# Patient Record
Sex: MALE | Race: BLACK OR AFRICAN AMERICAN | Employment: FULL TIME | URBAN - NONMETROPOLITAN AREA
[De-identification: names, ages, dates, MRNs, and addresses within clinical notes are randomized per-mention and may not be internally consistent; named-entity substitution may affect disease eponyms.]

---

## 2021-10-07 ENCOUNTER — HOSPITAL ENCOUNTER (OUTPATIENT)
Age: 41
Setting detail: OBSERVATION
Discharge: HOME | End: 2021-10-08
Attending: EMERGENCY MEDICINE | Admitting: HOSPITALIST
Payer: MEDICAID

## 2021-10-07 ENCOUNTER — APPOINTMENT (OUTPATIENT)
Dept: CT IMAGING | Age: 41
End: 2021-10-07
Attending: EMERGENCY MEDICINE
Payer: MEDICAID

## 2021-10-07 ENCOUNTER — APPOINTMENT (OUTPATIENT)
Dept: GENERAL RADIOLOGY | Age: 41
End: 2021-10-07
Attending: EMERGENCY MEDICINE
Payer: MEDICAID

## 2021-10-07 DIAGNOSIS — U07.1 PNEUMONIA DUE TO COVID-19 VIRUS: Primary | ICD-10-CM

## 2021-10-07 DIAGNOSIS — J12.82 PNEUMONIA DUE TO COVID-19 VIRUS: Primary | ICD-10-CM

## 2021-10-07 PROBLEM — R79.89 POSITIVE D DIMER: Status: ACTIVE | Noted: 2021-10-07

## 2021-10-07 PROBLEM — R09.02 HYPOXIA: Status: ACTIVE | Noted: 2021-10-07

## 2021-10-07 LAB
ALBUMIN SERPL-MCNC: 3.7 G/DL (ref 3.5–5)
ALBUMIN/GLOB SERPL: 0.8 {RATIO} (ref 1.1–2.2)
ALP SERPL-CCNC: 137 U/L (ref 45–117)
ALT SERPL-CCNC: 45 U/L (ref 12–78)
ANION GAP SERPL CALC-SCNC: 12 MMOL/L (ref 5–15)
ARTERIAL PATENCY WRIST A: NORMAL
AST SERPL W P-5'-P-CCNC: 59 U/L (ref 15–37)
BASE EXCESS BLDA CALC-SCNC: 0.7 MMOL/L (ref 0–2)
BASOPHILS # BLD: 0.1 K/UL (ref 0–0.2)
BASOPHILS NFR BLD: 2 % (ref 0–2.5)
BDY SITE: NORMAL
BILIRUB SERPL-MCNC: 0.3 MG/DL (ref 0.2–1)
BUN SERPL-MCNC: 6 MG/DL (ref 6–20)
BUN/CREAT SERPL: 7 (ref 12–20)
CA-I BLD-MCNC: 8.3 MG/DL (ref 8.5–10.1)
CHLORIDE SERPL-SCNC: 101 MMOL/L (ref 97–108)
CO2 SERPL-SCNC: 30 MMOL/L (ref 21–32)
COHGB MFR BLD: 0.8 % (ref 0–5)
COVID-19 RAPID TEST, COVR: DETECTED
CREAT SERPL-MCNC: 0.83 MG/DL (ref 0.7–1.3)
D DIMER PPP FEU-MCNC: 1 MG/L FEU (ref 0.19–0.5)
EOSINOPHIL # BLD: 0 K/UL (ref 0–0.7)
EOSINOPHIL NFR BLD: 0 % (ref 0.9–2.9)
ERYTHROCYTE [DISTWIDTH] IN BLOOD BY AUTOMATED COUNT: 18.5 % (ref 11.5–14.5)
FIO2 ON VENT: 24 %
GAS FLOW.O2 O2 DELIVERY SYS: 2 L/MIN
GLOBULIN SER CALC-MCNC: 4.4 G/DL (ref 2–4)
GLUCOSE SERPL-MCNC: 89 MG/DL (ref 65–100)
HCO3 BLDA-SCNC: 25 MMOL/L (ref 22–26)
HCT VFR BLD AUTO: 49.3 % (ref 41–53)
HGB BLD OXIMETRY-MCNC: 17 G/DL (ref 13.5–17.5)
HGB BLD-MCNC: 16.9 G/DL (ref 13.5–17.5)
LACTATE SERPL-SCNC: 1.9 MMOL/L (ref 0.4–2)
LACTATE SERPL-SCNC: 2.5 MMOL/L (ref 0.4–2)
LDH SERPL L TO P-CCNC: 346 U/L (ref 85–241)
LYMPHOCYTES # BLD: 1 K/UL (ref 1–4.8)
LYMPHOCYTES NFR BLD: 21 % (ref 20.5–51.1)
MAGNESIUM SERPL-MCNC: 1.7 MG/DL (ref 1.6–2.4)
MCH RBC QN AUTO: 32.7 PG (ref 31–34)
MCHC RBC AUTO-ENTMCNC: 34.2 G/DL (ref 31–36)
MCV RBC AUTO: 95.6 FL (ref 80–100)
METHGB MFR BLD: 0.1 % (ref 0–5)
MONOCYTES # BLD: 0.5 K/UL (ref 0.2–2.4)
MONOCYTES NFR BLD: 11 % (ref 1.7–9.3)
NEUTS SEG # BLD: 3.4 K/UL (ref 1.8–7.7)
NEUTS SEG NFR BLD: 66 % (ref 42–75)
NRBC # BLD: 0.01 K/UL
NRBC BLD-RTO: 0.2 PER 100 WBC
OXYHGB MFR BLD: 96.3 % (ref 95–100)
PCO2 BLDA: 41 MMHG (ref 35–45)
PH BLDA: 7.41 [PH] (ref 7.35–7.45)
PLATELET # BLD AUTO: 178 K/UL (ref 150–400)
PMV BLD AUTO: 7 FL (ref 6.5–11.5)
PO2 BLDA: 92 MMHG (ref 70–100)
POTASSIUM SERPL-SCNC: 3.5 MMOL/L (ref 3.5–5.1)
PROCALCITONIN SERPL-MCNC: 0.06 NG/ML
PROT SERPL-MCNC: 8.1 G/DL (ref 6.4–8.2)
RBC # BLD AUTO: 5.15 M/UL (ref 4.5–5.9)
SAO2% DEVICE SAO2% SENSOR NAME: NORMAL
SODIUM SERPL-SCNC: 143 MMOL/L (ref 136–145)
SPECIMEN SITE: NORMAL
SPECIMEN SOURCE: ABNORMAL
TROPONIN-HIGH SENSITIVITY: 7 NG/L (ref 0–76)
WBC # BLD AUTO: 5.1 K/UL (ref 4.4–11.3)

## 2021-10-07 PROCEDURE — 96376 TX/PRO/DX INJ SAME DRUG ADON: CPT

## 2021-10-07 PROCEDURE — 84484 ASSAY OF TROPONIN QUANT: CPT

## 2021-10-07 PROCEDURE — 85025 COMPLETE CBC W/AUTO DIFF WBC: CPT

## 2021-10-07 PROCEDURE — 83605 ASSAY OF LACTIC ACID: CPT

## 2021-10-07 PROCEDURE — 85379 FIBRIN DEGRADATION QUANT: CPT

## 2021-10-07 PROCEDURE — 99218 HC RM OBSERVATION: CPT

## 2021-10-07 PROCEDURE — 77010033678 HC OXYGEN DAILY

## 2021-10-07 PROCEDURE — 86140 C-REACTIVE PROTEIN: CPT

## 2021-10-07 PROCEDURE — 99285 EMERGENCY DEPT VISIT HI MDM: CPT

## 2021-10-07 PROCEDURE — 65270000029 HC RM PRIVATE

## 2021-10-07 PROCEDURE — 82803 BLOOD GASES ANY COMBINATION: CPT

## 2021-10-07 PROCEDURE — 83615 LACTATE (LD) (LDH) ENZYME: CPT

## 2021-10-07 PROCEDURE — 80053 COMPREHEN METABOLIC PANEL: CPT

## 2021-10-07 PROCEDURE — 36415 COLL VENOUS BLD VENIPUNCTURE: CPT

## 2021-10-07 PROCEDURE — 82728 ASSAY OF FERRITIN: CPT

## 2021-10-07 PROCEDURE — 94760 N-INVAS EAR/PLS OXIMETRY 1: CPT

## 2021-10-07 PROCEDURE — 74011250637 HC RX REV CODE- 250/637: Performed by: HOSPITALIST

## 2021-10-07 PROCEDURE — 36600 WITHDRAWAL OF ARTERIAL BLOOD: CPT

## 2021-10-07 PROCEDURE — 71045 X-RAY EXAM CHEST 1 VIEW: CPT

## 2021-10-07 PROCEDURE — 96375 TX/PRO/DX INJ NEW DRUG ADDON: CPT

## 2021-10-07 PROCEDURE — 83735 ASSAY OF MAGNESIUM: CPT

## 2021-10-07 PROCEDURE — 96374 THER/PROPH/DIAG INJ IV PUSH: CPT

## 2021-10-07 PROCEDURE — 87635 SARS-COV-2 COVID-19 AMP PRB: CPT

## 2021-10-07 PROCEDURE — 74011000636 HC RX REV CODE- 636: Performed by: EMERGENCY MEDICINE

## 2021-10-07 PROCEDURE — 84145 PROCALCITONIN (PCT): CPT

## 2021-10-07 PROCEDURE — 71275 CT ANGIOGRAPHY CHEST: CPT

## 2021-10-07 PROCEDURE — 94640 AIRWAY INHALATION TREATMENT: CPT

## 2021-10-07 PROCEDURE — 93005 ELECTROCARDIOGRAM TRACING: CPT

## 2021-10-07 PROCEDURE — 74011250636 HC RX REV CODE- 250/636: Performed by: HOSPITALIST

## 2021-10-07 RX ORDER — POLYETHYLENE GLYCOL 3350 17 G/17G
17 POWDER, FOR SOLUTION ORAL DAILY PRN
Status: DISCONTINUED | OUTPATIENT
Start: 2021-10-07 | End: 2021-10-08 | Stop reason: HOSPADM

## 2021-10-07 RX ORDER — ONDANSETRON 2 MG/ML
4 INJECTION INTRAMUSCULAR; INTRAVENOUS
Status: DISCONTINUED | OUTPATIENT
Start: 2021-10-07 | End: 2021-10-08 | Stop reason: HOSPADM

## 2021-10-07 RX ORDER — SODIUM CHLORIDE 0.9 % (FLUSH) 0.9 %
5-40 SYRINGE (ML) INJECTION EVERY 8 HOURS
Status: DISCONTINUED | OUTPATIENT
Start: 2021-10-07 | End: 2021-10-08 | Stop reason: HOSPADM

## 2021-10-07 RX ORDER — FAMOTIDINE 20 MG/1
20 TABLET, FILM COATED ORAL 2 TIMES DAILY
Status: DISCONTINUED | OUTPATIENT
Start: 2021-10-07 | End: 2021-10-08 | Stop reason: HOSPADM

## 2021-10-07 RX ORDER — CALCIUM CARB/MAGNESIUM CARB 311-232MG
10 TABLET ORAL
Status: DISCONTINUED | OUTPATIENT
Start: 2021-10-07 | End: 2021-10-08 | Stop reason: HOSPADM

## 2021-10-07 RX ORDER — MAGNESIUM SULFATE HEPTAHYDRATE 40 MG/ML
2 INJECTION, SOLUTION INTRAVENOUS ONCE
Status: COMPLETED | OUTPATIENT
Start: 2021-10-07 | End: 2021-10-07

## 2021-10-07 RX ORDER — ACETAMINOPHEN 500 MG
2000 TABLET ORAL DAILY
Status: DISCONTINUED | OUTPATIENT
Start: 2021-10-07 | End: 2021-10-08 | Stop reason: HOSPADM

## 2021-10-07 RX ORDER — SODIUM CHLORIDE 0.9 % (FLUSH) 0.9 %
5-40 SYRINGE (ML) INJECTION AS NEEDED
Status: DISCONTINUED | OUTPATIENT
Start: 2021-10-07 | End: 2021-10-08 | Stop reason: HOSPADM

## 2021-10-07 RX ORDER — AZITHROMYCIN 250 MG/1
500 TABLET, FILM COATED ORAL DAILY
Status: DISCONTINUED | OUTPATIENT
Start: 2021-10-07 | End: 2021-10-08 | Stop reason: HOSPADM

## 2021-10-07 RX ORDER — MONTELUKAST SODIUM 10 MG/1
10 TABLET ORAL
Status: DISCONTINUED | OUTPATIENT
Start: 2021-10-07 | End: 2021-10-08 | Stop reason: HOSPADM

## 2021-10-07 RX ORDER — ALBUTEROL SULFATE 90 UG/1
2 AEROSOL, METERED RESPIRATORY (INHALATION)
Status: DISCONTINUED | OUTPATIENT
Start: 2021-10-07 | End: 2021-10-08 | Stop reason: HOSPADM

## 2021-10-07 RX ORDER — IBUPROFEN 200 MG
1 TABLET ORAL DAILY
Status: DISCONTINUED | OUTPATIENT
Start: 2021-10-07 | End: 2021-10-08 | Stop reason: HOSPADM

## 2021-10-07 RX ORDER — ENOXAPARIN SODIUM 100 MG/ML
40 INJECTION SUBCUTANEOUS DAILY
Status: DISCONTINUED | OUTPATIENT
Start: 2021-10-08 | End: 2021-10-08 | Stop reason: HOSPADM

## 2021-10-07 RX ORDER — ACETAMINOPHEN 325 MG/1
650 TABLET ORAL
Status: DISCONTINUED | OUTPATIENT
Start: 2021-10-07 | End: 2021-10-08 | Stop reason: HOSPADM

## 2021-10-07 RX ORDER — DEXAMETHASONE SODIUM PHOSPHATE 4 MG/ML
6 INJECTION, SOLUTION INTRA-ARTICULAR; INTRALESIONAL; INTRAMUSCULAR; INTRAVENOUS; SOFT TISSUE EVERY 12 HOURS
Status: DISCONTINUED | OUTPATIENT
Start: 2021-10-07 | End: 2021-10-07

## 2021-10-07 RX ORDER — DEXAMETHASONE SODIUM PHOSPHATE 100 MG/10ML
6 INJECTION INTRAMUSCULAR; INTRAVENOUS EVERY 12 HOURS
Status: DISCONTINUED | OUTPATIENT
Start: 2021-10-07 | End: 2021-10-08 | Stop reason: HOSPADM

## 2021-10-07 RX ORDER — CETIRIZINE HCL 10 MG
10 TABLET ORAL DAILY
Status: DISCONTINUED | OUTPATIENT
Start: 2021-10-07 | End: 2021-10-08 | Stop reason: HOSPADM

## 2021-10-07 RX ORDER — ACETAMINOPHEN 650 MG/1
650 SUPPOSITORY RECTAL
Status: DISCONTINUED | OUTPATIENT
Start: 2021-10-07 | End: 2021-10-08 | Stop reason: HOSPADM

## 2021-10-07 RX ORDER — ASCORBIC ACID 500 MG
500 TABLET ORAL 2 TIMES DAILY
Status: DISCONTINUED | OUTPATIENT
Start: 2021-10-07 | End: 2021-10-08 | Stop reason: HOSPADM

## 2021-10-07 RX ORDER — ZINC SULFATE 50(220)MG
1 CAPSULE ORAL DAILY
Status: DISCONTINUED | OUTPATIENT
Start: 2021-10-07 | End: 2021-10-08 | Stop reason: HOSPADM

## 2021-10-07 RX ORDER — MELATONIN
2000 DAILY
Status: DISCONTINUED | OUTPATIENT
Start: 2021-10-07 | End: 2021-10-07

## 2021-10-07 RX ADMIN — AZITHROMYCIN 500 MG: 250 TABLET, FILM COATED ORAL at 14:33

## 2021-10-07 RX ADMIN — FAMOTIDINE 20 MG: 20 TABLET, FILM COATED ORAL at 22:04

## 2021-10-07 RX ADMIN — MAGNESIUM SULFATE HEPTAHYDRATE 2 G: 40 INJECTION, SOLUTION INTRAVENOUS at 17:15

## 2021-10-07 RX ADMIN — Medication 2000 UNITS: at 14:33

## 2021-10-07 RX ADMIN — CETIRIZINE HYDROCHLORIDE 10 MG: 10 TABLET, FILM COATED ORAL at 14:33

## 2021-10-07 RX ADMIN — BARICITINIB 4 MG: 2 TABLET, FILM COATED ORAL at 14:33

## 2021-10-07 RX ADMIN — ZINC SULFATE 220 MG (50 MG) CAPSULE 1 CAPSULE: CAPSULE at 14:33

## 2021-10-07 RX ADMIN — DEXAMETHASONE SODIUM PHOSPHATE 6 MG: 10 INJECTION INTRAMUSCULAR; INTRAVENOUS at 22:03

## 2021-10-07 RX ADMIN — MOMETASONE FUROATE AND FORMOTEROL FUMARATE DIHYDRATE 2 PUFF: 200; 5 AEROSOL RESPIRATORY (INHALATION) at 21:02

## 2021-10-07 RX ADMIN — Medication 10 ML: at 22:04

## 2021-10-07 RX ADMIN — Medication 10 MG: at 22:04

## 2021-10-07 RX ADMIN — MONTELUKAST 10 MG: 10 TABLET, FILM COATED ORAL at 22:04

## 2021-10-07 RX ADMIN — OXYCODONE HYDROCHLORIDE AND ACETAMINOPHEN 500 MG: 500 TABLET ORAL at 14:33

## 2021-10-07 RX ADMIN — DEXAMETHASONE SODIUM PHOSPHATE 6 MG: 10 INJECTION INTRAMUSCULAR; INTRAVENOUS at 14:33

## 2021-10-07 RX ADMIN — Medication 10 ML: at 14:53

## 2021-10-07 RX ADMIN — OXYCODONE HYDROCHLORIDE AND ACETAMINOPHEN 500 MG: 500 TABLET ORAL at 22:03

## 2021-10-07 RX ADMIN — IOPAMIDOL 100 ML: 755 INJECTION, SOLUTION INTRAVENOUS at 08:25

## 2021-10-07 NOTE — ED PROVIDER NOTES
EMERGENCY DEPARTMENT HISTORY AND PHYSICAL EXAM  ?    Date: 10/7/2021  Patient Name: Ashwin Oliva. History of Presenting Illness    Patient presents with:  Dizziness  Fatigue      History Provided By: Patient    HPI: Ashwin Oliva., 42 y.o. male with no significant past medical history presents to the ED with cc of dizziness that started yesterday. He went to work and was sent home. He says he got diaphoretic. He denies vertigo. In this morning he had another episode of dizziness, feeling near syncopal.  He denies chest pain or shortness of breath. On room air, he has O2 saturation of 91%. He is a smoker and has occasional cough. He denies fever or congestion. No vomiting or diarrhea. Family history is negative for coronary artery disease. He denies drug use. There are no other complaints, changes, or physical findings at this time. PCP: None        Past History    Past Medical History:  History reviewed. No pertinent past medical history. Past Surgical History:  History reviewed. No pertinent surgical history. Family History:  History reviewed. No pertinent family history.       Social History:  Social History   Tobacco Use     Smoking status: Current Every Day Smoker       Packs/day: 0.25     Smokeless tobacco: Never Used   Vaping Use     Vaping Use: Never used   Alcohol use: Yes     Comment: occasionally    Drug use: Never      Allergies:  No Known Allergies      Review of Systems  @[facility]@    Physical Exam  [unfilled]    Diagnostic Study Results    Labs -  Recent Results (from the past 12 hour(s))  -EKG, 12 LEAD, INITIAL  Collection Time: 10/07/21  6:08 AM      Result                      Value             Ref Range          Ventricular Rate            84                BPM                Atrial Rate                 84                BPM                P-R Interval                143               ms                 QRS Duration                74                ms Q-T Interval                361               ms                 QTC Calculation (Bezet)     427               ms                 Calculated P Axis           83                degrees            Calculated R Axis           60                degrees            Calculated T Axis           69                degrees            Diagnosis                                                    Sinus rhythm Right atrial enlargement Consider left ventricular hypertrophy ST elev, probable normal early repol pattern Baseline wander in lead(s) V1     Radiologic Studies -   XR CHEST PORT    (Results Pending)  CT Results  (Last 48 hours)   None     CXR Results  (Last 48 hours)   None       Medical Decision Making and ED Course  I am the first provider for this patient. I reviewed the vital signs, available nursing notes, past medical history, past surgical history, family history and social history. Vital Signs-Reviewed the patient's vital signs. Empty flowsheet group. EKG interpretation: (Preliminary)  Rhythm: normal sinus rhythm; and regular . Rate (approx.): 84; Axis: normal; GA interval: normal; QRS interval: normal ; ST/T wave: non-specific changes; Other findings: Early repolarization. Records Reviewed: Nursing Notes    Provider Notes (Medical Decision Making):   Rule out ACS. The patient presents with differential diagnosis of Covid infection, COPD, asthma, ACS      ED Course:   Initial assessment performed. The patients presenting problems have been discussed, and they are in agreement with the care plan formulated and outlined with them. I have encouraged them to ask questions as they arise throughout their visit. Procedures      MD Giles Rosario MD        Disposition            DISCHARGE PLAN:  1. There are no discharge medications for this patient. 2. Follow-up Information    None    3.   Return to ED if worse     Diagnosis    Clinical Impression:       Giles Awad MD    Please note that this dictation was completed with Babble, the computer voice recognition software. Quite often unanticipated grammatical, syntax, homophones, and other interpretive errors are inadvertently transcribed by the computer software. Please disregard these errors. Please excuse any errors that have escaped final proofreading. Thank you. History reviewed. No pertinent past medical history. History reviewed. No pertinent surgical history. History reviewed. No pertinent family history. Social History     Socioeconomic History    Marital status:      Spouse name: Not on file    Number of children: Not on file    Years of education: Not on file    Highest education level: Not on file   Occupational History    Not on file   Tobacco Use    Smoking status: Current Every Day Smoker     Packs/day: 0.25    Smokeless tobacco: Never Used   Vaping Use    Vaping Use: Never used   Substance and Sexual Activity    Alcohol use: Yes     Comment: occasionally     Drug use: Never    Sexual activity: Yes     Partners: Female     Birth control/protection: Condom   Other Topics Concern    Not on file   Social History Narrative    Not on file     Social Determinants of Health     Financial Resource Strain:     Difficulty of Paying Living Expenses:    Food Insecurity:     Worried About Running Out of Food in the Last Year:     [address] in the Last Year:    Transportation Needs:     Lack of Transportation (Medical):      Lack of Transportation (Non-Medical):    Physical Activity:     Days of Exercise per Week:     Minutes of Exercise per Session:    Stress:     Feeling of Stress :    Social Connections:     Frequency of Communication with Friends and Family:     Frequency of Social Gatherings with Friends and Family:     Attends [employer] Services:     Active Member of Clubs or Organizations:     Attends Club or Organization Meetings:     Marital Status: Intimate Partner Violence:     Fear of Current or Ex-Partner:     Emotionally Abused:     Physically Abused:     Sexually Abused: ALLERGIES: Patient has no known allergies. Review of Systems   Constitutional: Negative. HENT: Negative. Eyes: Negative. Respiratory: Negative. Cardiovascular: Negative. Gastrointestinal: Negative. Endocrine: Negative. Genitourinary: Negative. Musculoskeletal: Negative. Skin: Negative. Allergic/Immunologic: Negative. Neurological: Positive for dizziness and light-headedness. Negative for seizures. Hematological: Negative. Psychiatric/Behavioral: Negative. Vitals:    10/07/21 0601   BP: (!) 140/100   Pulse: 84   Resp: 16   Temp: 98.3 °F (36.8 °C)   SpO2: 93%   Weight: 66.7 kg (147 lb)   Height: 5' 10\" (1.778 m)            Physical Exam  Vitals and nursing note reviewed. Constitutional:       Appearance: Normal appearance. He is normal weight. HENT:      Head: Normocephalic and atraumatic. Right Ear: Tympanic membrane, ear canal and external ear normal.      Left Ear: Tympanic membrane, ear canal and external ear normal.      Nose: Nose normal.      Mouth/Throat:      Mouth: Mucous membranes are moist.      Pharynx: Oropharynx is clear. Eyes:      Extraocular Movements: Extraocular movements intact. Conjunctiva/sclera: Conjunctivae normal.      Pupils: Pupils are equal, round, and reactive to light. Cardiovascular:      Rate and Rhythm: Normal rate and regular rhythm. Pulses: Normal pulses. Heart sounds: Normal heart sounds. Pulmonary:      Effort: Pulmonary effort is normal.      Breath sounds: Normal breath sounds. Abdominal:      General: Abdomen is flat. Bowel sounds are normal.      Palpations: Abdomen is soft. Musculoskeletal:         General: Normal range of motion. Cervical back: Normal range of motion and neck supple. Skin:     General: Skin is warm and dry.    Neurological: General: No focal deficit present. Mental Status: He is alert and oriented to person, place, and time.    Psychiatric:         Mood and Affect: Mood normal.         Behavior: Behavior normal.          MDM       Procedures

## 2021-10-07 NOTE — ED NOTES
TRANSFER - OUT REPORT:    Verbal report given to Acacia Cutler.  being transferred to [facility] for routine progression of care       Report consisted of patients Situation, Background, Assessment and   Recommendations(SBAR). Information from the following report(s) SBAR was reviewed with the receiving nurse. Opportunity for questions and clarification was provided.       Patient transported with:   Registered Nurse

## 2021-10-07 NOTE — ED TRIAGE NOTES
Patient states he has feelings of dizziness and weakness which began 2 days ago. States he got to work this morning and felt sweaty and \"like I was gonna pass out. \"

## 2021-10-07 NOTE — H&P
History and Physical      Chief Complaints:     Chief Complaint   Patient presents with    Dizziness    Fatigue         Subjective:     Tk Hanna. is a 42 y.o. male has no past medical history presents with complaint of dizziness. States that has been having symptoms of dizziness especially in the morning upon waking up and soon after getting cold sweats. Is been having increasing cough but denies any shortness of breath or fevers his wife and kids all doing well and for the past 2 days been having no work worsening of symptoms  On evaluation patient had slight elevated D-dimer of 1 and CT of the chest was done which found to be negative. Initial O2 sat was 91% on room air and ABG on 2 L showed a PO2 of 92. Initial Covid swab was positive CT of the chest showed no evidence of pulmonary embolism indeterminate faint groundglass opacities in the right lower lobe and mild central bronchial wall thickening patient presentation he was referred for admission for COVID-19 positive pneumonia along with hypoxia      History reviewed. No pertinent past medical history. History reviewed. No pertinent surgical history. History reviewed. No pertinent family history. Social History     Tobacco Use    Smoking status: Current Every Day Smoker     Packs/day: 0.25    Smokeless tobacco: Never Used   Substance Use Topics    Alcohol use: Yes     Comment: occasionally        Prior to Admission medications    Not on File     No Known Allergies     Review of Systems:  Review of Systems   Constitutional: Negative for chills, diaphoresis, fatigue and fever. HENT: Negative for congestion, ear pain, postnasal drip, sinus pain and sore throat. Eyes: Negative for pain, discharge and redness. Respiratory: Positive for cough. Negative for shortness of breath and wheezing. Cardiovascular: Negative for chest pain and palpitations.    Gastrointestinal: Negative for abdominal pain, constipation, diarrhea, nausea and vomiting. Genitourinary: Negative for dysuria, flank pain, frequency and urgency. Musculoskeletal: Negative for arthralgias and myalgias. Neurological: Positive for dizziness. Negative for weakness and headaches. Psychiatric/Behavioral: Negative for agitation and hallucinations. The patient is not nervous/anxious. Objective:     Vitals:  Visit Vitals  BP (!) 178/102   Pulse 83   Temp 98.6 °F (37 °C)   Resp 16   Ht 5' 10\" (1.778 m)   Wt 66.7 kg (147 lb)   SpO2 98%   BMI 21.09 kg/m²       Physical Exam:  General: Alert, cooperative, no distress. Head:  Normocephalic, without obvious abnormality, atraumatic. Eyes:  Conjunctivae/corneas clear. Pupils equal, round, reactive to light. Extraocular movements intact. Lungs:  Clear to auscultation bilaterally, no wheezes, crackles  Chest wall: No tenderness or deformity. Heart:  Regular rate and rhythm, S1, S2 normal, no murmur, click, rub, or gallop. Abdomen:   Soft, non-tender. Bowel sounds normal. No masses. No organomegaly. Back:  No spine tenderness to palpation  Extremities: Extremities normal, atraumatic, no cyanosis or edema. Pulses: Symmetric all extremities. Skin: Skin color, texture, turgor normal.   Lymph nodes: Cervical nodes normal.  Neurologic: CNII-XII intact. Normal strength, sensation, and reflexes throughout.       Labs:  Recent Results (from the past 24 hour(s))   EKG, 12 LEAD, INITIAL    Collection Time: 10/07/21  6:08 AM   Result Value Ref Range    Ventricular Rate 84 BPM    Atrial Rate 84 BPM    P-R Interval 143 ms    QRS Duration 74 ms    Q-T Interval 361 ms    QTC Calculation (Bezet) 427 ms    Calculated P Axis 83 degrees    Calculated R Axis 60 degrees    Calculated T Axis 69 degrees    Diagnosis       Sinus rhythm  Right atrial enlargement  Consider left ventricular hypertrophy  ST elev, probable normal early repol pattern  Baseline wander in lead(s) V1     CBC WITH AUTOMATED DIFF    Collection Time: 10/07/21  6:25 AM Result Value Ref Range    WBC 5.1 4.4 - 11.3 K/uL    RBC 5.15 4.50 - 5.90 M/uL    HGB 16.9 13.5 - 17.5 g/dL    HCT 49.3 41 - 53 %    MCV 95.6 80 - 100 FL    MCH 32.7 31 - 34 PG    MCHC 34.2 31.0 - 36.0 g/dL    RDW 18.5 (H) 11.5 - 14.5 %    PLATELET 754 203 - 148 K/uL    MPV 7.0 6.5 - 11.5 FL    NRBC 0.2  WBC    ABSOLUTE NRBC 0.01 K/uL    NEUTROPHILS 66 42 - 75 %    LYMPHOCYTES 21 20.5 - 51.1 %    MONOCYTES 11 (H) 1.7 - 9.3 %    EOSINOPHILS 0 (L) 0.9 - 2.9 %    BASOPHILS 2 0.0 - 2.5 %    ABS. NEUTROPHILS 3.4 1.8 - 7.7 K/UL    ABS. LYMPHOCYTES 1.0 1.0 - 4.8 K/UL    ABS. MONOCYTES 0.5 0.2 - 2.4 K/UL    ABS. EOSINOPHILS 0.0 0.0 - 0.7 K/UL    ABS. BASOPHILS 0.1 0.0 - 0.2 K/UL   METABOLIC PANEL, COMPREHENSIVE    Collection Time: 10/07/21  6:25 AM   Result Value Ref Range    Sodium 143 136 - 145 mmol/L    Potassium 3.5 3.5 - 5.1 mmol/L    Chloride 101 97 - 108 mmol/L    CO2 30 21 - 32 mmol/L    Anion gap 12 5 - 15 mmol/L    Glucose 89 65 - 100 mg/dL    BUN 6 6 - 20 mg/dL    Creatinine 0.83 0.70 - 1.30 mg/dL    BUN/Creatinine ratio 7 (L) 12 - 20      GFR est AA >60 >60 ml/min/1.73m2    GFR est non-AA >60 >60 ml/min/1.73m2    Calcium 8.3 (L) 8.5 - 10.1 mg/dL    Bilirubin, total 0.3 0.2 - 1.0 mg/dL    AST (SGOT) 59 (H) 15 - 37 U/L    ALT (SGPT) 45 12 - 78 U/L    Alk.  phosphatase 137 (H) 45 - 117 U/L    Protein, total 8.1 6.4 - 8.2 g/dL    Albumin 3.7 3.5 - 5.0 g/dL    Globulin 4.4 (H) 2.0 - 4.0 g/dL    A-G Ratio 0.8 (L) 1.1 - 2.2     TROPONIN-HIGH SENSITIVITY    Collection Time: 10/07/21  6:25 AM   Result Value Ref Range    Troponin-High Sensitivity 7 0 - 76 ng/L   D DIMER    Collection Time: 10/07/21  6:25 AM   Result Value Ref Range    D-dimer 1.00 (H) 0.19 - 0.50 mg/L FEU   LACTIC ACID    Collection Time: 10/07/21  6:25 AM   Result Value Ref Range    Lactic acid 2.5 (HH) 0.4 - 2.0 mmol/L   MAGNESIUM    Collection Time: 10/07/21  6:25 AM   Result Value Ref Range    Magnesium 1.7 1.6 - 2.4 mg/dL   LD    Collection Time: 10/07/21  6:25 AM   Result Value Ref Range     (H) 85 - 241 U/L   BLOOD GAS, ARTERIAL    Collection Time: 10/07/21  8:37 AM   Result Value Ref Range    pH 7.41 7.35 - 7.45      PCO2 41 35 - 45 mmHg    PO2 92 70 - 100 mmHg    BICARBONATE 25 22 - 26 mmol/L    BASE EXCESS 0.7 0 - 2 mmol/L    O2 METHOD Nasal Cannula      O2 FLOW RATE 2 L/min    FIO2 24.0 %    Sample source Arterial      SITE Right Radial      LUANN'S TEST PASS      Carboxy-Hgb 0.8 0 - 5 %    Methemoglobin 0.1 0 - 5 %    tHb 17.0 13.5 - 17.5 g/dL    Oxyhemoglobin 96.3 95 - 100 %   LACTIC ACID    Collection Time: 10/07/21  9:35 AM   Result Value Ref Range    Lactic acid 1.9 0.4 - 2.0 mmol/L   COVID-19 RAPID TEST    Collection Time: 10/07/21 11:00 AM   Result Value Ref Range    Specimen source Nasopharyngeal      COVID-19 rapid test DETECTED (A) Not Detected         Imaging:  CTA CHEST W OR W WO CONT    Result Date: 10/7/2021  1. No evidence of pulmonary embolism. 2.  Indeterminate faint groundglass opacities in the right lower lobe. Mild central bronchial wall thickening. 3.  See full report for detailed findings. XR CHEST PORT    Result Date: 10/7/2021  No acute findings. Assessment & Plan:     COVID-19 pneumonia  -Presents emergency room and found to have rapid Covid swab to be positive and CTA chest shows some evidence of groundglass appearance  -Droplet precautions  -Start dexamethasone 6 mg IV twice daily  -Cetirizine 10 mg daily and montelukast 10 mg at night  -Vitamin C, vitamin D, zinc   -As needed Tessalon Perles and scheduled promethazine with codeine every 8 hours  -Azithromycin 500 mg oral daily  -Melatonin nightly  -baricitinib 4mg oral daily 1st dose now   -With patient's smoking history we will also start Dulera 2 puffs twice daily  -As needed albuterol inhaler  -Follow CRP and procalcitonin levels as well as LDH and ferritin  -D-dimer initially 1 follow repeat in a.m.     Dizziness  -Most likely secondary to Covid pneumonia but does say he feels better after initial IV fluids given the emergency room  -BUN/creatinine is normal range  -Monitor on telemetry    Hypomagnesemia  - low at 1.7 and will give a 2 g IV magnesium sulfate rider  -Repeat mag level in a.m.     Hypoxia  -Initially placed on 2 L but O2 on ABG was at 92  -Taper O2 as tolerated to maintain saturation greater 92%    Tobacco use  -Smokes about a pack every 5 days since he was 11years old  -Does use an albuterol inhaler at times no official diagnosis of COPD but does have noted wheezing periodically  -CTA does show some evidence of bronchial wall thickening  -Nicotine patch change daily    GI prophylaxis  -Pepcid twice daily    DVT prophylaxis  -Lovenox    CODE STATUS: Full code  Patient's designated decision-maker is his wife Carrington Haines if for some reason he is unable to make decisions for himself    45 minutes evaluating according patient's admission to acute telemetry and expect at least 1 to 2 days of acute care stay      Electronically signed by Amado Ernandez MD on 10/7/2021 at 4:46 PM

## 2021-10-07 NOTE — ROUTINE PROCESS
Admitted to room 216. Droplet plus isolation education. oacc cough noted. No resp distress. Ate well. No pain.  On O2 per nc 2L/m

## 2021-10-08 VITALS
WEIGHT: 147 LBS | HEART RATE: 67 BPM | SYSTOLIC BLOOD PRESSURE: 145 MMHG | HEIGHT: 70 IN | DIASTOLIC BLOOD PRESSURE: 80 MMHG | TEMPERATURE: 98.5 F | RESPIRATION RATE: 20 BRPM | OXYGEN SATURATION: 97 % | BODY MASS INDEX: 21.05 KG/M2

## 2021-10-08 LAB
ALBUMIN SERPL-MCNC: 3.8 G/DL (ref 3.5–5)
ALBUMIN/GLOB SERPL: 0.9 {RATIO} (ref 1.1–2.2)
ALP SERPL-CCNC: 143 U/L (ref 45–117)
ALT SERPL-CCNC: 45 U/L (ref 12–78)
ANION GAP SERPL CALC-SCNC: 15 MMOL/L (ref 5–15)
AST SERPL W P-5'-P-CCNC: 45 U/L (ref 15–37)
ATRIAL RATE: 84 BPM
BASOPHILS # BLD: 0 K/UL (ref 0–0.2)
BASOPHILS NFR BLD: 0 % (ref 0–2.5)
BILIRUB SERPL-MCNC: 0.7 MG/DL (ref 0.2–1)
BUN SERPL-MCNC: 8 MG/DL (ref 6–20)
BUN/CREAT SERPL: 13 (ref 12–20)
CA-I BLD-MCNC: 9 MG/DL (ref 8.5–10.1)
CALCULATED P AXIS, ECG09: 83 DEGREES
CALCULATED R AXIS, ECG10: 60 DEGREES
CALCULATED T AXIS, ECG11: 69 DEGREES
CHLORIDE SERPL-SCNC: 98 MMOL/L (ref 97–108)
CO2 SERPL-SCNC: 26 MMOL/L (ref 21–32)
CREAT SERPL-MCNC: 0.64 MG/DL (ref 0.7–1.3)
CRP SERPL-MCNC: 0.42 MG/DL
D DIMER PPP FEU-MCNC: 1.42 MG/L FEU (ref 0.19–0.5)
DIAGNOSIS, 93000: NORMAL
EOSINOPHIL # BLD: 0 K/UL (ref 0–0.7)
EOSINOPHIL NFR BLD: 0 % (ref 0.9–2.9)
ERYTHROCYTE [DISTWIDTH] IN BLOOD BY AUTOMATED COUNT: 18.6 % (ref 11.5–14.5)
GLOBULIN SER CALC-MCNC: 4.1 G/DL (ref 2–4)
GLUCOSE BLD STRIP.AUTO-MCNC: 111 MG/DL (ref 65–117)
GLUCOSE BLD STRIP.AUTO-MCNC: 135 MG/DL (ref 65–117)
GLUCOSE SERPL-MCNC: 113 MG/DL (ref 65–100)
HCT VFR BLD AUTO: 49.5 % (ref 41–53)
HGB BLD-MCNC: 16.9 G/DL (ref 13.5–17.5)
LYMPHOCYTES # BLD: 0.7 K/UL (ref 1–4.8)
LYMPHOCYTES NFR BLD: 44 % (ref 20.5–51.1)
MAGNESIUM SERPL-MCNC: 2 MG/DL (ref 1.6–2.4)
MCH RBC QN AUTO: 32.9 PG (ref 31–34)
MCHC RBC AUTO-ENTMCNC: 34.1 G/DL (ref 31–36)
MCV RBC AUTO: 96.5 FL (ref 80–100)
MONOCYTES # BLD: 0.1 K/UL (ref 0.2–2.4)
MONOCYTES NFR BLD: 9 % (ref 1.7–9.3)
NEUTS SEG # BLD: 0.8 K/UL (ref 1.8–7.7)
NEUTS SEG NFR BLD: 47 % (ref 42–75)
NRBC # BLD: 0.01 K/UL
NRBC BLD-RTO: 0.5 PER 100 WBC
P-R INTERVAL, ECG05: 143 MS
PERFORMED BY, TECHID: ABNORMAL
PERFORMED BY, TECHID: NORMAL
PLATELET # BLD AUTO: 169 K/UL (ref 150–400)
PMV BLD AUTO: 7.8 FL (ref 6.5–11.5)
POTASSIUM SERPL-SCNC: 4.1 MMOL/L (ref 3.5–5.1)
PROT SERPL-MCNC: 7.9 G/DL (ref 6.4–8.2)
Q-T INTERVAL, ECG07: 361 MS
QRS DURATION, ECG06: 74 MS
QTC CALCULATION (BEZET), ECG08: 427 MS
RBC # BLD AUTO: 5.13 M/UL (ref 4.5–5.9)
SODIUM SERPL-SCNC: 139 MMOL/L (ref 136–145)
VENTRICULAR RATE, ECG03: 84 BPM
WBC # BLD AUTO: 1.7 K/UL (ref 4.4–11.3)

## 2021-10-08 PROCEDURE — 86140 C-REACTIVE PROTEIN: CPT

## 2021-10-08 PROCEDURE — 96376 TX/PRO/DX INJ SAME DRUG ADON: CPT

## 2021-10-08 PROCEDURE — 96372 THER/PROPH/DIAG INJ SC/IM: CPT

## 2021-10-08 PROCEDURE — 74011250636 HC RX REV CODE- 250/636: Performed by: HOSPITALIST

## 2021-10-08 PROCEDURE — 74011250637 HC RX REV CODE- 250/637: Performed by: HOSPITALIST

## 2021-10-08 PROCEDURE — 94760 N-INVAS EAR/PLS OXIMETRY 1: CPT

## 2021-10-08 PROCEDURE — 77010033678 HC OXYGEN DAILY

## 2021-10-08 PROCEDURE — 85025 COMPLETE CBC W/AUTO DIFF WBC: CPT

## 2021-10-08 PROCEDURE — 82962 GLUCOSE BLOOD TEST: CPT

## 2021-10-08 PROCEDURE — 80053 COMPREHEN METABOLIC PANEL: CPT

## 2021-10-08 PROCEDURE — 99218 HC RM OBSERVATION: CPT

## 2021-10-08 PROCEDURE — 87040 BLOOD CULTURE FOR BACTERIA: CPT

## 2021-10-08 PROCEDURE — 83735 ASSAY OF MAGNESIUM: CPT

## 2021-10-08 PROCEDURE — 85379 FIBRIN DEGRADATION QUANT: CPT

## 2021-10-08 PROCEDURE — 94640 AIRWAY INHALATION TREATMENT: CPT

## 2021-10-08 RX ORDER — ACETAMINOPHEN 500 MG
2000 TABLET ORAL DAILY
Qty: 30 CAPSULE | Refills: 0 | Status: SHIPPED | OUTPATIENT
Start: 2021-10-09 | End: 2022-03-31

## 2021-10-08 RX ORDER — CETIRIZINE HCL 10 MG
10 TABLET ORAL DAILY
Qty: 30 TABLET | Refills: 0 | Status: SHIPPED | OUTPATIENT
Start: 2021-10-09 | End: 2022-03-31

## 2021-10-08 RX ORDER — ASCORBIC ACID 500 MG
500 TABLET ORAL 2 TIMES DAILY
Qty: 60 TABLET | Refills: 0 | Status: SHIPPED | OUTPATIENT
Start: 2021-10-08 | End: 2022-03-31

## 2021-10-08 RX ORDER — AZITHROMYCIN 250 MG/1
500 TABLET, FILM COATED ORAL DAILY
Qty: 6 TABLET | Refills: 0 | Status: SHIPPED | OUTPATIENT
Start: 2021-10-08 | End: 2022-03-31

## 2021-10-08 RX ORDER — ALBUTEROL SULFATE 90 UG/1
2 AEROSOL, METERED RESPIRATORY (INHALATION)
Qty: 18 G | Refills: 0 | Status: SHIPPED | OUTPATIENT
Start: 2021-10-08 | End: 2022-03-31

## 2021-10-08 RX ORDER — IBUPROFEN 200 MG
1 TABLET ORAL DAILY
Qty: 30 PATCH | Refills: 0 | Status: SHIPPED | OUTPATIENT
Start: 2021-10-09 | End: 2021-11-08

## 2021-10-08 RX ORDER — ZINC SULFATE 50(220)MG
1 CAPSULE ORAL DAILY
Qty: 30 CAPSULE | Refills: 0 | Status: SHIPPED | OUTPATIENT
Start: 2021-10-09 | End: 2022-03-31

## 2021-10-08 RX ORDER — MONTELUKAST SODIUM 10 MG/1
10 TABLET ORAL
Qty: 30 TABLET | Refills: 0 | Status: SHIPPED | OUTPATIENT
Start: 2021-10-08 | End: 2022-03-31

## 2021-10-08 RX ORDER — DEXAMETHASONE 2 MG/1
TABLET ORAL
Qty: 15 TABLET | Refills: 0 | Status: SHIPPED | OUTPATIENT
Start: 2021-10-08 | End: 2021-10-18

## 2021-10-08 RX ADMIN — ZINC SULFATE 220 MG (50 MG) CAPSULE 1 CAPSULE: CAPSULE at 08:02

## 2021-10-08 RX ADMIN — OXYCODONE HYDROCHLORIDE AND ACETAMINOPHEN 500 MG: 500 TABLET ORAL at 08:02

## 2021-10-08 RX ADMIN — CETIRIZINE HYDROCHLORIDE 10 MG: 10 TABLET, FILM COATED ORAL at 08:01

## 2021-10-08 RX ADMIN — AZITHROMYCIN 500 MG: 250 TABLET, FILM COATED ORAL at 08:01

## 2021-10-08 RX ADMIN — ENOXAPARIN SODIUM 40 MG: 40 INJECTION SUBCUTANEOUS at 08:03

## 2021-10-08 RX ADMIN — DEXAMETHASONE SODIUM PHOSPHATE 6 MG: 10 INJECTION INTRAMUSCULAR; INTRAVENOUS at 09:00

## 2021-10-08 RX ADMIN — Medication 10 ML: at 06:24

## 2021-10-08 RX ADMIN — MOMETASONE FUROATE AND FORMOTEROL FUMARATE DIHYDRATE 2 PUFF: 200; 5 AEROSOL RESPIRATORY (INHALATION) at 08:11

## 2021-10-08 RX ADMIN — Medication 2000 UNITS: at 08:01

## 2021-10-08 RX ADMIN — FAMOTIDINE 20 MG: 20 TABLET, FILM COATED ORAL at 09:00

## 2021-10-08 RX ADMIN — BARICITINIB 4 MG: 2 TABLET, FILM COATED ORAL at 08:01

## 2021-10-08 NOTE — PROGRESS NOTES
Problem: Loneliness or Risk for Loneliness  Goal: Demonstrate positive use of time alone when socialization is not possible  10/8/2021 1246 by Idelia Query, LPN  Outcome: Resolved/Met  10/8/2021 0950 by Idelia Query, LPN  Outcome: Progressing Towards Goal     Problem: Fatigue  Goal: Verbalize increase energy and improved vitality  10/8/2021 1246 by Idelia Query, LPN  Outcome: Resolved/Met  10/8/2021 0950 by Emeritalia Query, LPN  Outcome: Progressing Towards Goal     Problem: Patient Education: Go to Patient Education Activity  Goal: Patient/Family Education  10/8/2021 1246 by Idelia Query, LPN  Outcome: Resolved/Met  10/8/2021 0950 by Moea Query, LPN  Outcome: Progressing Towards Goal     Problem: Falls - Risk of  Goal: *Absence of Falls  Description: Document Obey Cease Fall Risk and appropriate interventions in the flowsheet.   10/8/2021 1246 by Moea Jackie, LPN  Outcome: Resolved/Met  Note: Fall Risk Interventions:                             10/8/2021 0950 by Margy Mejia, LPN  Outcome: Progressing Towards Goal  Note: Fall Risk Interventions:                                Problem: Patient Education: Go to Patient Education Activity  Goal: Patient/Family Education  10/8/2021 1246 by Moea Query, LPN  Outcome: Resolved/Met  10/8/2021 0950 by Moea Jackie, LPN  Outcome: Progressing Towards Goal     Problem: Loneliness or Risk for Loneliness  Goal: Demonstrate positive use of time alone when socialization is not possible  10/8/2021 1246 by Idelia Query, LPN  Outcome: Resolved/Met  10/8/2021 0950 by Moea Jackie, LPN  Outcome: Progressing Towards Goal     Problem: Risk for Fluid Volume Deficit  Goal: Maintain normal heart rhythm  10/8/2021 1246 by Idelia Query, LPN  Outcome: Resolved/Met  10/8/2021 0950 by Emeritalia Query, LPN  Outcome: Progressing Towards Goal  Goal: Maintain absence of muscle cramping  10/8/2021 1246 by Idelia Query, LPN  Outcome: Resolved/Met  10/8/2021 9288 by Jason Jones LPN  Outcome: Progressing Towards Goal  Goal: Maintain normal serum potassium, sodium, calcium, phosphorus, and pH  10/8/2021 1246 by Jason Jones LPN  Outcome: Resolved/Met  10/8/2021 0950 by Jason Jones LPN  Outcome: Progressing Towards Goal     Problem: Nutrition Deficits  Goal: Optimize nutrtional status  10/8/2021 1246 by Jason Jones LPN  Outcome: Resolved/Met  10/8/2021 0950 by Jason Jones LPN  Outcome: Progressing Towards Goal     Problem: Risk for Fluid Volume Deficit  Goal: Maintain normal heart rhythm  10/8/2021 1246 by Jason Jones LPN  Outcome: Resolved/Met  10/8/2021 0950 by Jason Jones LPN  Outcome: Progressing Towards Goal  Goal: Maintain absence of muscle cramping  10/8/2021 1246 by Jason Jones LPN  Outcome: Resolved/Met  10/8/2021 0950 by Jason Jones LPN  Outcome: Progressing Towards Goal  Goal: Maintain normal serum potassium, sodium, calcium, phosphorus, and pH  10/8/2021 1246 by Jason Jones LPN  Outcome: Resolved/Met  10/8/2021 0950 by Jason Jones LPN  Outcome: Progressing Towards Goal     Problem: Nutrition Deficits  Goal: Optimize nutrtional status  10/8/2021 1246 by Jason Jones LPN  Outcome: Resolved/Met  10/8/2021 0950 by Jason Jones LPN  Outcome: Progressing Towards Goal     Problem: Isolation Precautions - Risk of Spread of Infection  Goal: Prevent transmission of infectious organism to others  10/8/2021 1249 by Jason Jones LPN  Outcome: Resolved/Met  10/8/2021 1249 by Jason Jones LPN  Outcome: Progressing Towards Goal  10/8/2021 1246 by Jason Jones LPN  Outcome: Progressing Towards Goal  10/8/2021 0950 by Jason Jones LPN  Outcome: Progressing Towards Goal     Problem:  Body Temperature -  Risk of, Imbalanced  Goal: Ability to maintain a body temperature within defined limits  10/8/2021 1246 by Jason Jones LPN  Outcome: Resolved/Met  10/8/2021 0950 by Jason Jones LPN  Outcome: Progressing Towards Goal  Goal: Will regain or maintain usual level of consciousness  10/8/2021 1246 by Huy Barrientos LPN  Outcome: Resolved/Met  10/8/2021 0950 by Huy Barrientos LPN  Outcome: Progressing Towards Goal  Goal: Complications related to the disease process, condition or treatment will be avoided or minimized  10/8/2021 1246 by Huy Barrientos LPN  Outcome: Resolved/Met  10/8/2021 0950 by Huy Barrientos LPN  Outcome: Progressing Towards Goal     Problem: Breathing Pattern - Ineffective  Goal: Ability to achieve and maintain a regular respiratory rate  10/8/2021 1246 by Huy Barrientos LPN  Outcome: Resolved/Met  10/8/2021 0950 by Huy Barrientos LPN  Outcome: Progressing Towards Goal     Problem: Breathing Pattern - Ineffective  Goal: Ability to achieve and maintain a regular respiratory rate  10/8/2021 1246 by Huy Barrientos LPN  Outcome: Resolved/Met  10/8/2021 0950 by Huy Barrientos LPN  Outcome: Progressing Towards Goal     Problem: Gas Exchange - Impaired  Goal: Absence of hypoxia  10/8/2021 1246 by Huy Barrientos LPN  Outcome: Resolved/Met  10/8/2021 0950 by Huy Barrientos LPN  Outcome: Progressing Towards Goal  Goal: Promote optimal lung function  10/8/2021 1249 by Huy Barrientos LPN  Outcome: Resolved/Met  10/8/2021 1246 by Huy Barrientos LPN  Outcome: Progressing Towards Goal  10/8/2021 0950 by Huy Barrientos LPN  Outcome: Progressing Towards Goal     Problem: Airway Clearance - Ineffective  Goal: Achieve or maintain patent airway  10/8/2021 1246 by Huy Barrientos LPN  Outcome: Resolved/Met  10/8/2021 0950 by Huy Barrientos LPN  Outcome: Progressing Towards Goal

## 2021-10-08 NOTE — ACP (ADVANCE CARE PLANNING)
Advance Care Planning   Healthcare Decision Maker:       Primary Decision Maker: Shara Murcia - Spouse - [phone]    Click here to complete [address] including selection of the Healthcare Decision Maker Relationship (ie \"Primary\")

## 2021-10-08 NOTE — PROGRESS NOTES
Problem: Airway Clearance - Ineffective  Goal: Achieve or maintain patent airway  Outcome: Progressing Towards Goal     Problem: Gas Exchange - Impaired  Goal: Absence of hypoxia  Outcome: Progressing Towards Goal  Goal: Promote optimal lung function  Outcome: Progressing Towards Goal     Problem: Breathing Pattern - Ineffective  Goal: Ability to achieve and maintain a regular respiratory rate  Outcome: Progressing Towards Goal     Problem: Body Temperature -  Risk of, Imbalanced  Goal: Ability to maintain a body temperature within defined limits  Outcome: Progressing Towards Goal  Goal: Will regain or maintain usual level of consciousness  Outcome: Progressing Towards Goal  Goal: Complications related to the disease process, condition or treatment will be avoided or minimized  Outcome: Progressing Towards Goal     Problem: Isolation Precautions - Risk of Spread of Infection  Goal: Prevent transmission of infectious organism to others  Outcome: Progressing Towards Goal     Problem: Nutrition Deficits  Goal: Optimize nutrtional status  Outcome: Progressing Towards Goal     Problem: Risk for Fluid Volume Deficit  Goal: Maintain normal heart rhythm  Outcome: Progressing Towards Goal  Goal: Maintain absence of muscle cramping  Outcome: Progressing Towards Goal  Goal: Maintain normal serum potassium, sodium, calcium, phosphorus, and pH  Outcome: Progressing Towards Goal     Problem: Loneliness or Risk for Loneliness  Goal: Demonstrate positive use of time alone when socialization is not possible  Outcome: Progressing Towards Goal     Problem: Fatigue  Goal: Verbalize increase energy and improved vitality  Outcome: Progressing Towards Goal     Problem: Patient Education: Go to Patient Education Activity  Goal: Patient/Family Education  Outcome: Progressing Towards Goal     Problem: Falls - Risk of  Goal: *Absence of Falls  Description: Document Neha Fall Risk and appropriate interventions in the flowsheet.   Outcome: Progressing Towards Goal  Note: Fall Risk Interventions:                                Problem: Falls - Risk of  Goal: *Absence of Falls  Description: Document Neha Fall Risk and appropriate interventions in the flowsheet.   Outcome: Progressing Towards Goal  Note: Fall Risk Interventions:                                Problem: Patient Education: Go to Patient Education Activity  Goal: Patient/Family Education  Outcome: Progressing Towards Goal

## 2021-10-08 NOTE — PROGRESS NOTES
Reason for Admission: Covid                      RUR Score:  9- low risk                   Plan for utilizing home health:   No       PCP: First and Last name:  None     Name of Practice:    Are you a current patient: Yes/No:    Approximate date of last visit:    Can you participate in a virtual visit with your PCP:                     Current Advanced Directive/Advance Care Plan: Full Code      Healthcare Decision Maker:   Click here to complete [employer] including selection of the [employer] Relationship (ie \"Primary\")             Primary Decision Maker: Evelyn Garcia - [facility] [place] - [phone]                  Transition of Care Plan:  Patient will be discharged home to follow up with a PCP.  Referral made to Dr. Josh Chang, per patient request.

## 2021-10-08 NOTE — PROGRESS NOTES
Problem: Airway Clearance - Ineffective  Goal: Achieve or maintain patent airway  10/8/2021 1246 by Michael Fabian LPN  Outcome: Resolved/Met  10/8/2021 0950 by Michael Fabian LPN  Outcome: Progressing Towards Goal     Problem: Gas Exchange - Impaired  Goal: Absence of hypoxia  10/8/2021 1246 by Michael Fabian LPN  Outcome: Resolved/Met  10/8/2021 0950 by Michael Fabian LPN  Outcome: Progressing Towards Goal  Goal: Promote optimal lung function  10/8/2021 1246 by Michael Fabian LPN  Outcome: Progressing Towards Goal  10/8/2021 0950 by Michael Fabian LPN  Outcome: Progressing Towards Goal     Problem: Breathing Pattern - Ineffective  Goal: Ability to achieve and maintain a regular respiratory rate  10/8/2021 1246 by Michael Fabian LPN  Outcome: Resolved/Met  10/8/2021 0950 by Michael Fabian LPN  Outcome: Progressing Towards Goal     Problem: Body Temperature -  Risk of, Imbalanced  Goal: Ability to maintain a body temperature within defined limits  10/8/2021 1246 by Michael Fabian LPN  Outcome: Resolved/Met  10/8/2021 0950 by Michael Fabian LPN  Outcome: Progressing Towards Goal  Goal: Will regain or maintain usual level of consciousness  10/8/2021 1246 by Michael Fabian LPN  Outcome: Resolved/Met  10/8/2021 0950 by Michael Fabian LPN  Outcome: Progressing Towards Goal  Goal: Complications related to the disease process, condition or treatment will be avoided or minimized  10/8/2021 1246 by Michael Fabian LPN  Outcome: Resolved/Met  10/8/2021 0950 by Michael Fabian LPN  Outcome: Progressing Towards Goal     Problem: Isolation Precautions - Risk of Spread of Infection  Goal: Prevent transmission of infectious organism to others  10/8/2021 1246 by Michael Fabian LPN  Outcome: Progressing Towards Goal  10/8/2021 0950 by Michael Fabian LPN  Outcome: Progressing Towards Goal     Problem:  Body Temperature -  Risk of, Imbalanced  Goal: Ability to maintain a body temperature within defined limits  10/8/2021 1246 by Michael Fabian LPN  Outcome: Resolved/Met  10/8/2021 0950 by Michael Fabian LPN  Outcome: Progressing Towards Goal  Goal: Will regain or maintain usual level of consciousness  10/8/2021 1246 by Michael Fabian LPN  Outcome: Resolved/Met  10/8/2021 0950 by Michael Fabian LPN  Outcome: Progressing Towards Goal  Goal: Complications related to the disease process, condition or treatment will be avoided or minimized  10/8/2021 1246 by Michael Fabian LPN  Outcome: Resolved/Met  10/8/2021 0950 by Michael Fabian LPN  Outcome: Progressing Towards Goal     Problem: Nutrition Deficits  Goal: Optimize nutrtional status  10/8/2021 1246 by Michael Fabian LPN  Outcome: Resolved/Met  10/8/2021 0950 by Michael Fabian LPN  Outcome: Progressing Towards Goal     Problem: Risk for Fluid Volume Deficit  Goal: Maintain normal heart rhythm  10/8/2021 1246 by Michael Fabian LPN  Outcome: Resolved/Met  10/8/2021 0950 by Michael Fabian LPN  Outcome: Progressing Towards Goal  Goal: Maintain absence of muscle cramping  10/8/2021 1246 by Michael Fabian LPN  Outcome: Resolved/Met  10/8/2021 0950 by Michael Fabian LPN  Outcome: Progressing Towards Goal  Goal: Maintain normal serum potassium, sodium, calcium, phosphorus, and pH  10/8/2021 1246 by Michael Fabian LPN  Outcome: Resolved/Met  10/8/2021 0950 by Michael Fabian LPN  Outcome: Progressing Towards Goal     Problem: Loneliness or Risk for Loneliness  Goal: Demonstrate positive use of time alone when socialization is not possible  10/8/2021 1246 by Michael Fabian LPN  Outcome: Resolved/Met  10/8/2021 0950 by Michael Fabian LPN  Outcome: Progressing Towards Goal     Problem: Loneliness or Risk for Loneliness  Goal: Demonstrate positive use of time alone when socialization is not possible  10/8/2021 1246 by Michael Fabian LPN  Outcome: Resolved/Met  10/8/2021 0950 by Michael Fabian LPN  Outcome: Progressing Towards Goal     Problem: Fatigue  Goal: Verbalize increase energy and improved vitality  10/8/2021 1246 by Shayy Collins LPN  Outcome: Resolved/Met  10/8/2021 0950 by Shayy Collins LPN  Outcome: Progressing Towards Goal     Problem: Patient Education: Go to Patient Education Activity  Goal: Patient/Family Education  10/8/2021 1246 by Shayy Collins LPN  Outcome: Resolved/Met  10/8/2021 0950 by Shayy Collins LPN  Outcome: Progressing Towards Goal     Problem: Falls - Risk of  Goal: *Absence of Falls  Description: Document Gurpreet Martin Fall Risk and appropriate interventions in the flowsheet.   10/8/2021 1246 by Shayy Collins LPN  Outcome: Resolved/Met  Note: Fall Risk Interventions:                             10/8/2021 0950 by Shayy Collins LPN  Outcome: Progressing Towards Goal  Note: Fall Risk Interventions:                                Problem: Patient Education: Go to Patient Education Activity  Goal: Patient/Family Education  10/8/2021 1246 by Shayy Collins LPN  Outcome: Resolved/Met  10/8/2021 0950 by Shayy Collins LPN  Outcome: Progressing Towards Goal

## 2021-10-08 NOTE — DISCHARGE SUMMARY
Physician Discharge Summary       Patient: Margy Richard MRN: [id number]     YOB: 1980  Age: 42 y.o. Sex: male    PCP: None    Allergies: Patient has no known allergies. Admit date: 10/7/2021  Admitting Provider: Lisa Wilson MD    Discharge date: 10/8/2021  Discharging Provider: Lisa Wilson MD    * Admission Diagnoses:   Pneumonia due to COVID-19 virus [U07.1, J12.82]  Hypoxia [R09.02]  Positive D dimer [R79.89]      * Discharge Diagnoses:    Hospital Problems as of 10/8/2021 Never Reviewed        Codes Class Noted - Resolved POA    Hypoxia ICD-10-CM: R09.02  ICD-9-CM: 799.02  10/7/2021 - Present Unknown        Positive D dimer ICD-10-CM: R79.89  ICD-9-CM: 790.92  10/7/2021 - Present Unknown        * (Principal) Pneumonia due to COVID-19 virus ICD-10-CM: U07.1, J12.82  ICD-9-CM: 480.8, 079.89  10/7/2021 - Present Unknown                * Hospital Course: As per admitting provider:  Margy Richard is a 42 y.o. male has no past medical history presents with complaint of dizziness. States that has been having symptoms of dizziness especially in the morning upon waking up and soon after getting cold sweats. Is been having increasing cough but denies any shortness of breath or fevers his wife and kids all doing well and for the past 2 days been having no work worsening of symptoms  On evaluation patient had slight elevated D-dimer of 1 and CT of the chest was done which found to be negative. Initial O2 sat was 91% on room air and ABG on 2 L showed a PO2 of 92.   Initial Covid swab was positive CT of the chest showed no evidence of pulmonary embolism indeterminate faint groundglass opacities in the right lower lobe and mild central bronchial wall thickening patient presentation he was referred for admission for COVID-19 positive pneumonia along with hypoxia  Patient on discharge will be placed on dexamethasone 2 mg tablets to take 4 mg daily for 5 days and then 2 mg daily for 5 days patient also will be on vitamin C, vitamin D, zinc as well cetirizine and montelukast therapy     COVID-19 pneumonia  -Presents emergency room and found to have rapid Covid swab to be positive and CTA chest shows some evidence of groundglass appearance 2 days of noted symptoms patient was monitored closely started on dexamethasone 6 mg IV twice daily as well as Cetirizine 10 mg daily and montelukast 10 mg at night  Vitamin C, vitamin D, zinc  and As needed Tessalon Perles and scheduled promethazine with codeine every 8 hours  -Azithromycin 500 mg oral daily and will continue for additional 6 days, Melatonin nightly  -baricitinib 4mg oral daily first dose given on 10/7 and additionally on 10/8  -With patient's smoking history we will also start Dulera 2 puffs twice daily patient states that he felt better after inhaler so we will continue on discharge will continue as needed albuterol inhaler    Dizziness  -Most likely secondary to Covid pneumonia but does say he feels better after initial IV fluids given the emergency room  -BUN/creatinine is normal range  -Monitor on telemetry no irregular rhythms were noted     Hypomagnesemia  - low at 1.7 and will give a 2 g IV magnesium sulfate rider  And repeat magnesium level improved to 2     Hypoxia  -Initially placed on 2 L but O2 on ABG was at 92  -Patient on reevaluation this morning on room air with good O2 saturation of 97 to 98% and no apparent shortness of breath     Tobacco use  -Smokes about a pack every 5 days since he was 11years old  -Does use an albuterol inhaler at times no official diagnosis of COPD but does have noted wheezing periodically  -CTA does show some evidence of bronchial wall thickening  -Nicotine patch change daily  -Urged patient was       * Procedures:   * No surgery found *        Consults:   NONE    Vitals Last 24 Hours:  Patient Vitals for the past 24 hrs:   Temp Pulse Resp BP SpO2   10/08/21 1032     97 %   10/08/21 0812     98 %   10/08/21 0800  62      10/08/21 0732 98.5 °F (36.9 °C) 63 18 (!) 148/97 99 %   10/08/21 0400  (!) 57      10/08/21 0346 97.8 °F (36.6 °C) 60 18 (!) 150/98 98 %   10/08/21 0013 98.4 °F (36.9 °C) 64 18 (!) 158/105 98 %   10/08/21 0000  64      10/07/21 2102     97 %   10/07/21 2000  67      10/07/21 1958 98.1 °F (36.7 °C) 71 18 (!) 171/104 97 %   10/07/21 1602 98.6 °F (37 °C) 83 16 (!) 178/102 98 %   10/07/21 1600  88      10/07/21 1415 98 °F (36.7 °C) 88 16 (!) 155/89 98 %        Discharge Exam:  General: Alert, cooperative, no distress, appears stated age. Head:  Normocephalic, without obvious abnormality, atraumatic. Eyes:  Conjunctivae/corneas clear. Pupils equal, round, reactive to light. Extraocular movements intact. Lungs:  Clear to auscultation bilaterally. no wheeze, rales, crackles, rhonchi   Chest wall: No tenderness or deformity. Heart:  Regular rate and rhythm, S1, S2 normal, no murmur, click, rub or gallop. Abdomen:  Soft, non-tender. Bowel sounds normal. No masses,  No organomegaly. Extremities: Extremities normal, atraumatic, no cyanosis or edema. Pulses: 2+ and symmetric all extremities. Skin: Skin color, texture, turgor normal. No rashes or lesions  Neurologic: Awake, Alert, oriented.  No obvious gross sensory or motor deficits    Labs:  Recent Results (from the past 24 hour(s))   METABOLIC PANEL, COMPREHENSIVE    Collection Time: 10/08/21  6:05 AM   Result Value Ref Range    Sodium 139 136 - 145 mmol/L    Potassium 4.1 3.5 - 5.1 mmol/L    Chloride 98 97 - 108 mmol/L    CO2 26 21 - 32 mmol/L    Anion gap 15 5 - 15 mmol/L    Glucose 113 (H) 65 - 100 mg/dL    BUN 8 6 - 20 mg/dL    Creatinine 0.64 (L) 0.70 - 1.30 mg/dL    BUN/Creatinine ratio 13 12 - 20      GFR est AA >60 >60 ml/min/1.73m2    GFR est non-AA >60 >60 ml/min/1.73m2    Calcium 9.0 8.5 - 10.1 mg/dL    Bilirubin, total 0.7 0.2 - 1.0 mg/dL    AST (SGOT) 45 (H) 15 - 37 U/L    ALT (SGPT) 45 12 - 78 U/L Alk. phosphatase 143 (H) 45 - 117 U/L    Protein, total 7.9 6.4 - 8.2 g/dL    Albumin 3.8 3.5 - 5.0 g/dL    Globulin 4.1 (H) 2.0 - 4.0 g/dL    A-G Ratio 0.9 (L) 1.1 - 2.2     MAGNESIUM    Collection Time: 10/08/21  6:05 AM   Result Value Ref Range    Magnesium 2.0 1.6 - 2.4 mg/dL   CBC WITH AUTOMATED DIFF    Collection Time: 10/08/21  6:05 AM   Result Value Ref Range    WBC 1.7 (L) 4.4 - 11.3 K/uL    RBC 5.13 4.50 - 5.90 M/uL    HGB 16.9 13.5 - 17.5 g/dL    HCT 49.5 41 - 53 %    MCV 96.5 80 - 100 FL    MCH 32.9 31 - 34 PG    MCHC 34.1 31.0 - 36.0 g/dL    RDW 18.6 (H) 11.5 - 14.5 %    PLATELET 555 352 - 791 K/uL    MPV 7.8 6.5 - 11.5 FL    NRBC 0.5  WBC    ABSOLUTE NRBC 0.01 K/uL    NEUTROPHILS 47 42 - 75 %    LYMPHOCYTES 44 20.5 - 51.1 %    MONOCYTES 9 1.7 - 9.3 %    EOSINOPHILS 0 (L) 0.9 - 2.9 %    BASOPHILS 0 0.0 - 2.5 %    ABS. NEUTROPHILS 0.8 (L) 1.8 - 7.7 K/UL    ABS. LYMPHOCYTES 0.7 (L) 1.0 - 4.8 K/UL    ABS. MONOCYTES 0.1 (L) 0.2 - 2.4 K/UL    ABS. EOSINOPHILS 0.0 0.0 - 0.7 K/UL    ABS. BASOPHILS 0.0 0.0 - 0.2 K/UL   C REACTIVE PROTEIN, QT    Collection Time: 10/08/21  6:05 AM   Result Value Ref Range    C-Reactive protein 0.42 mg/dL   D DIMER    Collection Time: 10/08/21  6:05 AM   Result Value Ref Range    D-dimer 1.42 (H) 0.19 - 0.50 mg/L FEU   GLUCOSE, POC    Collection Time: 10/08/21  7:41 AM   Result Value Ref Range    Glucose (POC) 111 65 - 117 mg/dL    Performed by Ok Sim    GLUCOSE, POC    Collection Time: 10/08/21 11:10 AM   Result Value Ref Range    Glucose (POC) 135 (H) 65 - 117 mg/dL    Performed by Ok Sim          Imaging:  CTA CHEST W OR W WO CONT    Result Date: 10/7/2021  1. No evidence of pulmonary embolism. 2.  Indeterminate faint groundglass opacities in the right lower lobe. Mild central bronchial wall thickening. 3.  See full report for detailed findings. XR CHEST PORT    Result Date: 10/7/2021  No acute findings.          * Discharge Condition: improved  * Disposition: Home w/Family      Discharge Medications:  Current Discharge Medication List      START taking these medications    Details   albuterol (PROVENTIL HFA, VENTOLIN HFA, PROAIR HFA) 90 mcg/actuation inhaler Take 2 Puffs by inhalation every four (4) hours as needed for Wheezing or Shortness of Breath. Qty: 18 g, Refills: 0  Start date: 10/8/2021      ascorbic acid, vitamin C, (VITAMIN C) 500 mg tablet Take 1 Tablet by mouth two (2) times a day. Qty: 60 Tablet, Refills: 0  Start date: 10/8/2021      azithromycin (ZITHROMAX) 250 mg tablet Take 2 Tablets by mouth daily. Qty: 6 Tablet, Refills: 0  Start date: 10/8/2021      cetirizine (ZYRTEC) 10 mg tablet Take 1 Tablet by mouth daily. Qty: 30 Tablet, Refills: 0  Start date: 10/9/2021      cholecalciferol (VITAMIN D3) (2,000 UNITS /50 MCG) cap capsule Take 1 Capsule by mouth daily. Qty: 30 Capsule, Refills: 0  Start date: 10/9/2021      dexAMETHasone (DECADRON) 2 mg tablet Take 2 Tablets by mouth Daily (before breakfast) for 5 days, THEN 1 Tablet Daily (before breakfast) for 5 days. Qty: 15 Tablet, Refills: 0  Start date: 10/8/2021, End date: 10/18/2021      mometasone-formoterol (DULERA) 200-5 mcg/actuation HFA inhaler Take 2 Puffs by inhalation two (2) times a day. Qty: 1 Each, Refills: 0  Start date: 10/8/2021      montelukast (SINGULAIR) 10 mg tablet Take 1 Tablet by mouth nightly. Qty: 30 Tablet, Refills: 0  Start date: 10/8/2021      nicotine (NICODERM CQ) 14 mg/24 hr patch 1 Patch by TransDERmal route daily for 30 days. Qty: 30 Patch, Refills: 0  Start date: 10/9/2021, End date: 11/8/2021      zinc sulfate (ZINCATE) 50 mg zinc (220 mg) capsule Take 1 Capsule by mouth daily. Qty: 30 Capsule, Refills: 0  Start date: 10/9/2021               * Follow-up Care/Patient Instructions:   Activity: Activity as tolerated  Diet: Regular Diet  Because of patient's new Covid diagnosis will need to self isolate for additional 8 days and will give work excuse but did ask patient to follow-up with employer about policies on returning to work      Follow-up Information     Follow up With Specialties Details Why Contact Info    Otilia Hogue MD Family Medicine On 10/14/2021 @ 11:15 [address] [place] [facility] [place] [phone]          Spent 35 minutes evaluting and coordinating patient care of which >50% was spent coordinating and counseling.      Signed:  Valentin Squires MD  10/8/2021  11:49 AM

## 2021-10-08 NOTE — ROUTINE PROCESS
Patient is preparing for discharge. Patient has been educated to consult with a primary physician for all future medical needs, Vital signs are within normal limits, no signs or symptoms of respiratory distress, pain or acute distress. Patient can make his needs known - he denies respiratory distress, pain and acute distress. Patient signed all disharge paperwork. PICC line removed nos/s of infection or irration. Patient has beeen educated to remain in isolation per cdc guidelines/covid. Patient voiced understanding.

## 2021-10-08 NOTE — PROGRESS NOTES
Care Management Interventions  PCP Verified by CM: No (Patient does not have a PCP. Request referral to Dr. Emilia Albert.)  Mode of Transport at Discharge: Other (see comment) (sister-in-law)  Transition of Care Consult (CM Consult): Discharge Planning  Support Systems: Spouse/Significant Other  Confirm Follow Up Transport: Family  The Plan for Transition of Care is Related to the Following Treatment Goals : Plan to discharge home to self care with PCP referral. No other discharge planning needs identified.    Discharge Location  Discharge Placement: Home

## 2021-10-08 NOTE — PROGRESS NOTES
Problem: Loneliness or Risk for Loneliness  Goal: Demonstrate positive use of time alone when socialization is not possible  10/8/2021 1246 by Marian Packer LPN  Outcome: Resolved/Met  10/8/2021 0950 by Marian Packer LPN  Outcome: Progressing Towards Goal     Problem: Fatigue  Goal: Verbalize increase energy and improved vitality  10/8/2021 1246 by Marian Packer LPN  Outcome: Resolved/Met  10/8/2021 0950 by Marian Packer LPN  Outcome: Progressing Towards Goal     Problem: Patient Education: Go to Patient Education Activity  Goal: Patient/Family Education  10/8/2021 1246 by Marian Packer LPN  Outcome: Resolved/Met  10/8/2021 0950 by Marian Packer LPN  Outcome: Progressing Towards Goal     Problem: Falls - Risk of  Goal: *Absence of Falls  Description: Document Toby Castillo Fall Risk and appropriate interventions in the flowsheet. 10/8/2021 1246 by Marian Packer LPN  Outcome: Resolved/Met  Note: Fall Risk Interventions:                             10/8/2021 0950 by Marian Packer LPN  Outcome: Progressing Towards Goal  Note: Fall Risk Interventions:                                Problem: Patient Education: Go to Patient Education Activity  Goal: Patient/Family Education  10/8/2021 1246 by Marian Packer LPN  Outcome: Resolved/Met  10/8/2021 0950 by Marian Packer LPN  Outcome: Progressing Towards Goal     Problem: Falls - Risk of  Goal: *Absence of Falls  Description: Document Toby Castillo Fall Risk and appropriate interventions in the flowsheet.   10/8/2021 1246 by Marian Packer LPN  Outcome: Resolved/Met  Note: Fall Risk Interventions:                             10/8/2021 0950 by Marian Packer LPN  Outcome: Progressing Towards Goal  Note: Fall Risk Interventions:

## 2021-10-08 NOTE — PROGRESS NOTES
Problem: Patient Education: Go to Patient Education Activity  Goal: Patient/Family Education  10/8/2021 1246 by Valentin Powell LPN  Outcome: Resolved/Met  10/8/2021 0950 by Valentin Powell LPN  Outcome: Progressing Towards Goal

## 2021-10-08 NOTE — PROGRESS NOTES
Patients case reviewed during interdisciplinary team meeting in [address] [place], [place] [address]Acute Care Unit. Rev.  Nic Melo [place], [address] [place] [address] Road

## 2021-10-08 NOTE — PROGRESS NOTES
[address] October 8, 2021       RE: Kj Amador. To Whom It May Concern,    This is to certify that Kj Bro was under my service while at [facility] and was admitted on 10/7 and will need to be off work for additional 8 days and can return to work anytime after 17 of October  Please feel free to contact my office if you have any questions or concerns. Thank you for your assistance in this matter.       Sincerely,  Tobin Hull MD

## 2021-10-09 LAB — CRP SERPL-MCNC: 0.38 MG/DL

## 2021-10-10 LAB — FERRITIN SERPL-MCNC: 173 NG/ML (ref 26–388)

## 2021-10-11 ENCOUNTER — PATIENT OUTREACH (OUTPATIENT)
Dept: CASE MANAGEMENT | Age: 41
End: 2021-10-11

## 2021-10-11 NOTE — PROGRESS NOTES
Patient contacted regarding COVID-19 diagnosis. Discussed COVID-19 related testing which was available at this time. Test results were positive. Patient informed of results, if available? yes. Patient unvaccinated against [address]. Is currently interested in receiving vaccination. Will set up with the [facility] department. Care Transition Nurse contacted the patient by telephone to perform post discharge assessment. Call within 2 business days of discharge: Yes Verified name and  with patient as identifiers. Provided introduction to self, and explanation of the CTN/ACM role, and reason for call due to risk factors for infection and/or exposure to COVID-19. Symptoms reviewed with patient who verbalized the following symptoms: no new symptoms and no worsening symptoms      Due to no new or worsening symptoms encounter was not routed to provider for escalation. Discussed follow-up appointments. If no appointment was previously scheduled, appointment scheduling offered:  yes. [facility] follow up appointment(s): No future appointments. Non-[facility] follow up appointment(s): PCP 10/13    Interventions to address risk factors: Obtained and reviewed discharge summary and/or continuity of care documents     Advance Care Planning:   Does patient have an Advance Directive: decision makers updated. Educated patient about risk for severe COVID-19 due to risk factors according to CDC guidelines. CTN reviewed discharge instructions, medical action plan and red flag symptoms with the patient who verbalized understanding. Discussed COVID vaccination status: yes. Education provided on COVID-19 vaccination as appropriate. Discussed exposure protocols and quarantine with CDC Guidelines. Patient was given an opportunity to verbalize any questions and concerns and agrees to contact CTN or health care provider for questions related to their healthcare.     Reviewed and educated patient on any new and changed medications related to discharge diagnosis     Was patient discharged with a pulse oximeter? no    CTN provided contact information. Plan for follow-up call in 5-7 days based on severity of symptoms and risk factors.

## 2021-10-13 LAB
BACTERIA SPEC CULT: NORMAL
BACTERIA SPEC CULT: NORMAL
SPECIAL REQUESTS,SREQ: NORMAL
SPECIAL REQUESTS,SREQ: NORMAL

## 2021-10-18 ENCOUNTER — PATIENT OUTREACH (OUTPATIENT)
Dept: CASE MANAGEMENT | Age: 41
End: 2021-10-18

## 2021-10-18 NOTE — PROGRESS NOTES
Patient contacted regarding COVID-19 diagnosis. Discussed COVID-19 related testing which was available at this time. Test results were positive. Patient informed of results, if available? yes      Care Transition Nurse contacted the patient by telephone to perform follow-up assessment. Verified name and  with patient as identifiers. Patient has following risk factors of: no known risk factors. Symptoms reviewed with patient who verbalized the following symptoms: no new symptoms and no worsening symptoms. Due to no new or worsening symptoms encounter was not routed to provider for escalation. Interventions to address risk factors: Obtained and reviewed discharge summary and/or continuity of care documents    Educated patient about risk for severe COVID-19 due to risk factors according to CDC guidelines. CTN reviewed discharge instructions, medical action plan and red flag symptoms with the patient who verbalized understanding. Discussed COVID vaccination status: yes. Education provided on COVID-19 vaccination as appropriate. Discussed exposure protocols and quarantine with CDC Guidelines. Patient was given an opportunity to verbalize any questions and concerns and agrees to contact CTN or health care provider for questions related to their healthcare. Reviewed and educated patient on any new and changed medications related to discharge diagnosis       CTN provided contact information. Plan for follow-up call in 5-7 days based on severity of symptoms and risk factors.

## 2021-10-27 ENCOUNTER — PATIENT OUTREACH (OUTPATIENT)
Dept: CASE MANAGEMENT | Age: 41
End: 2021-10-27

## 2022-01-14 ENCOUNTER — APPOINTMENT (OUTPATIENT)
Dept: CT IMAGING | Age: 42
End: 2022-01-14
Attending: EMERGENCY MEDICINE
Payer: MEDICAID

## 2022-01-14 ENCOUNTER — HOSPITAL ENCOUNTER (EMERGENCY)
Age: 42
Discharge: HOME | End: 2022-01-14
Attending: EMERGENCY MEDICINE
Payer: MEDICAID

## 2022-01-14 VITALS
HEIGHT: 71 IN | BODY MASS INDEX: 20.58 KG/M2 | WEIGHT: 147 LBS | TEMPERATURE: 98.7 F | OXYGEN SATURATION: 95 % | RESPIRATION RATE: 16 BRPM | DIASTOLIC BLOOD PRESSURE: 89 MMHG | SYSTOLIC BLOOD PRESSURE: 132 MMHG | HEART RATE: 83 BPM

## 2022-01-14 DIAGNOSIS — R42 DIZZINESS: ICD-10-CM

## 2022-01-14 DIAGNOSIS — F10.20 ALCOHOL USE DISORDER, MODERATE, DEPENDENCE (HCC): ICD-10-CM

## 2022-01-14 DIAGNOSIS — I10 HYPERTENSION, UNSPECIFIED TYPE: Primary | ICD-10-CM

## 2022-01-14 LAB
ALBUMIN SERPL-MCNC: 3.5 G/DL (ref 3.5–5)
ALBUMIN/GLOB SERPL: 1 {RATIO} (ref 1.1–2.2)
ALP SERPL-CCNC: 136 U/L (ref 45–117)
ALT SERPL-CCNC: 183 U/L (ref 12–78)
ANION GAP SERPL CALC-SCNC: 13 MMOL/L (ref 5–15)
AST SERPL W P-5'-P-CCNC: 867 U/L (ref 15–37)
ATRIAL RATE: 87 BPM
BASOPHILS # BLD: 0 K/UL (ref 0–0.2)
BASOPHILS NFR BLD: 1 % (ref 0–2.5)
BILIRUB SERPL-MCNC: 0.8 MG/DL (ref 0.2–1)
BUN SERPL-MCNC: 15 MG/DL (ref 6–20)
BUN/CREAT SERPL: 19 (ref 12–20)
CA-I BLD-MCNC: 7.7 MG/DL (ref 8.5–10.1)
CALCULATED P AXIS, ECG09: 63 DEGREES
CALCULATED R AXIS, ECG10: 67 DEGREES
CALCULATED T AXIS, ECG11: 56 DEGREES
CHLORIDE SERPL-SCNC: 99 MMOL/L (ref 97–108)
CO2 SERPL-SCNC: 27 MMOL/L (ref 21–32)
CREAT SERPL-MCNC: 0.81 MG/DL (ref 0.7–1.3)
DIAGNOSIS, 93000: NORMAL
EOSINOPHIL # BLD: 0 K/UL (ref 0–0.7)
EOSINOPHIL NFR BLD: 1 % (ref 0.9–2.9)
ERYTHROCYTE [DISTWIDTH] IN BLOOD BY AUTOMATED COUNT: 17.9 % (ref 11.5–14.5)
GLOBULIN SER CALC-MCNC: 3.5 G/DL (ref 2–4)
GLUCOSE SERPL-MCNC: 95 MG/DL (ref 65–100)
HCT VFR BLD AUTO: 39.2 % (ref 41–53)
HGB BLD-MCNC: 13.9 G/DL (ref 13.5–17.5)
INR PPP: 1.3 (ref 0.9–1.1)
LYMPHOCYTES # BLD: 1.2 K/UL (ref 1–4.8)
LYMPHOCYTES NFR BLD: 34 % (ref 20.5–51.1)
MAGNESIUM SERPL-MCNC: 1.4 MG/DL (ref 1.6–2.4)
MCH RBC QN AUTO: 36.7 PG (ref 31–34)
MCHC RBC AUTO-ENTMCNC: 35.5 G/DL (ref 31–36)
MCV RBC AUTO: 103.3 FL (ref 80–100)
MONOCYTES # BLD: 0.3 K/UL (ref 0.2–2.4)
MONOCYTES NFR BLD: 10 % (ref 1.7–9.3)
NEUTS SEG # BLD: 1.9 K/UL (ref 1.8–7.7)
NEUTS SEG NFR BLD: 54 % (ref 42–75)
NRBC # BLD: 0.01 K/UL
NRBC BLD-RTO: 0.4 PER 100 WBC
P-R INTERVAL, ECG05: 101 MS
PLATELET # BLD AUTO: 200 K/UL (ref 150–400)
PMV BLD AUTO: 6.8 FL (ref 6.5–11.5)
POTASSIUM SERPL-SCNC: 3.5 MMOL/L (ref 3.5–5.1)
PROT SERPL-MCNC: 7 G/DL (ref 6.4–8.2)
PROTHROMBIN TIME: 13.1 SEC (ref 9–11.1)
Q-T INTERVAL, ECG07: 366 MS
QRS DURATION, ECG06: 80 MS
QTC CALCULATION (BEZET), ECG08: 443 MS
RBC # BLD AUTO: 3.79 M/UL (ref 4.5–5.9)
SODIUM SERPL-SCNC: 139 MMOL/L (ref 136–145)
TROPONIN-HIGH SENSITIVITY: 8 NG/L (ref 0–76)
VENTRICULAR RATE, ECG03: 88 BPM
WBC # BLD AUTO: 3.4 K/UL (ref 4.4–11.3)

## 2022-01-14 PROCEDURE — 36415 COLL VENOUS BLD VENIPUNCTURE: CPT

## 2022-01-14 PROCEDURE — 80053 COMPREHEN METABOLIC PANEL: CPT

## 2022-01-14 PROCEDURE — 99284 EMERGENCY DEPT VISIT MOD MDM: CPT

## 2022-01-14 PROCEDURE — 83735 ASSAY OF MAGNESIUM: CPT

## 2022-01-14 PROCEDURE — 70450 CT HEAD/BRAIN W/O DYE: CPT

## 2022-01-14 PROCEDURE — 85025 COMPLETE CBC W/AUTO DIFF WBC: CPT

## 2022-01-14 PROCEDURE — 84484 ASSAY OF TROPONIN QUANT: CPT

## 2022-01-14 PROCEDURE — 93005 ELECTROCARDIOGRAM TRACING: CPT

## 2022-01-14 PROCEDURE — 85610 PROTHROMBIN TIME: CPT

## 2022-01-14 RX ORDER — CLONIDINE HYDROCHLORIDE 0.1 MG/1
0.2 TABLET ORAL
Status: DISCONTINUED | OUTPATIENT
Start: 2022-01-14 | End: 2022-01-14 | Stop reason: HOSPADM

## 2022-01-14 NOTE — DISCHARGE INSTRUCTIONS
Recommendation AAA and cessation of alcohol. Follow-up with primary care doctor for further evaluation or blood pressure with treatment if needed.

## 2022-01-14 NOTE — ED PROVIDER NOTES
EMERGENCY DEPARTMENT HISTORY AND PHYSICAL EXAM      Date: (Not on file)  Patient Name: Grady Kim. History of Presenting Illness     No chief complaint on file. History Provided By: Patient    HPI: Grady Kim., 42 y.o. male with a past medical history significant and asthma presents to the ED with cc of high blood pressure. He woke up this morning feeling dizzy without SOB or CP, nausea, or vomiting. Patient drinks alcohol and smokes cigarettes. He denies any Covid exposure. No previous episode of dizziness. There are no other complaints, changes, or physical findings at this time. PCP: None    No current facility-administered medications on file prior to encounter. Current Outpatient Medications on File Prior to Encounter   Medication Sig Dispense Refill    albuterol (PROVENTIL HFA, VENTOLIN HFA, PROAIR HFA) 90 mcg/actuation inhaler Take 2 Puffs by inhalation every four (4) hours as needed for Wheezing or Shortness of Breath. 18 g 0    ascorbic acid, vitamin C, (VITAMIN C) 500 mg tablet Take 1 Tablet by mouth two (2) times a day. 60 Tablet 0    azithromycin (ZITHROMAX) 250 mg tablet Take 2 Tablets by mouth daily. 6 Tablet 0    cetirizine (ZYRTEC) 10 mg tablet Take 1 Tablet by mouth daily. 30 Tablet 0    cholecalciferol (VITAMIN D3) (2,000 UNITS /50 MCG) cap capsule Take 1 Capsule by mouth daily. 30 Capsule 0    mometasone-formoterol (DULERA) 200-5 mcg/actuation HFA inhaler Take 2 Puffs by inhalation two (2) times a day. 1 Each 0    montelukast (SINGULAIR) 10 mg tablet Take 1 Tablet by mouth nightly. 30 Tablet 0    zinc sulfate (ZINCATE) 50 mg zinc (220 mg) capsule Take 1 Capsule by mouth daily. 30 Capsule 0       Past History     Past Medical History:  No past medical history on file. Past Surgical History:  No past surgical history on file. Family History:  No family history on file.   No family hx of CVA or MI  Social History:  Social History     Tobacco Use    Smoking status: Current Every Day Smoker     Packs/day: 0.25    Smokeless tobacco: Never Used   Vaping Use    Vaping Use: Never used   Substance Use Topics    Alcohol use: Yes     Comment: occasionally     Drug use: Never       Allergies:  No Known Allergies      Review of Systems     Review of Systems   Constitutional: Negative. Negative for chills and fever. HENT: Negative. Eyes: Negative. Respiratory: Negative. Cardiovascular: Negative. Gastrointestinal: Negative. Endocrine: Negative. Genitourinary: Negative. Musculoskeletal: Negative. Skin: Negative. Allergic/Immunologic: Negative. Neurological: Positive for dizziness. Negative for seizures and light-headedness. Hematological: Negative. Psychiatric/Behavioral: Negative. All other systems reviewed and are negative. Physical Exam     Physical Exam  Vitals and nursing note reviewed. Constitutional:       Appearance: Normal appearance. HENT:      Head: Normocephalic. Nose: Nose normal.      Mouth/Throat:      Mouth: Mucous membranes are moist.   Eyes:      Pupils: Pupils are equal, round, and reactive to light. Cardiovascular:      Rate and Rhythm: Normal rate. Pulmonary:      Effort: Pulmonary effort is normal.   Abdominal:      General: Abdomen is flat. Musculoskeletal:         General: Tenderness present. Normal range of motion. Cervical back: Normal range of motion. Comments: Tenderness on the right side   Skin:     General: Skin is warm. Capillary Refill: Capillary refill takes less than 2 seconds. Neurological:      General: No focal deficit present. Mental Status: He is alert and oriented to person, place, and time. Psychiatric:         Mood and Affect: Mood normal.         Lab and Diagnostic Study Results     Labs -   No results found for this or any previous visit (from the past 12 hour(s)).     Radiologic Studies -   @lastxrresult@  CT Results  (Last 48 hours)    None CXR Results  (Last 48 hours)    None            Medical Decision Making   - I am the first provider for this patient. - I reviewed the vital signs, available nursing notes, past medical history, past surgical history, family history and social history. - Initial assessment performed. The patients presenting problems have been discussed, and they are in agreement with the care plan formulated and outlined with them. I have encouraged them to ask questions as they arise throughout their visit. Vital Signs-Reviewed the patient's vital signs. No data found. Records Reviewed: Nursing Notes    The patient presents with dizziness with a differential diagnosis of  cardiac dysrhythm, dizziness/vertigo, generalized weakness, GI bleed/hypovolemia, hypertension, labrynthitis, syncope/loss of consciousness, TIA and vasovagal episode      ED Course:          Provider Notes (Medical Decision Making): MDM       Procedures   Medical Decision Makingedical Decision Making  Performed by: Rosi Gonzalez MD  PROCEDURES:Procedures       Disposition   Disposition: Condition stable  DC- Adult Discharges: All of the diagnostic tests were reviewed and questions answered. Diagnosis, care plan and treatment options were discussed. The patient understands the instructions and will follow up as directed. The patients results have been reviewed with them. They have been counseled regarding their diagnosis. The patient verbally convey understanding and agreement of the signs, symptoms, diagnosis, treatment and prognosis and additionally agrees to follow up as recommended with their PCP in 24 - 48 hours. They also agree with the care-plan and convey that all of their questions have been answered.   I have also put together some discharge instructions for them that include: 1) educational information regarding their diagnosis, 2) how to care for their diagnosis at home, as well a 3) list of reasons why they would want to return to the ED prior to their follow-up appointment, should their condition change. DISCHARGE PLAN:  1. Current Discharge Medication List      CONTINUE these medications which have NOT CHANGED    Details   albuterol (PROVENTIL HFA, VENTOLIN HFA, PROAIR HFA) 90 mcg/actuation inhaler Take 2 Puffs by inhalation every four (4) hours as needed for Wheezing or Shortness of Breath. Qty: 18 g, Refills: 0      ascorbic acid, vitamin C, (VITAMIN C) 500 mg tablet Take 1 Tablet by mouth two (2) times a day. Qty: 60 Tablet, Refills: 0      azithromycin (ZITHROMAX) 250 mg tablet Take 2 Tablets by mouth daily. Qty: 6 Tablet, Refills: 0      cetirizine (ZYRTEC) 10 mg tablet Take 1 Tablet by mouth daily. Qty: 30 Tablet, Refills: 0      cholecalciferol (VITAMIN D3) (2,000 UNITS /50 MCG) cap capsule Take 1 Capsule by mouth daily. Qty: 30 Capsule, Refills: 0      mometasone-formoterol (DULERA) 200-5 mcg/actuation HFA inhaler Take 2 Puffs by inhalation two (2) times a day. Qty: 1 Each, Refills: 0      montelukast (SINGULAIR) 10 mg tablet Take 1 Tablet by mouth nightly. Qty: 30 Tablet, Refills: 0      zinc sulfate (ZINCATE) 50 mg zinc (220 mg) capsule Take 1 Capsule by mouth daily. Qty: 30 Capsule, Refills: 0           2. Follow-up Information    None       3. Return to ED if worse   4. Current Discharge Medication List            Diagnosis     Clinical Impression:    ICD-10-CM ICD-9-CM    1. Hypertension, unspecified type  I10 401.9    2. Dizziness  R42 780.4    3. Alcohol use disorder, moderate, dependence ([facility]  F10.20 303.90      Attestations:    Dede Stanley MD    Please note that this dictation was completed with M_[employer]UTION, the [employer] voice recognition software. Quite often unanticipated grammatical, syntax, homophones, and other interpretive errors are inadvertently transcribed by the computer software. Please disregard these errors. Please excuse any errors that have escaped final proofreading. Thank you.

## 2022-01-14 NOTE — Clinical Note
[address]  [facility] EMERGENCY DEPT  [place] [phone]  [phone]    Work/School Note    Date: 1/14/2022    To Whom It May concern:    Rossy Velasquez was seen and treated today in the emergency room by the following provider(s):  No providers found. Rossy Velasquez is excused from work/school on 1/14/2022 through 1/16/2022. He is medically clear to return to work/school on 1/17/2022.          Sincerely,          Rosi Gonzalez MD

## 2022-01-14 NOTE — ED TRIAGE NOTES
.  Chief Complaint   Patient presents with    Dizziness     Pt presents to ED with c/o dizziness that has been ongoing since waking this morning. Pt A&ox4, pt in NAD, Hypertensive on arrival at 152/115. Pt states that he has had seizures in past but has not been diagnosed with them or take any medications at home.

## 2022-03-11 ENCOUNTER — APPOINTMENT (OUTPATIENT)
Dept: CT IMAGING | Age: 42
End: 2022-03-11
Attending: EMERGENCY MEDICINE
Payer: MEDICAID

## 2022-03-11 ENCOUNTER — APPOINTMENT (OUTPATIENT)
Dept: GENERAL RADIOLOGY | Age: 42
End: 2022-03-11
Attending: EMERGENCY MEDICINE
Payer: MEDICAID

## 2022-03-11 ENCOUNTER — HOSPITAL ENCOUNTER (EMERGENCY)
Age: 42
Discharge: HOME | End: 2022-03-11
Attending: EMERGENCY MEDICINE
Payer: MEDICAID

## 2022-03-11 VITALS
TEMPERATURE: 97.6 F | SYSTOLIC BLOOD PRESSURE: 131 MMHG | HEART RATE: 100 BPM | OXYGEN SATURATION: 99 % | DIASTOLIC BLOOD PRESSURE: 88 MMHG | BODY MASS INDEX: 20.76 KG/M2 | HEIGHT: 70 IN | RESPIRATION RATE: 16 BRPM | WEIGHT: 145 LBS

## 2022-03-11 DIAGNOSIS — R42 DIZZINESS: ICD-10-CM

## 2022-03-11 DIAGNOSIS — F10.20 ALCOHOL USE DISORDER, MODERATE, DEPENDENCE (HCC): ICD-10-CM

## 2022-03-11 DIAGNOSIS — N39.0 URINARY TRACT INFECTION WITHOUT HEMATURIA, SITE UNSPECIFIED: Primary | ICD-10-CM

## 2022-03-11 DIAGNOSIS — S01.81XA FACIAL LACERATION, INITIAL ENCOUNTER: ICD-10-CM

## 2022-03-11 LAB
ALBUMIN SERPL-MCNC: 3.5 G/DL (ref 3.5–5)
ALBUMIN/GLOB SERPL: 0.9 {RATIO} (ref 1.1–2.2)
ALP SERPL-CCNC: 708 U/L (ref 45–117)
ALT SERPL-CCNC: 166 U/L (ref 12–78)
AMPHET UR QL SCN: NEGATIVE
ANION GAP SERPL CALC-SCNC: 20 MMOL/L (ref 5–15)
APPEARANCE UR: ABNORMAL
AST SERPL W P-5'-P-CCNC: 316 U/L (ref 15–37)
BACTERIA URNS QL MICRO: NEGATIVE /HPF
BARBITURATES UR QL SCN: NEGATIVE
BASOPHILS # BLD: 0.2 K/UL (ref 0–0.2)
BASOPHILS NFR BLD: 3 % (ref 0–2.5)
BENZODIAZ UR QL: NEGATIVE
BILIRUB SERPL-MCNC: 8.1 MG/DL (ref 0.2–1)
BILIRUB UR QL CFM: POSITIVE
BUN SERPL-MCNC: 12 MG/DL (ref 6–20)
BUN/CREAT SERPL: 10 (ref 12–20)
CA-I BLD-MCNC: 8.7 MG/DL (ref 8.5–10.1)
CANNABINOIDS UR QL SCN: POSITIVE
CHLORIDE SERPL-SCNC: 93 MMOL/L (ref 97–108)
CO2 SERPL-SCNC: 19 MMOL/L (ref 21–32)
COCAINE UR QL SCN: NEGATIVE
COLOR UR: ABNORMAL
CREAT SERPL-MCNC: 1.24 MG/DL (ref 0.7–1.3)
DRUG SCRN COMMENT,DRGCM: ABNORMAL
ECSTASY, ECST: NEGATIVE
EOSINOPHIL # BLD: 0 K/UL (ref 0–0.7)
EOSINOPHIL NFR BLD: 0 % (ref 0.9–2.9)
ERYTHROCYTE [DISTWIDTH] IN BLOOD BY AUTOMATED COUNT: 18.5 % (ref 11.5–14.5)
ETHANOL SERPL-MCNC: <4 MG/DL
GLOBULIN SER CALC-MCNC: 4 G/DL (ref 2–4)
GLUCOSE SERPL-MCNC: 142 MG/DL (ref 65–100)
GLUCOSE UR STRIP.AUTO-MCNC: NEGATIVE MG/DL
HCT VFR BLD AUTO: 41.2 % (ref 41–53)
HGB BLD-MCNC: 13.3 G/DL (ref 13.5–17.5)
HGB UR QL STRIP: NEGATIVE
INR PPP: 1.2 (ref 0.9–1.1)
KETONES UR QL STRIP.AUTO: ABNORMAL MG/DL
LEUKOCYTE ESTERASE UR QL STRIP.AUTO: NEGATIVE
LIPASE SERPL-CCNC: 36 U/L (ref 73–393)
LYMPHOCYTES # BLD: 3 K/UL (ref 1–4.8)
LYMPHOCYTES NFR BLD: 51 % (ref 20.5–51.1)
MAGNESIUM SERPL-MCNC: 1.5 MG/DL (ref 1.6–2.4)
MCH RBC QN AUTO: 35 PG (ref 31–34)
MCHC RBC AUTO-ENTMCNC: 32.2 G/DL (ref 31–36)
MCV RBC AUTO: 108.8 FL (ref 80–100)
METHADONE UR QL: NEGATIVE
MONOCYTES # BLD: 0.2 K/UL (ref 0.2–2.4)
MONOCYTES NFR BLD: 4 % (ref 1.7–9.3)
NEUTS SEG # BLD: 2.4 K/UL (ref 1.8–7.7)
NEUTS SEG NFR BLD: 42 % (ref 42–75)
NITRITE UR QL STRIP.AUTO: NEGATIVE
NRBC # BLD: 0.05 K/UL
NRBC BLD-RTO: 0.8 PER 100 WBC
OPIATES UR QL: NEGATIVE
PCP UR QL: NEGATIVE
PH UR STRIP: 6.5 [PH] (ref 5–8)
PLATELET # BLD AUTO: 189 K/UL (ref 150–400)
PMV BLD AUTO: 8.6 FL (ref 6.5–11.5)
POTASSIUM SERPL-SCNC: 3.9 MMOL/L (ref 3.5–5.1)
PROT SERPL-MCNC: 7.5 G/DL (ref 6.4–8.2)
PROT UR STRIP-MCNC: 30 MG/DL
PROTHROMBIN TIME: 14.9 SEC (ref 11.9–14.6)
RBC # BLD AUTO: 3.79 M/UL (ref 4.5–5.9)
RBC #/AREA URNS HPF: NORMAL /HPF (ref 0–3)
SODIUM SERPL-SCNC: 132 MMOL/L (ref 136–145)
SP GR UR REFRACTOMETRY: 1.02 (ref 1–1.03)
TROPONIN-HIGH SENSITIVITY: <4 NG/L (ref 0–76)
UROBILINOGEN UR QL STRIP.AUTO: 1 EU/DL (ref 0.2–1)
WBC # BLD AUTO: 5.9 K/UL (ref 4.4–11.3)
WBC URNS QL MICRO: NORMAL /HPF (ref 0–5)

## 2022-03-11 PROCEDURE — 82077 ASSAY SPEC XCP UR&BREATH IA: CPT

## 2022-03-11 PROCEDURE — 84484 ASSAY OF TROPONIN QUANT: CPT

## 2022-03-11 PROCEDURE — 85610 PROTHROMBIN TIME: CPT

## 2022-03-11 PROCEDURE — 80307 DRUG TEST PRSMV CHEM ANLYZR: CPT

## 2022-03-11 PROCEDURE — 70450 CT HEAD/BRAIN W/O DYE: CPT

## 2022-03-11 PROCEDURE — 85025 COMPLETE CBC W/AUTO DIFF WBC: CPT

## 2022-03-11 PROCEDURE — 83690 ASSAY OF LIPASE: CPT

## 2022-03-11 PROCEDURE — 74011250637 HC RX REV CODE- 250/637: Performed by: EMERGENCY MEDICINE

## 2022-03-11 PROCEDURE — 36415 COLL VENOUS BLD VENIPUNCTURE: CPT

## 2022-03-11 PROCEDURE — 83735 ASSAY OF MAGNESIUM: CPT

## 2022-03-11 PROCEDURE — 74011250636 HC RX REV CODE- 250/636: Performed by: EMERGENCY MEDICINE

## 2022-03-11 PROCEDURE — 93005 ELECTROCARDIOGRAM TRACING: CPT

## 2022-03-11 PROCEDURE — 99285 EMERGENCY DEPT VISIT HI MDM: CPT

## 2022-03-11 PROCEDURE — 80053 COMPREHEN METABOLIC PANEL: CPT

## 2022-03-11 PROCEDURE — 71045 X-RAY EXAM CHEST 1 VIEW: CPT

## 2022-03-11 PROCEDURE — 81001 URINALYSIS AUTO W/SCOPE: CPT

## 2022-03-11 RX ORDER — AMOXICILLIN 500 MG/1
500 TABLET, FILM COATED ORAL 3 TIMES DAILY
Qty: 30 TABLET | Refills: 0 | Status: SHIPPED | OUTPATIENT
Start: 2022-03-11 | End: 2022-03-31

## 2022-03-11 RX ORDER — AMOXICILLIN 250 MG/1
500 CAPSULE ORAL
Status: COMPLETED | OUTPATIENT
Start: 2022-03-11 | End: 2022-03-11

## 2022-03-11 RX ADMIN — AMOXICILLIN 500 MG: 250 CAPSULE ORAL at 14:49

## 2022-03-11 RX ADMIN — SODIUM CHLORIDE 1000 ML: 9 INJECTION, SOLUTION INTRAVENOUS at 13:27

## 2022-03-11 NOTE — Clinical Note
[address] [facility] EMERGENCY DEPT  [place] [address] [phone]  [phone]    Work/School Note    Date: 3/11/2022    To Whom It May concern:    Lacie Jose. was seen and treated today in the emergency room by the following provider(s):  Attending Provider: Jame Fay MD.      Lacie Jose. is excused from work/school on 3/11/2022 through 3/13/2022. He is medically clear to return to work/school on 3/14/2022.          Sincerely,          Tahira Karimi MD

## 2022-03-11 NOTE — Clinical Note
[address]  [facility] EMERGENCY DEPT  [place] [address] [phone]  [phone]    Work/School Note    Date: 3/11/2022    To Whom It May concern:    Souleymane Umana. was seen and treated today in the emergency room by the following provider(s):  Attending Provider: Heather Schwartz MD.      Souleymane Umana. is excused from work/school on 3/11/2022 through 3/13/2022. He is medically clear to return to work/school on 3/14/2022.          Sincerely,          Saba Liao RN

## 2022-03-11 NOTE — ED PROVIDER NOTES
EMERGENCY DEPARTMENT HISTORY AND PHYSICAL EXAM      Date: 3/11/2022  Patient Name: Lyudmila Greenwood. History of Presenting Illness     Chief Complaint   Patient presents with    Dizziness       History Provided By: Patient    HPI: Lyudmila Greenwood., 42 y.o. male with a past medical history significant asthma presents to the ED with cc of dizziness after helping someone move boxes at work and pass out hitting the left side of his head on busket  With small laceration behind left ear. Patient drank ETOH. There are no other complaints, changes, or physical findings at this time. PCP: None    No current facility-administered medications on file prior to encounter. Current Outpatient Medications on File Prior to Encounter   Medication Sig Dispense Refill    albuterol (PROVENTIL HFA, VENTOLIN HFA, PROAIR HFA) 90 mcg/actuation inhaler Take 2 Puffs by inhalation every four (4) hours as needed for Wheezing or Shortness of Breath. 18 g 0    ascorbic acid, vitamin C, (VITAMIN C) 500 mg tablet Take 1 Tablet by mouth two (2) times a day. 60 Tablet 0    azithromycin (ZITHROMAX) 250 mg tablet Take 2 Tablets by mouth daily. 6 Tablet 0    cetirizine (ZYRTEC) 10 mg tablet Take 1 Tablet by mouth daily. 30 Tablet 0    cholecalciferol (VITAMIN D3) (2,000 UNITS /50 MCG) cap capsule Take 1 Capsule by mouth daily. 30 Capsule 0    mometasone-formoterol (DULERA) 200-5 mcg/actuation HFA inhaler Take 2 Puffs by inhalation two (2) times a day. 1 Each 0    montelukast (SINGULAIR) 10 mg tablet Take 1 Tablet by mouth nightly. 30 Tablet 0    zinc sulfate (ZINCATE) 50 mg zinc (220 mg) capsule Take 1 Capsule by mouth daily. 30 Capsule 0       Past History     Past Medical History:  History reviewed. No pertinent past medical history. Past Surgical History:  History reviewed. No pertinent surgical history. Family History:  History reviewed. No pertinent family history.     Social History:  Social History     Tobacco Use    Smoking status: Current Every Day Smoker     Packs/day: 0.25    Smokeless tobacco: Never Used   Vaping Use    Vaping Use: Never used   Substance Use Topics    Alcohol use: Yes     Comment: occasionally     Drug use: Never       Allergies:  No Known Allergies      Review of Systems     Review of Systems   Constitutional: Negative. HENT:        Small laceration behind left ear   Eyes: Negative. Jaundice sclera   Respiratory: Negative. Cardiovascular: Negative. Gastrointestinal: Negative. Endocrine: Negative. Genitourinary: Negative. Musculoskeletal: Negative. Skin: Negative. Allergic/Immunologic: Negative. Neurological: Positive for dizziness and syncope. Hematological: Negative. Psychiatric/Behavioral: Negative. All other systems reviewed and are negative. Physical Exam     Physical Exam  Vitals and nursing note reviewed. Constitutional:       Appearance: Normal appearance. HENT:      Head: Normocephalic. Ears:      Comments: Small laceration behind left ear     Nose: Nose normal.      Mouth/Throat:      Mouth: Mucous membranes are moist.   Eyes:      General: Scleral icterus present. Pupils: Pupils are equal, round, and reactive to light. Cardiovascular:      Rate and Rhythm: Normal rate. Pulmonary:      Effort: Pulmonary effort is normal.   Abdominal:      General: Abdomen is flat. Musculoskeletal:         General: Tenderness present. Normal range of motion. Cervical back: Normal range of motion. Comments: Tenderness on the right side   Skin:     General: Skin is warm. Capillary Refill: Capillary refill takes less than 2 seconds. Neurological:      General: No focal deficit present. Mental Status: He is alert and oriented to person, place, and time.    Psychiatric:         Mood and Affect: Mood normal.         Lab and Diagnostic Study Results     Labs -   No results found for this or any previous visit (from the past 12 hour(s)). Radiologic Studies -   @lastxrresult@  CT Results  (Last 48 hours)    None        CXR Results  (Last 48 hours)    None            Medical Decision Making   - I am the first provider for this patient. - I reviewed the vital signs, available nursing notes, past medical history, past surgical history, family history and social history. - Initial assessment performed. The patients presenting problems have been discussed, and they are in agreement with the care plan formulated and outlined with them. I have encouraged them to ask questions as they arise throughout their visit. Vital Signs-Reviewed the patient's vital signs. Patient Vitals for the past 12 hrs:   Temp Pulse Resp BP SpO2   03/11/22 1249 97.6 °F (36.4 °C) 100 16 131/88 99 %       Records Reviewed: Nursing Notes    The patient presents with dizziness with a differential diagnosis of  cardiac dysrhythm, dizziness/vertigo, generalized weakness, GI bleed/hypovolemia, hypertension, labrynthitis, syncope/loss of consciousness, TIA and vasovagal episode      ED Course:          Provider Notes (Medical Decision Making): MDM       Procedures   Medical Decision Makingedical Decision Making  Performed by: Ewa Preciado MD  PROCEDURES:Procedures       Disposition   Disposition: Condition stable  DC- Adult Discharges: All of the diagnostic tests were reviewed and questions answered. Diagnosis, care plan and treatment options were discussed. The patient understands the instructions and will follow up as directed. The patients results have been reviewed with them. They have been counseled regarding their diagnosis. The patient verbally convey understanding and agreement of the signs, symptoms, diagnosis, treatment and prognosis and additionally agrees to follow up as recommended with their PCP in 24 - 48 hours. They also agree with the care-plan and convey that all of their questions have been answered.   I have also put together some discharge instructions for them that include: 1) educational information regarding their diagnosis, 2) how to care for their diagnosis at home, as well a 3) list of reasons why they would want to return to the ED prior to their follow-up appointment, should their condition change. DISCHARGE PLAN:  1. Current Discharge Medication List      CONTINUE these medications which have NOT CHANGED    Details   albuterol (PROVENTIL HFA, VENTOLIN HFA, PROAIR HFA) 90 mcg/actuation inhaler Take 2 Puffs by inhalation every four (4) hours as needed for Wheezing or Shortness of Breath. Qty: 18 g, Refills: 0      ascorbic acid, vitamin C, (VITAMIN C) 500 mg tablet Take 1 Tablet by mouth two (2) times a day. Qty: 60 Tablet, Refills: 0      azithromycin (ZITHROMAX) 250 mg tablet Take 2 Tablets by mouth daily. Qty: 6 Tablet, Refills: 0      cetirizine (ZYRTEC) 10 mg tablet Take 1 Tablet by mouth daily. Qty: 30 Tablet, Refills: 0      cholecalciferol (VITAMIN D3) (2,000 UNITS /50 MCG) cap capsule Take 1 Capsule by mouth daily. Qty: 30 Capsule, Refills: 0      mometasone-formoterol (DULERA) 200-5 mcg/actuation HFA inhaler Take 2 Puffs by inhalation two (2) times a day. Qty: 1 Each, Refills: 0      montelukast (SINGULAIR) 10 mg tablet Take 1 Tablet by mouth nightly. Qty: 30 Tablet, Refills: 0      zinc sulfate (ZINCATE) 50 mg zinc (220 mg) capsule Take 1 Capsule by mouth daily. Qty: 30 Capsule, Refills: 0           2. Follow-up Information    None       3. Return to ED if worse   4. Current Discharge Medication List            Diagnosis     Clinical Impression:     ICD-10-CM ICD-9-CM    1. Urinary tract infection without hematuria, site unspecified  N39.0 599.0    2. Dizziness  R42 780.4    3. Alcohol use disorder, moderate, dependence (HCC)  F10.20 303.90    4.  Facial laceration, initial encounter  S01.81XA 873.40     behind left ear     Attestations:    Crystal Briones MD    Please note that this dictation was completed with Dragon, the computer voice recognition software. Quite often unanticipated grammatical, syntax, homophones, and other interpretive errors are inadvertently transcribed by the computer software. Please disregard these errors. Please excuse any errors that have escaped final proofreading. Thank you.

## 2022-03-11 NOTE — ED TRIAGE NOTES
Pt brought  in by EMS who reported pt was helping move some boxes when he got dizzy and passed out hitting the left side of his head on a bucket pt noted to have small laceration behind left ear, pt now denies any pain or dizziness

## 2022-03-11 NOTE — DISCHARGE INSTRUCTIONS
Take medication as directed. Avoid alcohol. No velásquez movement or excessive bending. Follow-up with primary care doctor on Monday. Recommendation is AA groups.

## 2022-03-11 NOTE — Clinical Note
[address] [facility] EMERGENCY DEPT  [place] [phone]    Work/School Note    Date: 3/11/2022    To Whom It May concern:    Stephanie Burt was seen and treated today in the emergency room by the following provider(s):  Attending Provider: Eufemia Navarro MD.      Stephanie Burt is excused from work/school on 3/11/2022 through 3/13/2022. He is medically clear to return to work/school on 3/14/2022.          Sincerely,          Susanna Cousins

## 2022-03-14 LAB
ATRIAL RATE: 96 BPM
CALCULATED P AXIS, ECG09: 71 DEGREES
CALCULATED R AXIS, ECG10: 64 DEGREES
CALCULATED T AXIS, ECG11: -28 DEGREES
DIAGNOSIS, 93000: NORMAL
P-R INTERVAL, ECG05: 145 MS
Q-T INTERVAL, ECG07: 344 MS
QRS DURATION, ECG06: 74 MS
QTC CALCULATION (BEZET), ECG08: 435 MS
VENTRICULAR RATE, ECG03: 96 BPM

## 2022-03-19 PROBLEM — U07.1 PNEUMONIA DUE TO COVID-19 VIRUS: Status: ACTIVE | Noted: 2021-10-07

## 2022-03-19 PROBLEM — J12.82 PNEUMONIA DUE TO COVID-19 VIRUS: Status: ACTIVE | Noted: 2021-10-07

## 2022-03-19 PROBLEM — R09.02 HYPOXIA: Status: ACTIVE | Noted: 2021-10-07

## 2022-03-20 PROBLEM — R79.89 POSITIVE D DIMER: Status: ACTIVE | Noted: 2021-10-07

## 2022-03-31 ENCOUNTER — HOSPITAL ENCOUNTER (INPATIENT)
Age: 42
LOS: 5 days | Discharge: HOME | End: 2022-04-05
Attending: EMERGENCY MEDICINE | Admitting: HOSPITALIST
Payer: MEDICAID

## 2022-03-31 ENCOUNTER — APPOINTMENT (OUTPATIENT)
Dept: CT IMAGING | Age: 42
End: 2022-03-31
Attending: EMERGENCY MEDICINE
Payer: MEDICAID

## 2022-03-31 ENCOUNTER — HOSPITAL ENCOUNTER (EMERGENCY)
Age: 42
Discharge: ANOTHER HOSPITAL | End: 2022-03-31
Attending: EMERGENCY MEDICINE
Payer: MEDICAID

## 2022-03-31 VITALS
TEMPERATURE: 97.7 F | RESPIRATION RATE: 18 BRPM | HEIGHT: 70 IN | HEART RATE: 81 BPM | BODY MASS INDEX: 20.76 KG/M2 | SYSTOLIC BLOOD PRESSURE: 132 MMHG | DIASTOLIC BLOOD PRESSURE: 78 MMHG | OXYGEN SATURATION: 98 % | WEIGHT: 145 LBS

## 2022-03-31 DIAGNOSIS — F10.930 ALCOHOL WITHDRAWAL SEIZURE WITHOUT COMPLICATION (HCC): ICD-10-CM

## 2022-03-31 DIAGNOSIS — R56.9 ALCOHOL WITHDRAWAL SEIZURE WITHOUT COMPLICATION (HCC): ICD-10-CM

## 2022-03-31 DIAGNOSIS — E87.20 LACTIC ACIDOSIS: ICD-10-CM

## 2022-03-31 DIAGNOSIS — R56.9 SEIZURE (HCC): Primary | ICD-10-CM

## 2022-03-31 DIAGNOSIS — K81.9 CHOLECYSTITIS: Primary | ICD-10-CM

## 2022-03-31 PROBLEM — E80.6 HYPERBILIRUBINEMIA: Status: ACTIVE | Noted: 2022-03-31

## 2022-03-31 PROBLEM — B17.9 HEPATITIS, ACUTE: Status: ACTIVE | Noted: 2022-03-31

## 2022-03-31 LAB
ALBUMIN SERPL-MCNC: 2.2 G/DL (ref 3.5–5)
ALBUMIN/GLOB SERPL: 0.5 {RATIO} (ref 1.1–2.2)
ALP SERPL-CCNC: 871 U/L (ref 45–117)
ALT SERPL-CCNC: 75 U/L (ref 12–78)
AMMONIA PLAS-SCNC: <10 UMOL/L
ANION GAP SERPL CALC-SCNC: 18 MMOL/L (ref 5–15)
AST SERPL W P-5'-P-CCNC: 251 U/L (ref 15–37)
BASOPHILS # BLD: 0.1 K/UL (ref 0–0.2)
BASOPHILS NFR BLD: 1 % (ref 0–2.5)
BILIRUB DIRECT SERPL-MCNC: 12.7 MG/DL (ref 0–0.2)
BILIRUB SERPL-MCNC: 18 MG/DL (ref 0.2–1)
BUN SERPL-MCNC: 8 MG/DL (ref 6–20)
BUN/CREAT SERPL: 7 (ref 12–20)
CA-I BLD-MCNC: 7.9 MG/DL (ref 8.5–10.1)
CHLORIDE SERPL-SCNC: 92 MMOL/L (ref 97–108)
CO2 SERPL-SCNC: 21 MMOL/L (ref 21–32)
CREAT SERPL-MCNC: 1.15 MG/DL (ref 0.7–1.3)
CRP SERPL-MCNC: 2.2 MG/DL (ref 0–0.6)
EOSINOPHIL # BLD: 0.1 K/UL (ref 0–0.7)
EOSINOPHIL NFR BLD: 1 % (ref 0.9–2.9)
ERYTHROCYTE [DISTWIDTH] IN BLOOD BY AUTOMATED COUNT: 22.1 % (ref 11.5–14.5)
ETHANOL SERPL-MCNC: <4 MG/DL
GLOBULIN SER CALC-MCNC: 4.3 G/DL (ref 2–4)
GLUCOSE SERPL-MCNC: 147 MG/DL (ref 65–100)
HCT VFR BLD AUTO: 31.2 % (ref 41–53)
HGB BLD-MCNC: 10.4 G/DL (ref 13.5–17.5)
INR PPP: 1 (ref 0.9–1.1)
KETONES SERPL QL: NEGATIVE
LACTATE SERPL-SCNC: 0.5 MMOL/L (ref 0.4–2)
LACTATE SERPL-SCNC: 12.7 MMOL/L (ref 0.4–2)
LDH SERPL L TO P-CCNC: 328 U/L (ref 85–241)
LIPASE SERPL-CCNC: 89 U/L (ref 73–393)
LYMPHOCYTES # BLD: 3.1 K/UL (ref 1–4.8)
LYMPHOCYTES NFR BLD: 47 % (ref 20.5–51.1)
MAGNESIUM SERPL-MCNC: 1.4 MG/DL (ref 1.6–2.4)
MCH RBC QN AUTO: 35.3 PG (ref 31–34)
MCHC RBC AUTO-ENTMCNC: 33.3 G/DL (ref 31–36)
MCV RBC AUTO: 106.1 FL (ref 80–100)
MONOCYTES # BLD: 0.3 K/UL (ref 0.2–2.4)
MONOCYTES NFR BLD: 5 % (ref 1.7–9.3)
NEUTS SEG # BLD: 3 K/UL (ref 1.8–7.7)
NEUTS SEG NFR BLD: 46 % (ref 42–75)
NRBC # BLD: 0.05 K/UL
NRBC BLD-RTO: 0.7 PER 100 WBC
PLATELET # BLD AUTO: 125 K/UL (ref 150–400)
PMV BLD AUTO: 9.8 FL (ref 6.5–11.5)
POTASSIUM SERPL-SCNC: 4.6 MMOL/L (ref 3.5–5.1)
PROT SERPL-MCNC: 6.5 G/DL (ref 6.4–8.2)
PROTHROMBIN TIME: 12.8 SEC (ref 11.9–14.6)
RBC # BLD AUTO: 2.94 M/UL (ref 4.5–5.9)
SODIUM SERPL-SCNC: 131 MMOL/L (ref 136–145)
TSH SERPL DL<=0.05 MIU/L-ACNC: 1.26 UIU/ML (ref 0.36–3.74)
URATE SERPL-MCNC: 6.5 MG/DL (ref 3.5–7.2)
WBC # BLD AUTO: 6.6 K/UL (ref 4.4–11.3)

## 2022-03-31 PROCEDURE — 86140 C-REACTIVE PROTEIN: CPT

## 2022-03-31 PROCEDURE — 84443 ASSAY THYROID STIM HORMONE: CPT

## 2022-03-31 PROCEDURE — 83615 LACTATE (LD) (LDH) ENZYME: CPT

## 2022-03-31 PROCEDURE — 93005 ELECTROCARDIOGRAM TRACING: CPT

## 2022-03-31 PROCEDURE — 82140 ASSAY OF AMMONIA: CPT

## 2022-03-31 PROCEDURE — 83735 ASSAY OF MAGNESIUM: CPT

## 2022-03-31 PROCEDURE — 83605 ASSAY OF LACTIC ACID: CPT

## 2022-03-31 PROCEDURE — 82248 BILIRUBIN DIRECT: CPT

## 2022-03-31 PROCEDURE — 74011250636 HC RX REV CODE- 250/636: Performed by: EMERGENCY MEDICINE

## 2022-03-31 PROCEDURE — 74011000250 HC RX REV CODE- 250: Performed by: HOSPITALIST

## 2022-03-31 PROCEDURE — 36415 COLL VENOUS BLD VENIPUNCTURE: CPT

## 2022-03-31 PROCEDURE — 96375 TX/PRO/DX INJ NEW DRUG ADDON: CPT

## 2022-03-31 PROCEDURE — 99285 EMERGENCY DEPT VISIT HI MDM: CPT

## 2022-03-31 PROCEDURE — 74011000258 HC RX REV CODE- 258: Performed by: EMERGENCY MEDICINE

## 2022-03-31 PROCEDURE — 83690 ASSAY OF LIPASE: CPT

## 2022-03-31 PROCEDURE — 96365 THER/PROPH/DIAG IV INF INIT: CPT

## 2022-03-31 PROCEDURE — 85025 COMPLETE CBC W/AUTO DIFF WBC: CPT

## 2022-03-31 PROCEDURE — 65270000029 HC RM PRIVATE

## 2022-03-31 PROCEDURE — 74011000636 HC RX REV CODE- 636: Performed by: EMERGENCY MEDICINE

## 2022-03-31 PROCEDURE — 96361 HYDRATE IV INFUSION ADD-ON: CPT

## 2022-03-31 PROCEDURE — 82077 ASSAY SPEC XCP UR&BREATH IA: CPT

## 2022-03-31 PROCEDURE — 82009 KETONE BODYS QUAL: CPT

## 2022-03-31 PROCEDURE — 74011250636 HC RX REV CODE- 250/636: Performed by: HOSPITALIST

## 2022-03-31 PROCEDURE — 85610 PROTHROMBIN TIME: CPT

## 2022-03-31 PROCEDURE — 80053 COMPREHEN METABOLIC PANEL: CPT

## 2022-03-31 PROCEDURE — 74170 CT ABD WO CNTRST FLWD CNTRST: CPT

## 2022-03-31 PROCEDURE — 96374 THER/PROPH/DIAG INJ IV PUSH: CPT

## 2022-03-31 PROCEDURE — 84550 ASSAY OF BLOOD/URIC ACID: CPT

## 2022-03-31 RX ORDER — MAGNESIUM SULFATE HEPTAHYDRATE 40 MG/ML
2 INJECTION, SOLUTION INTRAVENOUS
Status: DISCONTINUED | OUTPATIENT
Start: 2022-03-31 | End: 2022-03-31 | Stop reason: HOSPADM

## 2022-03-31 RX ORDER — ONDANSETRON 2 MG/ML
4 INJECTION INTRAMUSCULAR; INTRAVENOUS
Status: DISCONTINUED | OUTPATIENT
Start: 2022-03-31 | End: 2022-04-05 | Stop reason: HOSPADM

## 2022-03-31 RX ORDER — SODIUM CHLORIDE 9 MG/ML
125 INJECTION, SOLUTION INTRAVENOUS CONTINUOUS
Status: DISPENSED | OUTPATIENT
Start: 2022-03-31 | End: 2022-04-01

## 2022-03-31 RX ORDER — SODIUM CHLORIDE 0.9 % (FLUSH) 0.9 %
5-40 SYRINGE (ML) INJECTION EVERY 8 HOURS
Status: DISCONTINUED | OUTPATIENT
Start: 2022-03-31 | End: 2022-04-05 | Stop reason: HOSPADM

## 2022-03-31 RX ORDER — DEXAMETHASONE 4 MG/1
6 TABLET ORAL DAILY
Status: DISCONTINUED | OUTPATIENT
Start: 2022-03-31 | End: 2022-04-01

## 2022-03-31 RX ORDER — ONDANSETRON 4 MG/1
4 TABLET, ORALLY DISINTEGRATING ORAL
Status: DISCONTINUED | OUTPATIENT
Start: 2022-03-31 | End: 2022-04-05 | Stop reason: HOSPADM

## 2022-03-31 RX ORDER — SODIUM CHLORIDE 0.9 % (FLUSH) 0.9 %
5-40 SYRINGE (ML) INJECTION AS NEEDED
Status: DISCONTINUED | OUTPATIENT
Start: 2022-03-31 | End: 2022-04-05 | Stop reason: HOSPADM

## 2022-03-31 RX ADMIN — SODIUM CHLORIDE 1000 ML: 9 INJECTION, SOLUTION INTRAVENOUS at 17:11

## 2022-03-31 RX ADMIN — DEXAMETHASONE 6 MG: 4 TABLET ORAL at 20:44

## 2022-03-31 RX ADMIN — MAGNESIUM SULFATE HEPTAHYDRATE 2 G: 2 INJECTION, SOLUTION INTRAVENOUS at 17:12

## 2022-03-31 RX ADMIN — SODIUM CHLORIDE, PRESERVATIVE FREE 10 ML: 5 INJECTION INTRAVENOUS at 20:44

## 2022-03-31 RX ADMIN — LEVETIRACETAM 1500 MG: 100 INJECTION, SOLUTION INTRAVENOUS at 14:05

## 2022-03-31 RX ADMIN — IOPAMIDOL 100 ML: 755 INJECTION, SOLUTION INTRAVENOUS at 16:05

## 2022-03-31 RX ADMIN — SODIUM CHLORIDE 125 ML/HR: 9 INJECTION, SOLUTION INTRAVENOUS at 20:44

## 2022-03-31 RX ADMIN — SODIUM CHLORIDE 1000 ML: 9 INJECTION, SOLUTION INTRAVENOUS at 14:05

## 2022-03-31 NOTE — ED TRIAGE NOTES
EMS reports that family member says patient was getting ready to walk to the store and fell to the floor and then had seizure like activities. EMS states patient has hx of seizures but has not been put on any medications for them. Patient presents with yellowing eyes and denies heavy alcohol use. Patient is A&Ox4. Patient denies hitting his head when he fell but states he did lose consciousness.

## 2022-03-31 NOTE — ED TRIAGE NOTES
Patient arrived from [facility] for seizure like activity patient has hx of seizures but not on medications

## 2022-03-31 NOTE — ED PROVIDER NOTES
EMERGENCY DEPARTMENT HISTORY AND PHYSICAL EXAM      Date: 3/31/2022  Patient Name: Nikki Meraz. History of Presenting Illness     Chief Complaint   Patient presents with    Seizure       History Provided By: Patient    HPI: Nikki Meraz., 42 y.o. male with a past medical history significant seizure and Diagnosed 3 years ago presents to the ED with cc of witnessed seizure activity by family member, patient denies headache states he is not on any seizure medications his last episode was about a year ago presents hypotensive previous history of syncope, patient denies history of HIV infection denies any history of liver disease denies history of hepatitis denies any history of excessive drinking does not take Tylenol regularly    There are no other complaints, changes, or physical findings at this time. PCP: None    Current Outpatient Medications   Medication Sig Dispense Refill    amoxicillin 500 mg tab Take 500 mg by mouth three (3) times daily for 10 days. 30 Tablet 0    albuterol (PROVENTIL HFA, VENTOLIN HFA, PROAIR HFA) 90 mcg/actuation inhaler Take 2 Puffs by inhalation every four (4) hours as needed for Wheezing or Shortness of Breath. 18 g 0    ascorbic acid, vitamin C, (VITAMIN C) 500 mg tablet Take 1 Tablet by mouth two (2) times a day. 60 Tablet 0    azithromycin (ZITHROMAX) 250 mg tablet Take 2 Tablets by mouth daily. 6 Tablet 0    cetirizine (ZYRTEC) 10 mg tablet Take 1 Tablet by mouth daily. 30 Tablet 0    cholecalciferol (VITAMIN D3) (2,000 UNITS /50 MCG) cap capsule Take 1 Capsule by mouth daily. 30 Capsule 0    mometasone-formoterol (DULERA) 200-5 mcg/actuation HFA inhaler Take 2 Puffs by inhalation two (2) times a day. 1 Each 0    montelukast (SINGULAIR) 10 mg tablet Take 1 Tablet by mouth nightly. 30 Tablet 0    zinc sulfate (ZINCATE) 50 mg zinc (220 mg) capsule Take 1 Capsule by mouth daily.  30 Capsule 0       Past History     Past Medical History:  Past Medical History: Diagnosis Date    Seizure [facility])        Past Surgical History:  History reviewed. No pertinent surgical history. Family History:  History reviewed. No pertinent family history. Social History:  Social History     Tobacco Use    Smoking status: Current Every Day Smoker     Packs/day: 0.25    Smokeless tobacco: Never Used   Vaping Use    Vaping Use: Never used   Substance Use Topics    Alcohol use: Yes     Comment: occasionally     Drug use: Never       Allergies:  No Known Allergies      Review of Systems     Review of Systems   Constitutional: Negative for chills and fever. HENT: Negative for rhinorrhea and sore throat. Eyes: Negative for pain and visual disturbance. Respiratory: Negative for cough and shortness of breath. Cardiovascular: Negative for chest pain and leg swelling. Gastrointestinal: Negative for abdominal pain and vomiting. Endocrine: Negative for polydipsia and polyuria. Genitourinary: Negative for dysuria and hematuria. Musculoskeletal: Negative for back pain and neck pain. Skin: Negative for color change and pallor. Neurological: Positive for seizures and syncope. Negative for weakness and headaches. Psychiatric/Behavioral: Negative for agitation and suicidal ideas. Physical Exam     Physical Exam  Vitals and nursing note reviewed. Constitutional:       General: He is not in acute distress. Appearance: He is not ill-appearing, toxic-appearing or diaphoretic. HENT:      Head: Normocephalic and atraumatic. Right Ear: Tympanic membrane normal.      Left Ear: Tympanic membrane normal.      Nose: Nose normal. No congestion. Mouth/Throat:      Mouth: Mucous membranes are moist.      Pharynx: Oropharynx is clear. Eyes:      General: Lids are normal. Scleral icterus present. Extraocular Movements: Extraocular movements intact. Conjunctiva/sclera: Conjunctivae normal.      Pupils: Pupils are equal, round, and reactive to light. Cardiovascular:      Rate and Rhythm: Normal rate and regular rhythm. Pulses: Normal pulses. Heart sounds: Normal heart sounds. Pulmonary:      Effort: Pulmonary effort is normal.      Breath sounds: Normal breath sounds. Abdominal:      General: Bowel sounds are normal.      Palpations: Abdomen is soft. Tenderness: There is no abdominal tenderness. Musculoskeletal:         General: No tenderness, deformity or signs of injury. Normal range of motion. Cervical back: Normal range of motion and neck supple. No rigidity or tenderness. Lymphadenopathy:      Cervical: No cervical adenopathy. Skin:     General: Skin is warm and dry. Capillary Refill: Capillary refill takes less than 2 seconds. Coloration: Skin is jaundiced. Findings: No rash. Neurological:      General: No focal deficit present. Mental Status: He is alert and oriented to person, place, and time. Cranial Nerves: No cranial nerve deficit. Sensory: No sensory deficit. Psychiatric:         Mood and Affect: Mood normal.         Behavior: Behavior normal.         Lab and Diagnostic Study Results     Labs -   No results found for this or any previous visit (from the past 12 hour(s)). Radiologic Studies -   [unfilled]  CT Results  (Last 48 hours)    None        CXR Results  (Last 48 hours)    None          Medical Decision Making and ED Course   - I am the first and primary provider for this patient AND AM THE PRIMARY PROVIDER OF RECORD. - I reviewed the vital signs, available nursing notes, past medical history, past surgical history, family history and social history. - Initial assessment performed. The patients presenting problems have been discussed, and the staff are in agreement with the care plan formulated and outlined with them. I have encouraged them to ask questions as they arise throughout their visit. Vital Signs-Reviewed the patient's vital signs.     Patient Vitals for the past 12 hrs:   Temp Pulse Resp BP SpO2   03/31/22 1332    (!) 103/90    03/31/22 1325 97.7 °F (36.5 °C) (!) 117 8 (!) 162/103 99 %       Records Reviewed: Nursing Notes    The patient presents with jaundice with a differential diagnosis of liver failure, cholecystitis, pancreatitis    ED Course:       ED Course as of 03/31/22 1744   Thu Mar 31, 2022   1535 Alk. phosphatase(!): 871 [SB]   1536 Bilirubin, total(!): 18.0 [SB]   1536 AST(!): 251 [SB]   1536 Anion gap(!): 18  Hospitalist consulted [SB]   46 Patient's wife states he consumes alcohol daily, patient states he last consumed alcohol 2 days ago reason why he did not have any alcohol yesterday was because he did not have any money [SB]   1545 Lactic acid(!!): 12.7 [SB]      ED Course User Index  [SB] Luana Painter MD         Provider Notes (Medical Decision Making): MDM           Consultations:       Consultations: - NONE        Procedures and Critical Care       Performed by: Cornelius Blum MD  PROCEDURES:  Procedures         ALCOHOL/SUBSTANCE ABUSE COUNSELING: Upon evaluation, pt endorsed recent alcohol/illicit drug use. For approximately 15 minutes, pt has been counseled on the dangers of alcohol and illicit drug use on their health, and they were encouraged to quit as soon as possible in order to decrease further risks to their health. Pt has conveyed their understanding of the risks involved should they continue to use these products. Cornelius Blum MD        Disposition     Disposition: Condition stable and improved  Transferred to [facility] patient verbally agreed to transfer and understand the risks involved as outlined in the EMTALA form. Remove if not discharged  DISCHARGE PLAN:  1. Current Discharge Medication List      CONTINUE these medications which have NOT CHANGED    Details   amoxicillin 500 mg tab Take 500 mg by mouth three (3) times daily for 10 days.   Qty: 30 Tablet, Refills: 0      albuterol (PROVENTIL HFA, VENTOLIN HFA, PROAIR HFA) 90 mcg/actuation inhaler Take 2 Puffs by inhalation every four (4) hours as needed for Wheezing or Shortness of Breath. Qty: 18 g, Refills: 0      ascorbic acid, vitamin C, (VITAMIN C) 500 mg tablet Take 1 Tablet by mouth two (2) times a day. Qty: 60 Tablet, Refills: 0      azithromycin (ZITHROMAX) 250 mg tablet Take 2 Tablets by mouth daily. Qty: 6 Tablet, Refills: 0      cetirizine (ZYRTEC) 10 mg tablet Take 1 Tablet by mouth daily. Qty: 30 Tablet, Refills: 0      cholecalciferol (VITAMIN D3) (2,000 UNITS /50 MCG) cap capsule Take 1 Capsule by mouth daily. Qty: 30 Capsule, Refills: 0      mometasone-formoterol (DULERA) 200-5 mcg/actuation HFA inhaler Take 2 Puffs by inhalation two (2) times a day. Qty: 1 Each, Refills: 0      montelukast (SINGULAIR) 10 mg tablet Take 1 Tablet by mouth nightly. Qty: 30 Tablet, Refills: 0      zinc sulfate (ZINCATE) 50 mg zinc (220 mg) capsule Take 1 Capsule by mouth daily. Qty: 30 Capsule, Refills: 0           2. Follow-up Information    None       3. Return to ED if worse   4. Current Discharge Medication List          Diagnosis     Clinical Impression: No diagnosis found. Attestations:    Aida Murcia MD    Please note that this dictation was completed with [employer], the [facility] voice recognition software. Quite often unanticipated grammatical, syntax, homophones, and other interpretive errors are inadvertently transcribed by the computer software. Please disregard these errors. Please excuse any errors that have escaped final proofreading. Thank you.

## 2022-04-01 ENCOUNTER — APPOINTMENT (OUTPATIENT)
Dept: ULTRASOUND IMAGING | Age: 42
End: 2022-04-01
Attending: INTERNAL MEDICINE
Payer: MEDICAID

## 2022-04-01 LAB
ALBUMIN SERPL-MCNC: 2.2 G/DL (ref 3.5–5)
ALBUMIN/GLOB SERPL: 0.7 {RATIO} (ref 1.1–2.2)
ALP SERPL-CCNC: 726 U/L (ref 45–117)
ALT SERPL-CCNC: 62 U/L (ref 12–78)
ANION GAP SERPL CALC-SCNC: 11 MMOL/L (ref 5–15)
AST SERPL W P-5'-P-CCNC: 222 U/L (ref 15–37)
ATRIAL RATE: 93 BPM
BASOPHILS # BLD: 0 K/UL (ref 0–0.1)
BASOPHILS NFR BLD: 0 % (ref 0–1)
BILIRUB SERPL-MCNC: 17.7 MG/DL (ref 0.2–1)
BUN SERPL-MCNC: 3 MG/DL (ref 6–20)
BUN/CREAT SERPL: 3 (ref 12–20)
CA-I BLD-MCNC: 7.7 MG/DL (ref 8.5–10.1)
CALCULATED P AXIS, ECG09: 74 DEGREES
CALCULATED R AXIS, ECG10: 60 DEGREES
CALCULATED T AXIS, ECG11: 65 DEGREES
CHLORIDE SERPL-SCNC: 98 MMOL/L (ref 97–108)
CO2 SERPL-SCNC: 24 MMOL/L (ref 21–32)
CREAT SERPL-MCNC: 1.08 MG/DL (ref 0.7–1.3)
DIAGNOSIS, 93000: NORMAL
DIFFERENTIAL METHOD BLD: ABNORMAL
EOSINOPHIL # BLD: 0 K/UL (ref 0–0.4)
EOSINOPHIL NFR BLD: 0 % (ref 0–7)
ERYTHROCYTE [DISTWIDTH] IN BLOOD BY AUTOMATED COUNT: 19.5 % (ref 11.5–14.5)
ERYTHROCYTE [SEDIMENTATION RATE] IN BLOOD: 63 MM/HR (ref 0–15)
GLOBULIN SER CALC-MCNC: 3.3 G/DL (ref 2–4)
GLUCOSE SERPL-MCNC: 185 MG/DL (ref 65–100)
HCT VFR BLD AUTO: 24.5 % (ref 36.6–50.3)
HGB BLD-MCNC: 8.4 G/DL (ref 12.1–17)
IMM GRANULOCYTES # BLD AUTO: 0 K/UL
IMM GRANULOCYTES NFR BLD AUTO: 0 %
LYMPHOCYTES # BLD: 0.6 K/UL (ref 0.8–3.5)
LYMPHOCYTES NFR BLD: 11 % (ref 12–49)
MCH RBC QN AUTO: 34.6 PG (ref 26–34)
MCHC RBC AUTO-ENTMCNC: 34.3 G/DL (ref 30–36.5)
MCV RBC AUTO: 100.8 FL (ref 80–99)
MONOCYTES # BLD: 0.1 K/UL (ref 0–1)
MONOCYTES NFR BLD: 1 % (ref 5–13)
NEUTS SEG # BLD: 4.5 K/UL (ref 1.8–8)
NEUTS SEG NFR BLD: 88 % (ref 32–75)
NRBC # BLD: 0.02 K/UL (ref 0–0.01)
NRBC BLD-RTO: 0.4 PER 100 WBC
P-R INTERVAL, ECG05: 104 MS
PLATELET # BLD AUTO: 101 K/UL (ref 150–400)
PMV BLD AUTO: 12 FL (ref 8.9–12.9)
POTASSIUM SERPL-SCNC: 2.7 MMOL/L (ref 3.5–5.1)
PROT SERPL-MCNC: 5.5 G/DL (ref 6.4–8.2)
Q-T INTERVAL, ECG07: 473 MS
QRS DURATION, ECG06: 272 MS
QTC CALCULATION (BEZET), ECG08: 589 MS
RBC # BLD AUTO: 2.43 M/UL (ref 4.1–5.7)
RBC MORPH BLD: ABNORMAL
RBC MORPH BLD: ABNORMAL
SODIUM SERPL-SCNC: 133 MMOL/L (ref 136–145)
VENTRICULAR RATE, ECG03: 93 BPM
WBC # BLD AUTO: 5.2 K/UL (ref 4.1–11.1)

## 2022-04-01 PROCEDURE — 76705 ECHO EXAM OF ABDOMEN: CPT

## 2022-04-01 PROCEDURE — 65610000006 HC RM INTENSIVE CARE

## 2022-04-01 PROCEDURE — 74011000250 HC RX REV CODE- 250: Performed by: INTERNAL MEDICINE

## 2022-04-01 PROCEDURE — 74011250636 HC RX REV CODE- 250/636: Performed by: INTERNAL MEDICINE

## 2022-04-01 PROCEDURE — 82607 VITAMIN B-12: CPT

## 2022-04-01 PROCEDURE — 65270000029 HC RM PRIVATE

## 2022-04-01 PROCEDURE — 85025 COMPLETE CBC W/AUTO DIFF WBC: CPT

## 2022-04-01 PROCEDURE — 80074 ACUTE HEPATITIS PANEL: CPT

## 2022-04-01 PROCEDURE — 80053 COMPREHEN METABOLIC PANEL: CPT

## 2022-04-01 PROCEDURE — 82306 VITAMIN D 25 HYDROXY: CPT

## 2022-04-01 PROCEDURE — 82105 ALPHA-FETOPROTEIN SERUM: CPT

## 2022-04-01 PROCEDURE — 74011250637 HC RX REV CODE- 250/637: Performed by: INTERNAL MEDICINE

## 2022-04-01 PROCEDURE — 83540 ASSAY OF IRON: CPT

## 2022-04-01 PROCEDURE — 74011250636 HC RX REV CODE- 250/636: Performed by: HOSPITALIST

## 2022-04-01 PROCEDURE — 74011000258 HC RX REV CODE- 258: Performed by: INTERNAL MEDICINE

## 2022-04-01 PROCEDURE — 82728 ASSAY OF FERRITIN: CPT

## 2022-04-01 PROCEDURE — 74011000250 HC RX REV CODE- 250: Performed by: HOSPITALIST

## 2022-04-01 PROCEDURE — 87389 HIV-1 AG W/HIV-1&-2 AB AG IA: CPT

## 2022-04-01 PROCEDURE — 86038 ANTINUCLEAR ANTIBODIES: CPT

## 2022-04-01 PROCEDURE — 85652 RBC SED RATE AUTOMATED: CPT

## 2022-04-01 PROCEDURE — 36415 COLL VENOUS BLD VENIPUNCTURE: CPT

## 2022-04-01 RX ORDER — DEXTROSE MONOHYDRATE AND SODIUM CHLORIDE 5; .9 G/100ML; G/100ML
75 INJECTION, SOLUTION INTRAVENOUS CONTINUOUS
Status: DISCONTINUED | OUTPATIENT
Start: 2022-04-01 | End: 2022-04-05 | Stop reason: HOSPADM

## 2022-04-01 RX ORDER — POTASSIUM CHLORIDE 750 MG/1
10 TABLET, FILM COATED, EXTENDED RELEASE ORAL
Status: DISCONTINUED | OUTPATIENT
Start: 2022-04-01 | End: 2022-04-01

## 2022-04-01 RX ORDER — LORAZEPAM 2 MG/ML
2 INJECTION INTRAMUSCULAR
Status: DISPENSED | OUTPATIENT
Start: 2022-04-01 | End: 2022-04-01

## 2022-04-01 RX ORDER — LORAZEPAM 2 MG/ML
4 INJECTION INTRAMUSCULAR
Status: DISCONTINUED | OUTPATIENT
Start: 2022-04-01 | End: 2022-04-05 | Stop reason: HOSPADM

## 2022-04-01 RX ORDER — POTASSIUM CHLORIDE 20 MEQ/1
40 TABLET, EXTENDED RELEASE ORAL
Status: COMPLETED | OUTPATIENT
Start: 2022-04-01 | End: 2022-04-01

## 2022-04-01 RX ORDER — FOLIC ACID 1 MG/1
1 TABLET ORAL DAILY
Status: DISCONTINUED | OUTPATIENT
Start: 2022-04-02 | End: 2022-04-05 | Stop reason: HOSPADM

## 2022-04-01 RX ORDER — POTASSIUM CHLORIDE 7.45 MG/ML
10 INJECTION INTRAVENOUS
Status: DISCONTINUED | OUTPATIENT
Start: 2022-04-01 | End: 2022-04-01

## 2022-04-01 RX ORDER — POTASSIUM CHLORIDE 750 MG/1
40 TABLET, FILM COATED, EXTENDED RELEASE ORAL
Status: COMPLETED | OUTPATIENT
Start: 2022-04-01 | End: 2022-04-01

## 2022-04-01 RX ORDER — DEXMEDETOMIDINE HYDROCHLORIDE 4 UG/ML
.1-1.5 INJECTION, SOLUTION INTRAVENOUS
Status: DISCONTINUED | OUTPATIENT
Start: 2022-04-01 | End: 2022-04-05 | Stop reason: HOSPADM

## 2022-04-01 RX ORDER — ASPIRIN 325 MG/1
100 TABLET, FILM COATED ORAL DAILY
Status: DISCONTINUED | OUTPATIENT
Start: 2022-04-02 | End: 2022-04-05 | Stop reason: HOSPADM

## 2022-04-01 RX ORDER — LORAZEPAM 2 MG/ML
2 INJECTION INTRAMUSCULAR
Status: DISCONTINUED | OUTPATIENT
Start: 2022-04-01 | End: 2022-04-05 | Stop reason: HOSPADM

## 2022-04-01 RX ADMIN — POTASSIUM CHLORIDE 40 MEQ: 750 TABLET, FILM COATED, EXTENDED RELEASE ORAL at 15:36

## 2022-04-01 RX ADMIN — POTASSIUM CHLORIDE 40 MEQ: 20 TABLET, EXTENDED RELEASE ORAL at 13:18

## 2022-04-01 RX ADMIN — LORAZEPAM 2 MG: 2 INJECTION INTRAMUSCULAR at 15:50

## 2022-04-01 RX ADMIN — ZIPRASIDONE MESYLATE 10 MG: 20 INJECTION, POWDER, LYOPHILIZED, FOR SOLUTION INTRAMUSCULAR at 15:36

## 2022-04-01 RX ADMIN — SODIUM CHLORIDE 125 ML/HR: 9 INJECTION, SOLUTION INTRAVENOUS at 06:13

## 2022-04-01 RX ADMIN — POTASSIUM CHLORIDE 10 MEQ: 7.46 INJECTION, SOLUTION INTRAVENOUS at 13:19

## 2022-04-01 RX ADMIN — SODIUM CHLORIDE, PRESERVATIVE FREE 10 ML: 5 INJECTION INTRAVENOUS at 22:00

## 2022-04-01 RX ADMIN — DEXTROSE AND SODIUM CHLORIDE 75 ML/HR: 5; 900 INJECTION, SOLUTION INTRAVENOUS at 15:52

## 2022-04-01 RX ADMIN — SODIUM CHLORIDE, PRESERVATIVE FREE 10 ML: 5 INJECTION INTRAVENOUS at 06:13

## 2022-04-01 RX ADMIN — LORAZEPAM 2 MG: 2 INJECTION INTRAMUSCULAR at 16:19

## 2022-04-01 RX ADMIN — DEXMEDETOMIDINE HYDROCHLORIDE 0.4 MCG/KG/HR: 4 INJECTION, SOLUTION INTRAVENOUS at 18:15

## 2022-04-01 RX ADMIN — SODIUM CHLORIDE, PRESERVATIVE FREE 10 ML: 5 INJECTION INTRAVENOUS at 13:25

## 2022-04-01 NOTE — PROGRESS NOTES
0900am: Paged Dr. Margarita Cerrato re:patient status patient was walking off unit and patient was stop by staff and stating he was going to smoke, educated patient that he was in the hospital and not able to leave his room. Shortly patient had walked on unit and was found on the 3rd floor knocking on the door for the screening for infants. Patient was brought back to room by staff. Ask MD for CIWA protocol and ativan order because of patient behavior and that we need a sitter in patient room.  in room noticed patient had pulled both IV out. New IV placed. 11:40am Sitter at bedside and called the nurse station that patient had pulled another IV out that he was given IV potassium. Patient stated arm was hurting and he remove the IV. Patient became very agitated and blood dripping all over the room. Nurse in room helping the patient and patient stating he's need to leave the [facility] called because patient left room again, notified MD that patient was leaving AMA and when asked patient where he was again stated [employer]. Awaiting MD for TDO for patient safety. Patient very confused, agitated and still asking to leave.

## 2022-04-01 NOTE — PROGRESS NOTES
Hospitalist Progress Note               Daily Progress Note: 4/1/2022      Subjective:   Per admitting physician    42 y.o. male with remote history of seizures 3 years ago one time presents to the referring facility emergency room after he developed a seizure-like activity with fall and altered mental status weakness by the family. He denies any fever, chills. Denies any nausea or vomiting. Little more than 2 weeks ago on March 11 presented to the emergency room complaining of feeling dizzy and lightheaded when he was trying to move boxes. At that time on laboratory data he has mild metabolic acidosis and bilirubin was elevated at 8. Thought that he was may be drinking alcohol. Today on the laboratory data he has significantly elevated bilirubin, and due to seizure-like activity  and needing a neurological evaluation transferred here for further management.     Patient states that he works 5 days a week, on 2 days that he is off he drink alcohol. He drinks 16 ounce beer 2 cans daily only those 2 days. Denies any drinking heavy alcohol intake recently. He denies any drug abuse.     October 2021 he had COVID-19 infection happened. Currently he is not on any medications.    -----      Patient seen and examined at bedside, resting comfortably watching tv and enjoying his breakfast. Denies alcohol use, except for having a can of beer or two on his days off from work. He does have a family history of alcohol use disorder from both sides of his family. At this time he has no complaints, denies headache, chest pain, palpitations, sob, abdominal pain, n/v/c/d or any other subjective symptoms. In January patient's bilirubin was normal although he did have an AST of 867,  at that time. On 3/11 was 8.1, alkaline phosphatase 708    Patient does admit to some weight loss.         Problem List:  Problem List as of 4/1/2022 Date Reviewed: 3/31/2022          Codes Class Noted - Resolved Hyperbilirubinemia ICD-10-CM: E80.6  ICD-9-CM: 782.4  3/31/2022 - Present        Seizure ([address] ICD-10-CM: R56.9  ICD-9-CM: 780.39  3/31/2022 - Present        Hepatitis, acute ICD-10-CM: B17.9  ICD-9-CM: 209  3/31/2022 - Present        Hypoxia ICD-10-CM: R09.02  ICD-9-CM: 799.02  10/7/2021 - Present        Positive D dimer ICD-10-CM: R79.89  ICD-9-CM: 790.92  10/7/2021 - Present        Pneumonia due to COVID-19 virus ICD-10-CM: U07.1, J12.82  ICD-9-CM: 480.8, 079.89  10/7/2021 - Present              Medications reviewed  Current Facility-Administered Medications   Medication Dose Route Frequency    sodium chloride (NS) flush 5-40 mL  5-40 mL IntraVENous Q8H    sodium chloride (NS) flush 5-40 mL  5-40 mL IntraVENous PRN    ondansetron (ZOFRAN ODT) tablet 4 mg  4 mg Oral Q6H PRN    Or    ondansetron (ZOFRAN) injection 4 mg  4 mg IntraVENous Q6H PRN    0.9% sodium chloride infusion  125 mL/hr IntraVENous CONTINUOUS       Review of Systems:   Not required    Objective:   Physical Exam:     Visit Vitals  /87 (BP 1 Location: Left upper arm, BP Patient Position: Sitting)   Pulse 94   Temp 98.5 °F (36.9 °C)   Resp 14   SpO2 97%      O2 Device: None (Room air)    Temp (24hrs), Av.2 °F (36.8 °C), Min:97.6 °F (36.4 °C), Max:99.1 °F (37.3 °C)    No intake/output data recorded. No intake/output data recorded. Physical Exam  Vitals and nursing note reviewed. Constitutional:       General: He is awake. Appearance: Normal appearance. He is normal weight. HENT:      Mouth/Throat:      Mouth: Mucous membranes are dry. Eyes:      General: Scleral icterus present. Cardiovascular:      Rate and Rhythm: Normal rate and regular rhythm. Pulses: Normal pulses. Pulmonary:      Effort: Pulmonary effort is normal.      Breath sounds: Normal breath sounds. Abdominal:      General: Abdomen is flat. Bowel sounds are normal. There is no distension. Palpations: Abdomen is soft. Tenderness:  There is no abdominal tenderness. There is no guarding or rebound. Skin:     General: Skin is warm and dry. Neurological:      General: No focal deficit present. Mental Status: He is alert and oriented to person, place, and time. Psychiatric:         Behavior: Behavior is cooperative. Data Review:       Recent Days:  Recent Labs     04/01/22  0621 03/31/22  1357   WBC 5.2 6.6   HGB 8.4* 10.4*   HCT 24.5* 31.2*   * 125*     Recent Labs     03/31/22  1357   *   K 4.6   CL 92*   CO2 21   *   BUN 8   CREA 1.15   CA 7.9*   MG 1.4*   ALB 2.2*   TBILI 18.0*   ALT 75   INR 1.0     No results for input(s): PH, PCO2, PO2, HCO3, FIO2 in the last 72 hours. 24 Hour Results:  Recent Results (from the past 24 hour(s))   EKG, 12 LEAD, INITIAL    Collection Time: 03/31/22  1:46 PM   Result Value Ref Range    Ventricular Rate 93 BPM    Atrial Rate 93 BPM    P-R Interval 104 ms    QRS Duration 272 ms    Q-T Interval 473 ms    QTC Calculation (Bezet) 589 ms    Calculated P Axis 74 degrees    Calculated R Axis 60 degrees    Calculated T Axis 65 degrees    Diagnosis       Sinus rhythm  Short PA interval  Nonspecific intraventricular conduction delay  Probable anteroseptal infarct, old     CBC WITH AUTOMATED DIFF    Collection Time: 03/31/22  1:57 PM   Result Value Ref Range    WBC 6.6 4.4 - 11.3 K/uL    RBC 2.94 (L) 4.50 - 5.90 M/uL    HGB 10.4 (L) 13.5 - 17.5 g/dL    HCT 31.2 (L) 41 - 53 %    .1 (H) 80 - 100 FL    MCH 35.3 (H) 31 - 34 PG    MCHC 33.3 31.0 - 36.0 g/dL    RDW 22.1 (H) 11.5 - 14.5 %    PLATELET 062 (L) 197 - 400 K/uL    MPV 9.8 6.5 - 11.5 FL    NRBC 0.7  WBC    ABSOLUTE NRBC 0.05 K/uL    NEUTROPHILS 46 42 - 75 %    LYMPHOCYTES 47 20.5 - 51.1 %    MONOCYTES 5 1.7 - 9.3 %    EOSINOPHILS 1 0.9 - 2.9 %    BASOPHILS 1 0.0 - 2.5 %    ABS. NEUTROPHILS 3.0 1.8 - 7.7 K/UL    ABS. LYMPHOCYTES 3.1 1.0 - 4.8 K/UL    ABS.  MONOCYTES 0.3 0.2 - 2.4 K/UL    ABS. EOSINOPHILS 0.1 0.0 - 0.7 K/UL    ABS. BASOPHILS 0.1 0.0 - 0.2 K/UL   PROTHROMBIN TIME + INR    Collection Time: 03/31/22  1:57 PM   Result Value Ref Range    Prothrombin time 12.8 11.9 - 14.6 sec    INR 1.0 0.9 - 1.1     METABOLIC PANEL, COMPREHENSIVE    Collection Time: 03/31/22  1:57 PM   Result Value Ref Range    Sodium 131 (L) 136 - 145 mmol/L    Potassium 4.6 3.5 - 5.1 mmol/L    Chloride 92 (L) 97 - 108 mmol/L    CO2 21 21 - 32 mmol/L    Anion gap 18 (H) 5 - 15 mmol/L    Glucose 147 (H) 65 - 100 mg/dL    BUN 8 6 - 20 mg/dL    Creatinine 1.15 0.70 - 1.30 mg/dL    BUN/Creatinine ratio 7 (L) 12 - 20      GFR est AA >60 >60 ml/min/1.73m2    GFR est non-AA >60 >60 ml/min/1.73m2    Calcium 7.9 (L) 8.5 - 10.1 mg/dL    Bilirubin, total 18.0 (H) 0.2 - 1.0 mg/dL    AST (SGOT) 251 (H) 15 - 37 U/L    ALT (SGPT) 75 12 - 78 U/L    Alk. phosphatase 871 (H) 45 - 117 U/L    Protein, total 6.5 6.4 - 8.2 g/dL    Albumin 2.2 (L) 3.5 - 5.0 g/dL    Globulin 4.3 (H) 2.0 - 4.0 g/dL    A-G Ratio 0.5 (L) 1.1 - 2.2     ETHYL ALCOHOL    Collection Time: 03/31/22  1:57 PM   Result Value Ref Range    ALCOHOL(ETHYL),SERUM <4 <10 mg/dL   MAGNESIUM    Collection Time: 03/31/22  1:57 PM   Result Value Ref Range    Magnesium 1.4 (L) 1.6 - 2.4 mg/dL   LACTIC ACID    Collection Time: 03/31/22  1:57 PM   Result Value Ref Range    Lactic acid 12.7 (HH) 0.4 - 2.0 mmol/L   LACTIC ACID    Collection Time: 03/31/22  5:45 PM   Result Value Ref Range    Lactic acid 0.5 0.4 - 2.0 mmol/L   ACETONE/KETONE, QL    Collection Time: 03/31/22  5:45 PM   Result Value Ref Range    Acetone/Ketone serum, QL.  Negative     AMMONIA    Collection Time: 03/31/22  5:45 PM   Result Value Ref Range    Ammonia, plasma <10 <32 umol/L   LIPASE    Collection Time: 03/31/22  5:45 PM   Result Value Ref Range    Lipase 89 73 - 393 U/L   C REACTIVE PROTEIN, QT    Collection Time: 03/31/22  9:34 PM   Result Value Ref Range    C-Reactive protein 2.20 (H) 0.00 - 0.60 mg/dL   LD    Collection Time: 03/31/22  9:34 PM   Result Value Ref Range     (H) 85 - 241 U/L   TSH 3RD GENERATION    Collection Time: 03/31/22  9:34 PM   Result Value Ref Range    TSH 1.26 0.36 - 3.74 uIU/mL   BILIRUBIN, DIRECT    Collection Time: 03/31/22  9:34 PM   Result Value Ref Range    Bilirubin, direct 12.7 (H) 0.0 - 0.2 mg/dL   URIC ACID    Collection Time: 03/31/22  9:34 PM   Result Value Ref Range    Uric acid 6.5 3.5 - 7.2 mg/dL   CBC WITH AUTOMATED DIFF    Collection Time: 04/01/22  6:21 AM   Result Value Ref Range    WBC 5.2 4.1 - 11.1 K/uL    RBC 2.43 (L) 4.10 - 5.70 M/uL    HGB 8.4 (L) 12.1 - 17.0 g/dL    HCT 24.5 (L) 36.6 - 50.3 %    .8 (H) 80.0 - 99.0 FL    MCH 34.6 (H) 26.0 - 34.0 PG    MCHC 34.3 30.0 - 36.5 g/dL    RDW 19.5 (H) 11.5 - 14.5 %    PLATELET 943 (L) 167 - 400 K/uL    MPV 12.0 8.9 - 12.9 FL    NRBC 0.4 (H) 0.0  WBC    ABSOLUTE NRBC 0.02 (H) 0.00 - 0.01 K/uL    NEUTROPHILS PENDING %    LYMPHOCYTES PENDING %    MONOCYTES PENDING %    EOSINOPHILS PENDING %    BASOPHILS PENDING %    IMMATURE GRANULOCYTES PENDING %    ABS. NEUTROPHILS PENDING K/UL    ABS. LYMPHOCYTES PENDING K/UL    ABS. MONOCYTES PENDING K/UL    ABS. EOSINOPHILS PENDING K/UL    ABS. BASOPHILS PENDING K/UL    ABS. IMM. GRANS. PENDING K/UL    DF PENDING    SED RATE (ESR)    Collection Time: 04/01/22  6:21 AM   Result Value Ref Range    Sed rate, automated 63 (H) 0 - 15 mm/hr        ABD LTD    (Results Pending)        Assessment:    Cholestatic Jaundice likely  superimposed on alcoholic liver disease. Etiology of hyperbilirubinemia not entirely clear. CT suggest possible cholecystitis although patient has no symptoms or abdominal tenderness.   Occult malignancy also a possibility    Alcohol related seizure    History of COVID in October 2021    Macrocytic Anemia    Thrombocytopenia due to alcohol abuse and liver disease    Hyponatremia      Plan:  -Awaiting AFP, WILMA, Acute hepatitis panel  -Continue IV fluids, repeat BMP in am  -Order Ultrasound, switch diet to NPO until after ultrasound,  if inconclusive will order MRCP   -Repeat labs in am   -Consider HIDA scan  -Awaiting GI consult  -Neurology consult saw patient, no neurological workup at this time. Care Plan discussed with: Patient/Family    Disposition: Continued inpatient care    Total time spent with patient: 30 minutes.     Evelin Rudd MD

## 2022-04-01 NOTE — ROUTINE PROCESS
Bedside and Verbal shift change report given to Hayley Steen (oncoming nurse) by Octavio Johnson (offgoing nurse). Report included the following information SBAR and MAR.

## 2022-04-01 NOTE — PROGRESS NOTES
TRANSFER - OUT REPORT:    Verbal report given to Denise ANGELES on [employer].  being transferred to ICU for AMS. Patient transferred to 268 on ICU. Report consisted of patients Situation, Background, Assessment and   Recommendations(SBAR). Information from the following reportwas reviewed with the receiving nurse. Lines:   Peripheral IV 04/01/22 Right Forearm (Active)   Plan of care  Alcohol withdraw  Behaviors    Opportunity for questions and clarification was provided. Patient transported with: all belongings. Patient lift in stable condition.

## 2022-04-01 NOTE — ED PROVIDER NOTES
EMERGENCY DEPARTMENT HISTORY AND PHYSICAL EXAM      Date: 3/31/2022  Patient Name: Alysa Cardona. History of Presenting Illness     Chief Complaint   Patient presents with    Seizure       History Provided By: Patient    HPI: Alysa Cardona., 42 y.o. male with a past medical history significant seizure presents to the ED as transfer from outside facility for evaluation of suspected withdrawal seizures. Patient also found to have significant liver related lab abnormalities at outside facility. Patient states that he has had seizures previously related to decreased in alcohol intake. Patient states that his last drink was approximately 2 days ago. Patient reports no heavy or consistent alcohol intake. There are no other complaints, changes, or physical findings at this time.     PCP: None    Current Facility-Administered Medications on File Prior to Encounter   Medication Dose Route Frequency Provider Last Rate Last Admin    [COMPLETED] sodium chloride 0.9 % bolus infusion 1,000 mL  1,000 mL IntraVENous ONCE Otilia Cornell MD   IV Completed at 03/31/22 1505    [COMPLETED] levETIRAcetam (KEPPRA) 1,500 mg in 0.9% sodium chloride 100 mL IVPB  1,500 mg IntraVENous ONCE Otilia Cornell MD   IV Completed at 03/31/22 1420    [COMPLETED] sodium chloride 0.9 % bolus infusion 1,000 mL  1,000 mL IntraVENous ONCE Otilia Cornell MD 1,000 mL/hr at 03/31/22 1711 1,000 mL at 03/31/22 1711    [COMPLETED] iopamidoL (ISOVUE-370) 76 % injection 100 mL  100 mL IntraVENous RAD ONCE Otilia Cornell MD   100 mL at 03/31/22 1605    [COMPLETED] magnesium sulfate 2 g/50 ml IVPB (premix or compounded)  2 g IntraVENous NOW Otilia Cornell MD 25 mL/hr at 03/31/22 1712 2 g at 03/31/22 1712    [DISCONTINUED] sodium chloride 0.9 % bolus infusion 1,000 mL  1,000 mL IntraVENous ONCE Otilia Cornell MD         Current Outpatient Medications on File Prior to Encounter   Medication Sig Dispense Refill    [DISCONTINUED] amoxicillin 500 mg tab Take 500 mg by mouth three (3) times daily for 10 days. 30 Tablet 0    [DISCONTINUED] albuterol (PROVENTIL HFA, VENTOLIN HFA, PROAIR HFA) 90 mcg/actuation inhaler Take 2 Puffs by inhalation every four (4) hours as needed for Wheezing or Shortness of Breath. 18 g 0    [DISCONTINUED] ascorbic acid, vitamin C, (VITAMIN C) 500 mg tablet Take 1 Tablet by mouth two (2) times a day. 60 Tablet 0    [DISCONTINUED] azithromycin (ZITHROMAX) 250 mg tablet Take 2 Tablets by mouth daily. 6 Tablet 0    [DISCONTINUED] cetirizine (ZYRTEC) 10 mg tablet Take 1 Tablet by mouth daily. 30 Tablet 0    [DISCONTINUED] cholecalciferol (VITAMIN D3) (2,000 UNITS /50 MCG) cap capsule Take 1 Capsule by mouth daily. 30 Capsule 0    [DISCONTINUED] mometasone-formoterol (DULERA) 200-5 mcg/actuation HFA inhaler Take 2 Puffs by inhalation two (2) times a day. 1 Each 0    [DISCONTINUED] montelukast (SINGULAIR) 10 mg tablet Take 1 Tablet by mouth nightly. 30 Tablet 0    [DISCONTINUED] zinc sulfate (ZINCATE) 50 mg zinc (220 mg) capsule Take 1 Capsule by mouth daily. 30 Capsule 0       Past History     Past Medical History:  Past Medical History:   Diagnosis Date    [facility])        Past Surgical History:  History reviewed. No pertinent surgical history. Family History:  Family History   Problem Relation Age of Onset    No Known Problems Mother     No Known Problems Father        Social History:  Social History     Tobacco Use    Smoking status: Current Every Day Smoker     Packs/day: 0.25    Smokeless tobacco: Never Used   Vaping Use    Vaping Use: Never used   Substance Use Topics    Alcohol use: Yes     Comment: occasionally     Drug use: Never       Allergies:  No Known Allergies      Review of Systems     Review of Systems   Constitutional: Negative for chills and fever. HENT: Negative for congestion and rhinorrhea.     Eyes: Negative for photophobia and visual disturbance. Respiratory: Negative for cough and shortness of breath. Cardiovascular: Negative for chest pain and palpitations. Gastrointestinal: Negative for abdominal pain, diarrhea, nausea and vomiting. Genitourinary: Negative for difficulty urinating and dysuria. Musculoskeletal: Negative for arthralgias and myalgias. Skin: Negative for color change and rash. Neurological: Positive for seizures. Negative for weakness and headaches. Psychiatric/Behavioral: Negative for dysphoric mood and sleep disturbance. Physical Exam     Physical Exam  Constitutional:       General: He is not in acute distress. Appearance: Normal appearance. He is not ill-appearing. HENT:      Head: Normocephalic and atraumatic. Right Ear: External ear normal.      Left Ear: External ear normal.      Nose: Nose normal.      Mouth/Throat:      Mouth: Mucous membranes are moist.   Eyes:      General: Scleral icterus present. Extraocular Movements: Extraocular movements intact. Conjunctiva/sclera: Conjunctivae normal.      Pupils: Pupils are equal, round, and reactive to light. Cardiovascular:      Rate and Rhythm: Normal rate and regular rhythm. Pulses: Normal pulses. Pulmonary:      Effort: Pulmonary effort is normal. No respiratory distress. Breath sounds: Normal breath sounds. Abdominal:      General: Abdomen is flat. There is no distension. Tenderness: There is no abdominal tenderness. There is no right CVA tenderness, guarding or rebound. Musculoskeletal:         General: Normal range of motion. Cervical back: Normal range of motion. Skin:     General: Skin is warm and dry. Neurological:      General: No focal deficit present. Mental Status: He is alert and oriented to person, place, and time. Psychiatric:         Mood and Affect: Mood normal.         Behavior: Behavior normal.         Thought Content:  Thought content normal.         Judgment: Judgment normal.         Lab and Diagnostic Study Results     Labs -     Recent Results (from the past 12 hour(s))   CBC WITH AUTOMATED DIFF    Collection Time: 03/31/22  1:57 PM   Result Value Ref Range    WBC 6.6 4.4 - 11.3 K/uL    RBC 2.94 (L) 4.50 - 5.90 M/uL    HGB 10.4 (L) 13.5 - 17.5 g/dL    HCT 31.2 (L) 41 - 53 %    .1 (H) 80 - 100 FL    MCH 35.3 (H) 31 - 34 PG    MCHC 33.3 31.0 - 36.0 g/dL    RDW 22.1 (H) 11.5 - 14.5 %    PLATELET 459 (L) 998 - 400 K/uL    MPV 9.8 6.5 - 11.5 FL    NRBC 0.7  WBC    ABSOLUTE NRBC 0.05 K/uL    NEUTROPHILS 46 42 - 75 %    LYMPHOCYTES 47 20.5 - 51.1 %    MONOCYTES 5 1.7 - 9.3 %    EOSINOPHILS 1 0.9 - 2.9 %    BASOPHILS 1 0.0 - 2.5 %    ABS. NEUTROPHILS 3.0 1.8 - 7.7 K/UL    ABS. LYMPHOCYTES 3.1 1.0 - 4.8 K/UL    ABS. MONOCYTES 0.3 0.2 - 2.4 K/UL    ABS. EOSINOPHILS 0.1 0.0 - 0.7 K/UL    ABS. BASOPHILS 0.1 0.0 - 0.2 K/UL   PROTHROMBIN TIME + INR    Collection Time: 03/31/22  1:57 PM   Result Value Ref Range    Prothrombin time 12.8 11.9 - 14.6 sec    INR 1.0 0.9 - 1.1     METABOLIC PANEL, COMPREHENSIVE    Collection Time: 03/31/22  1:57 PM   Result Value Ref Range    Sodium 131 (L) 136 - 145 mmol/L    Potassium 4.6 3.5 - 5.1 mmol/L    Chloride 92 (L) 97 - 108 mmol/L    CO2 21 21 - 32 mmol/L    Anion gap 18 (H) 5 - 15 mmol/L    Glucose 147 (H) 65 - 100 mg/dL    BUN 8 6 - 20 mg/dL    Creatinine 1.15 0.70 - 1.30 mg/dL    BUN/Creatinine ratio 7 (L) 12 - 20      GFR est AA >60 >60 ml/min/1.73m2    GFR est non-AA >60 >60 ml/min/1.73m2    Calcium 7.9 (L) 8.5 - 10.1 mg/dL    Bilirubin, total 18.0 (H) 0.2 - 1.0 mg/dL    AST (SGOT) 251 (H) 15 - 37 U/L    ALT (SGPT) 75 12 - 78 U/L    Alk.  phosphatase 871 (H) 45 - 117 U/L    Protein, total 6.5 6.4 - 8.2 g/dL    Albumin 2.2 (L) 3.5 - 5.0 g/dL    Globulin 4.3 (H) 2.0 - 4.0 g/dL    A-G Ratio 0.5 (L) 1.1 - 2.2     ETHYL ALCOHOL    Collection Time: 03/31/22  1:57 PM   Result Value Ref Range    ALCOHOL(ETHYL),SERUM <4 <10 mg/dL   MAGNESIUM Collection Time: 03/31/22  1:57 PM   Result Value Ref Range    Magnesium 1.4 (L) 1.6 - 2.4 mg/dL   LACTIC ACID    Collection Time: 03/31/22  1:57 PM   Result Value Ref Range    Lactic acid 12.7 (HH) 0.4 - 2.0 mmol/L   LACTIC ACID    Collection Time: 03/31/22  5:45 PM   Result Value Ref Range    Lactic acid 0.5 0.4 - 2.0 mmol/L   ACETONE/KETONE, QL    Collection Time: 03/31/22  5:45 PM   Result Value Ref Range    Acetone/Ketone serum, QL. Negative     AMMONIA    Collection Time: 03/31/22  5:45 PM   Result Value Ref Range    Ammonia, plasma <10 <32 umol/L   LIPASE    Collection Time: 03/31/22  5:45 PM   Result Value Ref Range    Lipase 89 73 - 393 U/L   C REACTIVE PROTEIN, QT    Collection Time: 03/31/22  9:34 PM   Result Value Ref Range    C-Reactive protein 2.20 (H) 0.00 - 0.60 mg/dL   LD    Collection Time: 03/31/22  9:34 PM   Result Value Ref Range     (H) 85 - 241 U/L   TSH 3RD GENERATION    Collection Time: 03/31/22  9:34 PM   Result Value Ref Range    TSH 1.26 0.36 - 3.74 uIU/mL   BILIRUBIN, DIRECT    Collection Time: 03/31/22  9:34 PM   Result Value Ref Range    Bilirubin, direct 12.7 (H) 0.0 - 0.2 mg/dL   URIC ACID    Collection Time: 03/31/22  9:34 PM   Result Value Ref Range    Uric acid 6.5 3.5 - 7.2 mg/dL       Radiologic Studies -   @lastxrresult@  CT Results  (Last 48 hours)               03/31/22 1605  CT ABD W WO CONT Final result    Impression:      Enlarged fatty liver. Diffuse edematous thickening of gallbladder wall with increased mucosal   enhancement, uncertain significance. This could be related to liver disease or   cholecystitis. Consider ultrasound and/or HIDA scan. Edematous thickening of distal esophagus/GE junction,? inflammatory changes? Thickened edematous-appearing colon wall raises question of diffuse colitis. Narrative: Indication: Elevated liver enzymes. Jaundice.        Dose reduction: All CT scans done at this facility are performed using dose   reduction optimization techniques as appropriate to a performed exam including   the following: Automated exposure control, adjustments of the mA and/or kV   according to patient size, or use of iterative reconstruction technique. CT abdomen, 100 cc Isovue-370 IV, arterial and venous phase and delayed images   the following unenhanced images 3/31/2022. No comparison. Diffuse enlarged fatty liver. Normal spleen. Prominent diffuse edematous thickening of gallbladder wall with prominent   mucosal enhancement. Pericholecystic fat unremarkable. No bile duct dilatation. Normal pancreas, adrenal glands. Mild diffuse thickening of distal esophagus and GE junction. Normal appendix. Normal small bowel. Thickened appearance of entirety of the included colon. Rectum not imaged. Unremarkable pericolic fat. Subcentimeter right renal cyst.   Normal left kidney. No hydronephrosis. Normal abdominal aorta. Patent visceral arteries. Minimal umbilical fat hernia. Patent SMV, portal veins, splenic vein. Normal bones. Mildly prominent portacaval lymph node 1 cm x 2.4 cm. CXR Results  (Last 48 hours)    None            Medical Decision Making   - I am the first provider for this patient. - I reviewed the vital signs, available nursing notes, past medical history, past surgical history, family history and social history. - Initial assessment performed. The patients presenting problems have been discussed, and they are in agreement with the care plan formulated and outlined with them. I have encouraged them to ask questions as they arise throughout their visit. Vital Signs-Reviewed the patient's vital signs.   Patient Vitals for the past 12 hrs:   Temp Pulse Resp BP SpO2   03/31/22 2222 99.1 °F (37.3 °C) 81 18 (!) 140/93 98 %   03/31/22 2141    (!) 139/98    03/31/22 2131    (!) 143/99    03/31/22 1911 97.6 °F (36.4 °C) 83 14 (!) 154/112 98 %       Records Reviewed: Nursing Notes, Old Medical Records, Previous Radiology Studies and Previous Laboratory Studies    The patient presents with seizure with a differential diagnosis of epilepsy, alcohol withdrawal, electrolyte abnormality, intracranial pathology      ED Course:          Provider Notes (Medical Decision Making):   50-year-old male with past medical history significant for seizure presents to the ED for evaluation of seizure. Patient transferred from outside facility for evaluation of seizure in the setting of significantly elevated bilirubin, alk phos, transaminitis, lactate. On physical examination, patient is resting comfortably in bed. He does have scleral icterus. His abdomen is soft, nontender, nondistended without guarding, rebound, rigidity. Patient is alert and oriented. He has no notable neuro deficits. Patient signed out to admitting physician, Dr. Shila Spencer for further evaluation and treatment in the hospital.  MDM       Procedures   Medical Decision Makingedical Decision Making  Performed by: Perry Hua DO  PROCEDURES:  Procedures       Disposition   Disposition: Admitted to Floor Medical Floor the case was discussed with the admitting physician     Admitted      Diagnosis     Clinical Impression:   1. Seizure [facility])        Attestations:    Perry Hua DO    Please note that this dictation was completed with [employer], the computer voice recognition software. Quite often unanticipated grammatical, syntax, homophones, and other interpretive errors are inadvertently transcribed by the computer software. Please disregard these errors. Please excuse any errors that have escaped final proofreading. Thank you.

## 2022-04-01 NOTE — PROGRESS NOTES
4/1/2022    Patient is threatening to leave the hospital [address] He has been acting confused, agitated and is disoriented. He thought he was at the [facility]. Patient clearly does not have the capacity to make this decision. He was briefly evaluated by psychiatry who agrees patient does not have capacity. I spoke with patient's wife who indicates she actually drinks much heavier than he light on.   She states she had been drinking every day, all day for the past 3 weeks straight until about 2 or 3 days ago    Patient is clearly in alcohol withdrawal at this time and requires emergent medical treatment in the hospital    I will contact the [employer] to obtain medical TDO    We will initiate [facility] alcohol withdrawal protocol      We will transfer patient to the ICU          Darlene Mendez MD

## 2022-04-01 NOTE — H&P
History and Physical              Subjective :   Chief Complaint : Seizure-like activity with altered mental status    Source of information : Patient, referring facility records and our ED provider    History of present illness:   42 y.o. male with remote history of seizures 3 years ago one time presents to the referring facility emergency room after he developed a seizure-like activity with fall and altered mental status weakness by the family. He denies any fever, chills. Denies any nausea or vomiting. Little more than 2 weeks ago on March 11 presented to the emergency room complaining of feeling dizzy and lightheaded when he was trying to move boxes. At that time on laboratory data he has mild metabolic acidosis and bilirubin was elevated at 8. Thought that he was may be drinking alcohol. Today on the laboratory data he has significantly elevated bilirubin, and due to seizure-like activity  and needing a neurological evaluation transferred here for further management. Patient states that he works 5 days a week, on 2 days that he is off he drink alcohol. He drinks 40 ounce beer 2 cans daily only those 2 days. Denies any drinking heavy alcohol intake recently. He denies any drug abuse. October 2021 he had COVID-19 infection happened. Currently he is not on any medications. Past Medical History:   Diagnosis Date    Seizure [facility])      Surgical history: None      Family History   Problem Relation Age of Onset    No Known Problems Mother     No Known Problems Father       Social History     Tobacco Use    Smoking status: Current Every Day Smoker     Packs/day: 0.25    Smokeless tobacco: Never Used   Substance Use Topics    Alcohol use: Yes     Comment: occasionally          Home medications: None, denies use of any over-the-counter medications. Allergies: None         Review of Systems:  Constitutional: Appetite is fair. Denies weight loss. Admits fatigue.   No fever, no chills, no night sweats. Eye: No recent visual disturbances, no discharge, no double vision. Ear/nose/mouth/throat : No hearing disturbance, no ear pain, no nasal congestion, no sore throat, no trouble swallowing. Respiratory : No trouble breathing, no cough, no shortness of breath, no hemoptysis, no wheezing. Cardiovascular : No chest pain, no palpitation, no orthopnea,  no peripheral edema. Gastrointestinal : No nausea, no vomiting, no diarrhea,  No abdominal pain. Genitourinary : No dysuria, no hematuria, no increased frequency, No incontinence. Lymphatics : He is unable to answer. Endocrine : No excessive thirst, No polyuria No cold intolerance, No heat intolerance. Immunologic : No hives, urticaria, No seasonal allergies. Musculoskeletal : Admits generalized weakness all over the body. No joint swelling, No pain, No effusion,   Integumentary : He started noticing yellow discoloration of the skin and conjunctiva. .  Hematology : No petechiae, No easy bruising,  No tendency to bleed easy. Neurology : Denies change in mental status, No abnormal balance, No headache, No confusion, No numbness or tingling. Psychiatric : No mood swings, No anxiety, No depression. Vitals:     Patient Vitals for the past 12 hrs:   Temp Pulse Resp BP SpO2   03/31/22 1911 97.6 °F (36.4 °C) 83 14 (!) 154/112 98 %       Physical Exam:   General : Thin built, looks younger than stated age. No distress noted. HEENT : PERRLA, alert discoloration of conjunctiva noted. Pale oral mucosa, atraumatic normocephalic, Normal ear and nose. Neck : Supple, no JVD, no masses noted, no carotid bruit. Lungs : Breath sounds with good air entry bilaterally, no wheezes or rales, no accessory muscle use. CVS : Rhythm rate regular, S1+, S2+, no murmur or gallop. Abdomen : Soft, nontender, no organomegaly, bowel sounds active. Extremities : No edema noted,  pedal pulses palpable.   Musculoskeletal : Fair range of motion, no joint swelling or effusion, muscle tone and power appears fair. Skin : Dry, poor skin turgor. , no pathological rash. Did not notice any spider nevi or other abnormalities. Lymphatic : No cervical lymphadenopathy. Neurological : Awake, alert, oriented to time place person. No neurological deficits. Psychiatric : Mood and affect appears appropriate to the situation. Data Review:   Recent Results (from the past 24 hour(s))   CBC WITH AUTOMATED DIFF    Collection Time: 03/31/22  1:57 PM   Result Value Ref Range    WBC 6.6 4.4 - 11.3 K/uL    RBC 2.94 (L) 4.50 - 5.90 M/uL    HGB 10.4 (L) 13.5 - 17.5 g/dL    HCT 31.2 (L) 41 - 53 %    .1 (H) 80 - 100 FL    MCH 35.3 (H) 31 - 34 PG    MCHC 33.3 31.0 - 36.0 g/dL    RDW 22.1 (H) 11.5 - 14.5 %    PLATELET 013 (L) 420 - 400 K/uL    MPV 9.8 6.5 - 11.5 FL    NRBC 0.7  WBC    ABSOLUTE NRBC 0.05 K/uL    NEUTROPHILS 46 42 - 75 %    LYMPHOCYTES 47 20.5 - 51.1 %    MONOCYTES 5 1.7 - 9.3 %    EOSINOPHILS 1 0.9 - 2.9 %    BASOPHILS 1 0.0 - 2.5 %    ABS. NEUTROPHILS 3.0 1.8 - 7.7 K/UL    ABS. LYMPHOCYTES 3.1 1.0 - 4.8 K/UL    ABS. MONOCYTES 0.3 0.2 - 2.4 K/UL    ABS. EOSINOPHILS 0.1 0.0 - 0.7 K/UL    ABS.  BASOPHILS 0.1 0.0 - 0.2 K/UL   PROTHROMBIN TIME + INR    Collection Time: 03/31/22  1:57 PM   Result Value Ref Range    Prothrombin time 12.8 11.9 - 14.6 sec    INR 1.0 0.9 - 1.1     METABOLIC PANEL, COMPREHENSIVE    Collection Time: 03/31/22  1:57 PM   Result Value Ref Range    Sodium 131 (L) 136 - 145 mmol/L    Potassium 4.6 3.5 - 5.1 mmol/L    Chloride 92 (L) 97 - 108 mmol/L    CO2 21 21 - 32 mmol/L    Anion gap 18 (H) 5 - 15 mmol/L    Glucose 147 (H) 65 - 100 mg/dL    BUN 8 6 - 20 mg/dL    Creatinine 1.15 0.70 - 1.30 mg/dL    BUN/Creatinine ratio 7 (L) 12 - 20      GFR est AA >60 >60 ml/min/1.73m2    GFR est non-AA >60 >60 ml/min/1.73m2    Calcium 7.9 (L) 8.5 - 10.1 mg/dL    Bilirubin, total 18.0 (H) 0.2 - 1.0 mg/dL    AST (SGOT) 251 (H) 15 - 37 U/L    ALT (SGPT) 75 12 - 78 U/L Alk. phosphatase 871 (H) 45 - 117 U/L    Protein, total 6.5 6.4 - 8.2 g/dL    Albumin 2.2 (L) 3.5 - 5.0 g/dL    Globulin 4.3 (H) 2.0 - 4.0 g/dL    A-G Ratio 0.5 (L) 1.1 - 2.2     ETHYL ALCOHOL    Collection Time: 03/31/22  1:57 PM   Result Value Ref Range    ALCOHOL(ETHYL),SERUM <4 <10 mg/dL   MAGNESIUM    Collection Time: 03/31/22  1:57 PM   Result Value Ref Range    Magnesium 1.4 (L) 1.6 - 2.4 mg/dL   LACTIC ACID    Collection Time: 03/31/22  1:57 PM   Result Value Ref Range    Lactic acid 12.7 (HH) 0.4 - 2.0 mmol/L   LACTIC ACID    Collection Time: 03/31/22  5:45 PM   Result Value Ref Range    Lactic acid 0.5 0.4 - 2.0 mmol/L   ACETONE/KETONE, QL    Collection Time: 03/31/22  5:45 PM   Result Value Ref Range    Acetone/Ketone serum, QL. Negative     AMMONIA    Collection Time: 03/31/22  5:45 PM   Result Value Ref Range    Ammonia, plasma <10 <32 umol/L   LIPASE    Collection Time: 03/31/22  5:45 PM   Result Value Ref Range    Lipase 89 73 - 393 U/L       Radiologic Studies :   CT Results  (Last 48 hours)               03/31/22 1605  CT ABD W WO CONT Final result    Impression:      Enlarged fatty liver. Diffuse edematous thickening of gallbladder wall with increased mucosal   enhancement, uncertain significance. This could be related to liver disease or   cholecystitis. Consider ultrasound and/or HIDA scan. Edematous thickening of distal esophagus/GE junction,? inflammatory changes? Thickened edematous-appearing colon wall raises question of diffuse colitis. Narrative: Indication: Elevated liver enzymes. Jaundice. Dose reduction: All CT scans done at this facility are performed using dose   reduction optimization techniques as appropriate to a performed exam including   the following: Automated exposure control, adjustments of the mA and/or kV   according to patient size, or use of iterative reconstruction technique.        CT abdomen, 100 cc Isovue-370 IV, arterial and venous phase and delayed images   the following unenhanced images 3/31/2022. No comparison. Diffuse enlarged fatty liver. Normal spleen. Prominent diffuse edematous thickening of gallbladder wall with prominent   mucosal enhancement. Pericholecystic fat unremarkable. No bile duct dilatation. Normal pancreas, adrenal glands. Mild diffuse thickening of distal esophagus and GE junction. Normal appendix. Normal small bowel. Thickened appearance of entirety of the included colon. Rectum not imaged. Unremarkable pericolic fat. Subcentimeter right renal cyst.   Normal left kidney. No hydronephrosis. Normal abdominal aorta. Patent visceral arteries. Minimal umbilical fat hernia. Patent SMV, portal veins, splenic vein. Normal bones. Mildly prominent portacaval lymph node 1 cm x 2.4 cm. CT abdomen/pelvis:    Diffuse enlarged fatty liver. Normal spleen. Prominent diffuse edematous thickening of gallbladder wall with prominent  mucosal enhancement. Pericholecystic fat unremarkable. No bile duct dilatation. Normal pancreas, adrenal glands. Mild diffuse thickening of distal esophagus and GE junction. Normal appendix. Normal small bowel. Thickened appearance of entirety of the included colon. Rectum not imaged. Unremarkable pericolic fat. Subcentimeter right renal cyst. Normal left kidney. No hydronephrosis. Normal abdominal aorta. Patent visceral arteries. Minimal umbilical fat hernia. Patent SMV, portal veins, splenic vein. Normal bones. Mildly prominent portacaval lymph node 1 cm x 2.4 cm.     IMPRESSION     Enlarged fatty liver. Diffuse edematous thickening of gallbladder wall with increased mucosal  enhancement, uncertain significance. This could be related to liver disease or  cholecystitis. Consider ultrasound and/or HIDA scan. Edematous thickening of distal esophagus/GE junction,? inflammatory changes?   Thickened edematous-appearing colon wall raises question of diffuse colitis.         Assessment and Plan :     Hyperbilirubinemia with abnormalities of the CT nonspecific. Need further evaluation for the etiology of this, no previous abnormalities before March 11. And patient sounding like it is not heavy alcohol drinking. With recent history of COVID-19 it can be related to postinfectious  Problems. Requested a consultation with gastroenterology and infectious disease. Seizures: Likely metabolic but need further evaluation, requested neurology consultation    Admitted to medical telemetry, full CODE STATUS, no home medications. No anticoagulation for DVT prophylaxis due to risk of bleeding with current hematological abnormalities. CC : None  Signed By: Aminata Rodriguez MD     March 31, 2022      This dictation was done by dragon, computer voice recognition software. Often unanticipated grammatical, syntax, Savage phones and other interpretive errors are inadvertently transcribed. Please excuse errors that have escaped final proofreading.

## 2022-04-01 NOTE — PROGRESS NOTES
Reason for Readmission:              RUR Score/Risk Level: 14%        PCP: First and Last name:  Giovany Porter MD   Name of Practice:    Are you a current patient: Yes/No:    Approximate date of last visit: About 2 years ago   Can you participate in a virtual visit with your PCP:     Is a Care Conference indicated:     Did you attend your follow up appointment (s): If not, why not:    Resources/supports as identified by patient/family:   Spouse       Top Challenges facing patient (as identified by patient/family and CM): Finances/Medication cost? No concerns      Transportation  See below      Support system or lack thereof? Spouse     Living arrangements? See below        Self-care/ADLs/Cognition? See below          Current Advanced Directive/Advance Care Plan:  Full code    Spouse: Lucio Burris- [phone]           Plan for utilizing home health:  None              Transition of Care Plan:    Based on readmission, the patient's previous Plan of Care   has been evaluated and/or modified. The current Transition of Care Plan is:  Patient confused. CM called patients spouse to complete assessment. Patient lives with spouse. Independent in all care, uses no DME. Patient and spouse do not drive, family members and Medicaid cab provides transportation. DC plan: return home with family and a family member will provide transportation upon DC.

## 2022-04-01 NOTE — PROGRESS NOTES
Problem: Falls - Risk of  Goal: *Absence of Falls  Description: Document Alina Camacho Fall Risk and appropriate interventions in the flowsheet.   Outcome: Progressing Towards Goal  Note: Fall Risk Interventions:            Medication Interventions: Bed/chair exit alarm         History of Falls Interventions: Bed/chair exit alarm         Problem: Patient Education: Go to Patient Education Activity  Goal: Patient/Family Education  Outcome: Progressing Towards Goal     Problem: General Medical Care Plan  Goal: *Vital signs within specified parameters  Outcome: Progressing Towards Goal  Goal: *Labs within defined limits  Outcome: Progressing Towards Goal  Goal: *Absence of infection signs and symptoms  Outcome: Progressing Towards Goal  Goal: *Optimal pain control at patient's stated goal  Outcome: Progressing Towards Goal  Goal: *Skin integrity maintained  Outcome: Progressing Towards Goal  Goal: *Fluid volume balance  Outcome: Progressing Towards Goal  Goal: *Optimize nutritional status  Outcome: Progressing Towards Goal  Goal: *Anxiety reduced or absent  Outcome: Progressing Towards Goal  Goal: *Progressive mobility and function (eg: ADL's)  Outcome: Progressing Towards Goal

## 2022-04-01 NOTE — PROGRESS NOTES
Hospitalist Progress Note               Daily Progress Note: 4/1/2022      Subjective:   Per admitting physician    42 y.o. male with remote history of seizures 3 years ago one time presents to the referring facility emergency room after he developed a seizure-like activity with fall and altered mental status weakness by the family. He denies any fever, chills. Denies any nausea or vomiting. Little more than 2 weeks ago on March 11 presented to the emergency room complaining of feeling dizzy and lightheaded when he was trying to move boxes. At that time on laboratory data he has mild metabolic acidosis and bilirubin was elevated at 8. Thought that he was may be drinking alcohol. Today on the laboratory data he has significantly elevated bilirubin, and due to seizure-like activity  and needing a neurological evaluation transferred here for further management.     Patient states that he works 5 days a week, on 2 days that he is off he drink alcohol. He drinks 40 ounce beer 2 cans daily only those 2 days. Denies any drinking heavy alcohol intake recently. He denies any drug abuse.     October 2021 he had COVID-19 infection happened. Currently he is not on any medications.    -----      Patient seen and examined at bedside, resting comfortably watching tv and enjoying his breakfast. Denies alcohol use, except for having a can of beer or two on his days off from work. He does have a family history of alcohol use disorder from both sides of his family. At this time he has no complaints, denies headache, chest pain, palpitations, sob, abdominal pain, n/v/c/d or any other subjective symptoms.              Problem List:  Problem List as of 4/1/2022 Date Reviewed: 3/31/2022          Codes Class Noted - Resolved    Hyperbilirubinemia ICD-10-CM: E80.6  ICD-9-CM: 782.4  3/31/2022 - Present        Seizure ([facility] [address] ICD-10-CM: R56.9  ICD-9-CM: 780.39  3/31/2022 - Present        Hepatitis, acute ICD-10-CM: B17.9  ICD-9-CM: 570  3/31/2022 - Present        Hypoxia ICD-10-CM: R09.02  ICD-9-CM: 799.02  10/7/2021 - Present        Positive D dimer ICD-10-CM: R79.89  ICD-9-CM: 790.92  10/7/2021 - Present        Pneumonia due to COVID-19 virus ICD-10-CM: U07.1, J12.82  ICD-9-CM: 480.8, 079.89  10/7/2021 - Present              Medications reviewed  Current Facility-Administered Medications   Medication Dose Route Frequency    sodium chloride (NS) flush 5-40 mL  5-40 mL IntraVENous Q8H    sodium chloride (NS) flush 5-40 mL  5-40 mL IntraVENous PRN    ondansetron (ZOFRAN ODT) tablet 4 mg  4 mg Oral Q6H PRN    Or    ondansetron (ZOFRAN) injection 4 mg  4 mg IntraVENous Q6H PRN    0.9% sodium chloride infusion  125 mL/hr IntraVENous CONTINUOUS       Review of Systems:   Not required    Objective:   Physical Exam:     Visit Vitals  /87 (BP 1 Location: Left upper arm, BP Patient Position: Sitting)   Pulse 94   Temp 98.5 °F (36.9 °C)   Resp 14   SpO2 97%      O2 Device: None (Room air)    Temp (24hrs), Av.2 °F (36.8 °C), Min:97.6 °F (36.4 °C), Max:99.1 °F (37.3 °C)    No intake/output data recorded. No intake/output data recorded. Physical Exam  Vitals and nursing note reviewed. Constitutional:       General: He is awake. Appearance: Normal appearance. He is normal weight. HENT:      Mouth/Throat:      Mouth: Mucous membranes are dry. Eyes:      General: Scleral icterus present. Cardiovascular:      Rate and Rhythm: Normal rate and regular rhythm. Pulses: Normal pulses. Pulmonary:      Effort: Pulmonary effort is normal.      Breath sounds: Normal breath sounds. Abdominal:      General: Abdomen is flat. Bowel sounds are normal. There is no distension. Palpations: Abdomen is soft. Tenderness: There is no abdominal tenderness. There is no guarding or rebound. Skin:     General: Skin is warm and dry. Neurological:      General: No focal deficit present.       Mental Status: He is alert and oriented to person, place, and time. Psychiatric:         Behavior: Behavior is cooperative. Data Review:       Recent Days:  Recent Labs     04/01/22  0621 03/31/22  1357   WBC 5.2 6.6   HGB 8.4* 10.4*   HCT 24.5* 31.2*   * 125*     Recent Labs     03/31/22  1357   *   K 4.6   CL 92*   CO2 21   *   BUN 8   CREA 1.15   CA 7.9*   MG 1.4*   ALB 2.2*   TBILI 18.0*   ALT 75   INR 1.0     No results for input(s): PH, PCO2, PO2, HCO3, FIO2 in the last 72 hours. 24 Hour Results:  Recent Results (from the past 24 hour(s))   EKG, 12 LEAD, INITIAL    Collection Time: 03/31/22  1:46 PM   Result Value Ref Range    Ventricular Rate 93 BPM    Atrial Rate 93 BPM    P-R Interval 104 ms    QRS Duration 272 ms    Q-T Interval 473 ms    QTC Calculation (Bezet) 589 ms    Calculated P Axis 74 degrees    Calculated R Axis 60 degrees    Calculated T Axis 65 degrees    Diagnosis       Sinus rhythm  Short GA interval  Nonspecific intraventricular conduction delay  Probable anteroseptal infarct, old     CBC WITH AUTOMATED DIFF    Collection Time: 03/31/22  1:57 PM   Result Value Ref Range    WBC 6.6 4.4 - 11.3 K/uL    RBC 2.94 (L) 4.50 - 5.90 M/uL    HGB 10.4 (L) 13.5 - 17.5 g/dL    HCT 31.2 (L) 41 - 53 %    .1 (H) 80 - 100 FL    MCH 35.3 (H) 31 - 34 PG    MCHC 33.3 31.0 - 36.0 g/dL    RDW 22.1 (H) 11.5 - 14.5 %    PLATELET 021 (L) 972 - 400 K/uL    MPV 9.8 6.5 - 11.5 FL    NRBC 0.7  WBC    ABSOLUTE NRBC 0.05 K/uL    NEUTROPHILS 46 42 - 75 %    LYMPHOCYTES 47 20.5 - 51.1 %    MONOCYTES 5 1.7 - 9.3 %    EOSINOPHILS 1 0.9 - 2.9 %    BASOPHILS 1 0.0 - 2.5 %    ABS. NEUTROPHILS 3.0 1.8 - 7.7 K/UL    ABS. LYMPHOCYTES 3.1 1.0 - 4.8 K/UL    ABS. MONOCYTES 0.3 0.2 - 2.4 K/UL    ABS. EOSINOPHILS 0.1 0.0 - 0.7 K/UL    ABS.  BASOPHILS 0.1 0.0 - 0.2 K/UL   PROTHROMBIN TIME + INR    Collection Time: 03/31/22  1:57 PM   Result Value Ref Range    Prothrombin time 12.8 11.9 - 14.6 sec    INR 1.0 0.9 - 1.1     METABOLIC PANEL, COMPREHENSIVE    Collection Time: 03/31/22  1:57 PM   Result Value Ref Range    Sodium 131 (L) 136 - 145 mmol/L    Potassium 4.6 3.5 - 5.1 mmol/L    Chloride 92 (L) 97 - 108 mmol/L    CO2 21 21 - 32 mmol/L    Anion gap 18 (H) 5 - 15 mmol/L    Glucose 147 (H) 65 - 100 mg/dL    BUN 8 6 - 20 mg/dL    Creatinine 1.15 0.70 - 1.30 mg/dL    BUN/Creatinine ratio 7 (L) 12 - 20      GFR est AA >60 >60 ml/min/1.73m2    GFR est non-AA >60 >60 ml/min/1.73m2    Calcium 7.9 (L) 8.5 - 10.1 mg/dL    Bilirubin, total 18.0 (H) 0.2 - 1.0 mg/dL    AST (SGOT) 251 (H) 15 - 37 U/L    ALT (SGPT) 75 12 - 78 U/L    Alk. phosphatase 871 (H) 45 - 117 U/L    Protein, total 6.5 6.4 - 8.2 g/dL    Albumin 2.2 (L) 3.5 - 5.0 g/dL    Globulin 4.3 (H) 2.0 - 4.0 g/dL    A-G Ratio 0.5 (L) 1.1 - 2.2     ETHYL ALCOHOL    Collection Time: 03/31/22  1:57 PM   Result Value Ref Range    ALCOHOL(ETHYL),SERUM <4 <10 mg/dL   MAGNESIUM    Collection Time: 03/31/22  1:57 PM   Result Value Ref Range    Magnesium 1.4 (L) 1.6 - 2.4 mg/dL   LACTIC ACID    Collection Time: 03/31/22  1:57 PM   Result Value Ref Range    Lactic acid 12.7 (HH) 0.4 - 2.0 mmol/L   LACTIC ACID    Collection Time: 03/31/22  5:45 PM   Result Value Ref Range    Lactic acid 0.5 0.4 - 2.0 mmol/L   ACETONE/KETONE, QL    Collection Time: 03/31/22  5:45 PM   Result Value Ref Range    Acetone/Ketone serum, QL.  Negative     AMMONIA    Collection Time: 03/31/22  5:45 PM   Result Value Ref Range    Ammonia, plasma <10 <32 umol/L   LIPASE    Collection Time: 03/31/22  5:45 PM   Result Value Ref Range    Lipase 89 73 - 393 U/L   C REACTIVE PROTEIN, QT    Collection Time: 03/31/22  9:34 PM   Result Value Ref Range    C-Reactive protein 2.20 (H) 0.00 - 0.60 mg/dL   LD    Collection Time: 03/31/22  9:34 PM   Result Value Ref Range     (H) 85 - 241 U/L   TSH 3RD GENERATION    Collection Time: 03/31/22  9:34 PM   Result Value Ref Range TSH 1.26 0.36 - 3.74 uIU/mL   BILIRUBIN, DIRECT    Collection Time: 03/31/22  9:34 PM   Result Value Ref Range    Bilirubin, direct 12.7 (H) 0.0 - 0.2 mg/dL   URIC ACID    Collection Time: 03/31/22  9:34 PM   Result Value Ref Range    Uric acid 6.5 3.5 - 7.2 mg/dL   CBC WITH AUTOMATED DIFF    Collection Time: 04/01/22  6:21 AM   Result Value Ref Range    WBC 5.2 4.1 - 11.1 K/uL    RBC 2.43 (L) 4.10 - 5.70 M/uL    HGB 8.4 (L) 12.1 - 17.0 g/dL    HCT 24.5 (L) 36.6 - 50.3 %    .8 (H) 80.0 - 99.0 FL    MCH 34.6 (H) 26.0 - 34.0 PG    MCHC 34.3 30.0 - 36.5 g/dL    RDW 19.5 (H) 11.5 - 14.5 %    PLATELET 008 (L) 242 - 400 K/uL    MPV 12.0 8.9 - 12.9 FL    NRBC 0.4 (H) 0.0  WBC    ABSOLUTE NRBC 0.02 (H) 0.00 - 0.01 K/uL    NEUTROPHILS PENDING %    LYMPHOCYTES PENDING %    MONOCYTES PENDING %    EOSINOPHILS PENDING %    BASOPHILS PENDING %    IMMATURE GRANULOCYTES PENDING %    ABS. NEUTROPHILS PENDING K/UL    ABS. LYMPHOCYTES PENDING K/UL    ABS. MONOCYTES PENDING K/UL    ABS. EOSINOPHILS PENDING K/UL    ABS. BASOPHILS PENDING K/UL    ABS. IMM. GRANS. PENDING K/UL    DF PENDING        US ABD LTD    (Results Pending)        Assessment:    Cholestatic Jaundice likely due to  superimposed on alcoholic liver disease    Seizure/History of Alcohol Use Disorder: likely a combination that resulted in the seizure from possible withdrawal.     History of COVID in October 2021    Macrocytic Anemia    Thrombocytopenia    Hyponatremia      Plan:  -Awaiting AFP, WILMA, Acute hepatitis panel  -Continue IV fluids, repeat BMP in am  -Order Ultrasound, switch diet to NPO until after ultrasound,  if inconclusive will order MRCP   -Repeat labs in am   -Consider HIDA scan  -Awaiting GI consult  -Neurology consult saw patient, no neurological workup at this time. Care Plan discussed with: Patient/Family    Disposition:     Total time spent with patient: 30 minutes.     Beny Ramirez  *ATTENTION:  This note has been created by a medical student for educational purposes only. Please do not refer to the content of this note for clinical decision-making, billing, or other purposes. Please see attending physicians note to obtain clinical information on this patient. *

## 2022-04-01 NOTE — CONSULTS
NEUROLOGY CONSULT    Name Donny Tanner. Age 42 y.o. MRN [id number]  1980     Referring Physician: None    Chief Complaint: Seizure activity     This is a 42 y.o. right handed -American male he was admitted overnight in transfer from a hospital in [facility] where he presented after a fainting spell followed by seizure activity at work. According to the patient this is his new job and he was feeling lightheaded and not good saw somebody for could sit down. He waited for 5 minutes and that made him feel better so he tried to go back to work and as he was walking this last thing he remembers. According to the nurse he was reportedly sweating profusely and his close were soaking wet and sweaty. Patient woke up in the ambulance and he was told that he had some seizure activity. Patient had been to the ER on  with an episode of dizziness and lightheaded when he was trying to move some boxes. At the time of my evaluation this morning the patient is sitting at the edge of the bed, has taken off all his cardiac monitoring leads sitting on his lap and according to him they came off from his chest.  He denied any symptoms or any more seizures overnight. He was not dizzy or lightheaded. Assessment and Plan:  1. Syncopal episode followed by seizure activity: On the basis of the history seems like patient had a syncopal episode preceded by lightheadedness excessive sweating. His abnormal liver functions and alcohol dependence could have contributed to the episode. I doubt any primary etiology for the seizure and will not recommend any neurological work-up at this point. I also agree with not starting any antiepileptic medicines. 2.  Abnormal liver function with very high bilirubin 18: Patient's CT scan showed some fatty liver with swelling. In the light of abnormal enzymes, agree with the GI consult and their work-up.   3.  History of seizures 3 years ago but none since: Seems like related to his alcohol dependence. Thank you for the consult. No Known Allergies     Current Facility-Administered Medications   Medication Dose Route Frequency    sodium chloride (NS) flush 5-40 mL  5-40 mL IntraVENous Q8H    sodium chloride (NS) flush 5-40 mL  5-40 mL IntraVENous PRN    ondansetron (ZOFRAN ODT) tablet 4 mg  4 mg Oral Q6H PRN    Or    ondansetron (ZOFRAN) injection 4 mg  4 mg IntraVENous Q6H PRN    0.9% sodium chloride infusion  125 mL/hr IntraVENous CONTINUOUS    dexAMETHasone (DECADRON) tablet 6 mg  6 mg Oral DAILY     Past Medical History:   Diagnosis Date    Seizure ([facility] [address]      Social History     Tobacco Use    Smoking status: Current Every Day Smoker     Packs/day: 0.25    Smokeless tobacco: Never Used   Vaping Use    Vaping Use: Never used   Substance Use Topics    Alcohol use: Yes     Comment: occasionally     Drug use: Never     Exam  Visit Vitals  /87 (BP 1 Location: Left upper arm, BP Patient Position: Sitting)   Pulse 94   Temp 98.5 °F (36.9 °C)   Resp 14   SpO2 97%     General: Well developed, well nourished. Patient in no distress   Head: Normocephalic, atraumatic, anicteric sclera   Neck Normal ROM, No thyromegally               Ext: No pedal edema   Skin: Supple no rash     NeurologicExam:  Mental Status: Alert and oriented to person place and time   Speech: Fluent no aphasia or dysarthria. Cranial Nerves:   Pupils are equal round and reactive to light and accommodation, extraocular movements are intact and full, visual fields are intact by confrontation, no nystagmus noted, face is symmetric, sensation on face is intact and symmetric, hearing is intact and symmetric, tongue and uvula are in midline with normal movements, palate is elevating equally, shoulder shrug is intact and symmetric. Motor:  Full and symmetric strength of upper and lower ext . Normal bulk and tone. Reflexes:   Deep tendon reflexes 1/4 and symmetric.    Sensory:   Symmetric and intact Gait:  Gait is deferred   Tremor:   No tremor noted. Cerebellar:  Coordination intact for finger-nose-finger. Neurovascular: No carotid bruits.  No JVD   Lab Review  Lab Results   Component Value Date/Time    WBC 5.2 04/01/2022 06:21 AM    HCT 24.5 (L) 04/01/2022 06:21 AM    HGB 8.4 (L) 04/01/2022 06:21 AM    PLATELET 530 (L) 82/26/0117 06:21 AM     Lab Results   Component Value Date/Time    Sodium 131 (L) 03/31/2022 01:57 PM    Potassium 4.6 03/31/2022 01:57 PM    Chloride 92 (L) 03/31/2022 01:57 PM    CO2 21 03/31/2022 01:57 PM    Glucose 147 (H) 03/31/2022 01:57 PM    BUN 8 03/31/2022 01:57 PM    Creatinine 1.15 03/31/2022 01:57 PM    Calcium 7.9 (L) 03/31/2022 01:57 PM     No results found for: B12LT, FOL, RBCF  No results found for: LDL, LDLC, DLDLP  No results found for: HBA1C, OSX0DRWJ, BUO7BDHO  No components found for: TROPQUANT  No results found for: WILMA

## 2022-04-02 ENCOUNTER — APPOINTMENT (OUTPATIENT)
Dept: GENERAL RADIOLOGY | Age: 42
End: 2022-04-02
Attending: INTERNAL MEDICINE
Payer: MEDICAID

## 2022-04-02 LAB
25(OH)D3 SERPL-MCNC: <9 NG/ML (ref 30–100)
AFP-TM SERPL-MCNC: 6.4 NG/ML (ref 0–6.9)
ALBUMIN SERPL-MCNC: 2.2 G/DL (ref 3.5–5)
ALBUMIN/GLOB SERPL: 0.6 {RATIO} (ref 1.1–2.2)
ALP SERPL-CCNC: 706 U/L (ref 45–117)
ALT SERPL-CCNC: 62 U/L (ref 12–78)
ANION GAP SERPL CALC-SCNC: 7 MMOL/L (ref 5–15)
AST SERPL W P-5'-P-CCNC: 192 U/L (ref 15–37)
BILIRUB SERPL-MCNC: 14.9 MG/DL (ref 0.2–1)
BUN SERPL-MCNC: 3 MG/DL (ref 6–20)
BUN/CREAT SERPL: 4 (ref 12–20)
CA-I BLD-MCNC: 7.9 MG/DL (ref 8.5–10.1)
CHLORIDE SERPL-SCNC: 109 MMOL/L (ref 97–108)
CO2 SERPL-SCNC: 25 MMOL/L (ref 21–32)
CREAT SERPL-MCNC: 0.7 MG/DL (ref 0.7–1.3)
FERRITIN SERPL-MCNC: 4634 NG/ML (ref 26–388)
FOLATE SERPL-MCNC: 3.7 NG/ML (ref 5–21)
GLOBULIN SER CALC-MCNC: 3.8 G/DL (ref 2–4)
GLUCOSE SERPL-MCNC: 171 MG/DL (ref 65–100)
HAV IGM SER QL: NONREACTIVE
HBV CORE IGM SER QL: NONREACTIVE
HBV SURFACE AG SER QL: 0.26 INDEX
HBV SURFACE AG SER QL: NEGATIVE
HCV AB SER IA-ACNC: 0.03 INDEX
HCV AB SERPL QL IA: NONREACTIVE
HIV 1+2 AB+HIV1 P24 AG SERPL QL IA: NONREACTIVE
HIV12 RESULT COMMENT, HHIVC: NORMAL
INR PPP: 1.1 (ref 0.9–1.1)
IRON SATN MFR SERPL: 83 % (ref 20–50)
IRON SERPL-MCNC: 91 UG/DL (ref 35–150)
POTASSIUM SERPL-SCNC: 3 MMOL/L (ref 3.5–5.1)
PROT SERPL-MCNC: 6 G/DL (ref 6.4–8.2)
PROTHROMBIN TIME: 14 SEC (ref 11.9–14.6)
SODIUM SERPL-SCNC: 141 MMOL/L (ref 136–145)
SP1: NORMAL
SP2: NORMAL
SP3: NORMAL
TIBC SERPL-MCNC: 109 UG/DL (ref 250–450)
VIT B12 SERPL-MCNC: >2000 PG/ML (ref 193–986)

## 2022-04-02 PROCEDURE — 71045 X-RAY EXAM CHEST 1 VIEW: CPT

## 2022-04-02 PROCEDURE — 74011000258 HC RX REV CODE- 258: Performed by: INTERNAL MEDICINE

## 2022-04-02 PROCEDURE — 74011000250 HC RX REV CODE- 250: Performed by: HOSPITALIST

## 2022-04-02 PROCEDURE — 36415 COLL VENOUS BLD VENIPUNCTURE: CPT

## 2022-04-02 PROCEDURE — 65270000029 HC RM PRIVATE

## 2022-04-02 PROCEDURE — 74011250636 HC RX REV CODE- 250/636: Performed by: HOSPITALIST

## 2022-04-02 PROCEDURE — 65610000006 HC RM INTENSIVE CARE

## 2022-04-02 PROCEDURE — 85610 PROTHROMBIN TIME: CPT

## 2022-04-02 PROCEDURE — 74011250637 HC RX REV CODE- 250/637: Performed by: INTERNAL MEDICINE

## 2022-04-02 PROCEDURE — 80053 COMPREHEN METABOLIC PANEL: CPT

## 2022-04-02 PROCEDURE — 74011250636 HC RX REV CODE- 250/636: Performed by: INTERNAL MEDICINE

## 2022-04-02 PROCEDURE — 74011000250 HC RX REV CODE- 250: Performed by: INTERNAL MEDICINE

## 2022-04-02 RX ORDER — HYDRALAZINE HYDROCHLORIDE 20 MG/ML
10 INJECTION INTRAMUSCULAR; INTRAVENOUS
Status: DISCONTINUED | OUTPATIENT
Start: 2022-04-02 | End: 2022-04-05 | Stop reason: HOSPADM

## 2022-04-02 RX ORDER — POTASSIUM CHLORIDE 7.45 MG/ML
10 INJECTION INTRAVENOUS
Status: COMPLETED | OUTPATIENT
Start: 2022-04-02 | End: 2022-04-02

## 2022-04-02 RX ADMIN — HYDRALAZINE HYDROCHLORIDE 10 MG: 20 INJECTION, SOLUTION INTRAMUSCULAR; INTRAVENOUS at 18:17

## 2022-04-02 RX ADMIN — POTASSIUM CHLORIDE 10 MEQ: 7.46 INJECTION, SOLUTION INTRAVENOUS at 11:09

## 2022-04-02 RX ADMIN — SODIUM CHLORIDE, PRESERVATIVE FREE 10 ML: 5 INJECTION INTRAVENOUS at 22:56

## 2022-04-02 RX ADMIN — HYDRALAZINE HYDROCHLORIDE 10 MG: 20 INJECTION, SOLUTION INTRAMUSCULAR; INTRAVENOUS at 02:03

## 2022-04-02 RX ADMIN — POTASSIUM CHLORIDE 10 MEQ: 7.46 INJECTION, SOLUTION INTRAVENOUS at 10:09

## 2022-04-02 RX ADMIN — FOLIC ACID 1 MG: 1 TABLET ORAL at 15:31

## 2022-04-02 RX ADMIN — POTASSIUM CHLORIDE 10 MEQ: 7.46 INJECTION, SOLUTION INTRAVENOUS at 12:09

## 2022-04-02 RX ADMIN — SODIUM CHLORIDE, PRESERVATIVE FREE 10 ML: 5 INJECTION INTRAVENOUS at 06:00

## 2022-04-02 RX ADMIN — THIAMINE HCL TAB 100 MG 100 MG: 100 TAB at 15:31

## 2022-04-02 RX ADMIN — DEXMEDETOMIDINE HYDROCHLORIDE 0.6 MCG/KG/HR: 4 INJECTION, SOLUTION INTRAVENOUS at 15:26

## 2022-04-02 RX ADMIN — DEXTROSE AND SODIUM CHLORIDE 75 ML/HR: 5; 900 INJECTION, SOLUTION INTRAVENOUS at 22:55

## 2022-04-02 RX ADMIN — DEXTROSE AND SODIUM CHLORIDE 75 ML/HR: 5; 900 INJECTION, SOLUTION INTRAVENOUS at 04:36

## 2022-04-02 RX ADMIN — DEXMEDETOMIDINE HYDROCHLORIDE 0.5 MCG/KG/HR: 4 INJECTION, SOLUTION INTRAVENOUS at 04:29

## 2022-04-02 RX ADMIN — MULTIPLE VITAMINS W/ MINERALS TAB 1 TABLET: TAB at 15:31

## 2022-04-02 RX ADMIN — SODIUM CHLORIDE, PRESERVATIVE FREE 10 ML: 5 INJECTION INTRAVENOUS at 15:29

## 2022-04-02 RX ADMIN — LORAZEPAM 2 MG: 2 INJECTION INTRAMUSCULAR; INTRAVENOUS at 06:25

## 2022-04-02 RX ADMIN — POTASSIUM CHLORIDE 10 MEQ: 7.46 INJECTION, SOLUTION INTRAVENOUS at 13:09

## 2022-04-02 RX ADMIN — HYDRALAZINE HYDROCHLORIDE 10 MG: 20 INJECTION, SOLUTION INTRAMUSCULAR; INTRAVENOUS at 10:09

## 2022-04-02 NOTE — PROGRESS NOTES
Hospitalist Progress Note               Daily Progress Note: 4/2/2022      Subjective:   Per admitting physician    42 y.o. male with remote history of seizures 3 years ago one time presents to the referring facility emergency room after he developed a seizure-like activity with fall and altered mental status weakness by the family. He denies any fever, chills. Denies any nausea or vomiting. Little more than 2 weeks ago on March 11 presented to the emergency room complaining of feeling dizzy and lightheaded when he was trying to move boxes. At that time on laboratory data he has mild metabolic acidosis and bilirubin was elevated at 8. Thought that he was may be drinking alcohol. Today on the laboratory data he has significantly elevated bilirubin, and due to seizure-like activity  and needing a neurological evaluation transferred here for further management.     Patient states that he works 5 days a week, on 2 days that he is off he drink alcohol. He drinks 40 ounce beer 2 cans daily only those 2 days. Denies any drinking heavy alcohol intake recently. He denies any drug abuse.     October 2021 he had COVID-19 infection happened. Currently he is not on any medications.    -----      Patient seen and examined at bedside, one-to-one sitter is present. Nursing notes reviewed. Patient is sedated on Precedex 0.6 mcg/kg/hr, following CIWA protocol due to alcohol withdrawal and altered mental status. Clearly, patient was unable to answer any questions due to sedation. Reviewing the CMP his ALT has remained stable his AST has improved to 192 from 222 however his alk phos remains high at 706. Concern by nursing staff regarding his respiration is reaching into the low 40s chest x-ray was ordered, it was unremarkable, likely due to alcohol withdrawal..   Ultrasound showed nonspecific bladder wall thickening essentially follow-up will be needed with either a HIDA scan or an MRCP once patient has improved.     Patient has a medical TDO which expires this afternoon        Problem List:  Problem List as of 2022 Date Reviewed: 3/31/2022          Codes Class Noted - Resolved    Hyperbilirubinemia ICD-10-CM: E80.6  ICD-9-CM: 782.4  3/31/2022 - Present        Seizure ([address] ICD-10-CM: R56.9  ICD-9-CM: 780.39  3/31/2022 - Present        Hepatitis, acute ICD-10-CM: B17.9  ICD-9-CM: 570  3/31/2022 - Present        Hypoxia ICD-10-CM: R09.02  ICD-9-CM: 799.02  10/7/2021 - Present        Positive D dimer ICD-10-CM: R79.89  ICD-9-CM: 790.92  10/7/2021 - Present        Pneumonia due to COVID-19 virus ICD-10-CM: U07.1, J12.82  ICD-9-CM: 480.8, 079.89  10/7/2021 - Present              Medications reviewed  Current Facility-Administered Medications   Medication Dose Route Frequency    hydrALAZINE (APRESOLINE) 20 mg/mL injection 10 mg  10 mg IntraVENous Q4H PRN    potassium chloride 10 mEq in 100 ml IVPB  10 mEq IntraVENous Q1H    LORazepam (ATIVAN) injection 2 mg  2 mg IntraVENous Q1H PRN    LORazepam (ATIVAN) injection 4 mg  4 mg IntraVENous Q1H PRN    dextrose 5% and 0.9% NaCl infusion  75 mL/hr IntraVENous CONTINUOUS    folic acid (FOLVITE) tablet 1 mg  1 mg Oral DAILY    thiamine mononitrate (B-1) tablet 100 mg  100 mg Oral DAILY    multivitamin, tx-iron-ca-min (THERA-M w/ IRON) tablet 1 Tablet  1 Tablet Oral DAILY    dexmedeTOMidine in 0.9 % NaCl (PRECEDEX) 400 mcg/100 mL (4 mcg/mL) infusion soln  0.1-1.5 mcg/kg/hr IntraVENous TITRATE    sodium chloride (NS) flush 5-40 mL  5-40 mL IntraVENous Q8H    sodium chloride (NS) flush 5-40 mL  5-40 mL IntraVENous PRN    ondansetron (ZOFRAN ODT) tablet 4 mg  4 mg Oral Q6H PRN    Or    ondansetron (ZOFRAN) injection 4 mg  4 mg IntraVENous Q6H PRN       Review of Systems:   Not required    Objective:   Physical Exam:     Visit Vitals  BP (!) 147/95   Pulse (!) 59   Temp 97.5 °F (36.4 °C)   Resp 21   SpO2 96%      O2 Device: None (Room air)    Temp (24hrs), Av.9 °F (36.1 °C), Min:96 °F (35.6 °C), Max:97.9 °F (36.6 °C)    No intake/output data recorded. 03/31 1901 - 04/02 0700  In: 1023.4 [I.V.:1023.4]  Out: 2350 [Urine:2350]      Physical Exam  Vitals and nursing note reviewed. Constitutional:       General: He is sleeping. Appearance: Normal appearance. He is normal weight. Interventions: He is sedated and restrained. Eyes:      General: Scleral icterus present. Cardiovascular:      Rate and Rhythm: Regular rhythm. Bradycardia present. Pulses: Normal pulses. Heart sounds: Normal heart sounds. Pulmonary:      Effort: Pulmonary effort is normal. Tachypnea present. Breath sounds: Normal breath sounds. Abdominal:      General: Abdomen is flat. Bowel sounds are normal. There is no distension. Palpations: Abdomen is soft. Tenderness: There is no abdominal tenderness. There is no guarding or rebound. Skin:     General: Skin is warm and dry. Data Review:       Recent Days:  Recent Labs     04/01/22  0621 03/31/22  1357   WBC 5.2 6.6   HGB 8.4* 10.4*   HCT 24.5* 31.2*   * 125*     Recent Labs     04/02/22  0350 04/01/22  1101 03/31/22  1357    133* 131*   K 3.0* 2.7* 4.6   * 98 92*   CO2 25 24 21   * 185* 147*   BUN 3* 3* 8   CREA 0.70 1.08 1.15   CA 7.9* 7.7* 7.9*   MG  --   --  1.4*   ALB 2.2* 2.2* 2.2*   TBILI 14.9* 17.7* 18.0*   ALT 62 62 75   INR 1.1  --  1.0     No results for input(s): PH, PCO2, PO2, HCO3, FIO2 in the last 72 hours.     24 Hour Results:  Recent Results (from the past 24 hour(s))   METABOLIC PANEL, COMPREHENSIVE    Collection Time: 04/01/22 11:01 AM   Result Value Ref Range    Sodium 133 (L) 136 - 145 mmol/L    Potassium 2.7 (LL) 3.5 - 5.1 mmol/L    Chloride 98 97 - 108 mmol/L    CO2 24 21 - 32 mmol/L    Anion gap 11 5 - 15 mmol/L    Glucose 185 (H) 65 - 100 mg/dL    BUN 3 (L) 6 - 20 mg/dL    Creatinine 1.08 0.70 - 1.30 mg/dL BUN/Creatinine ratio 3 (L) 12 - 20      GFR est AA >60 >60 ml/min/1.73m2    GFR est non-AA >60 >60 ml/min/1.73m2    Calcium 7.7 (L) 8.5 - 10.1 mg/dL    Bilirubin, total 17.7 (H) 0.2 - 1.0 mg/dL    AST (SGOT) 222 (H) 15 - 37 U/L    ALT (SGPT) 62 12 - 78 U/L    Alk. phosphatase 726 (H) 45 - 117 U/L    Protein, total 5.5 (L) 6.4 - 8.2 g/dL    Albumin 2.2 (L) 3.5 - 5.0 g/dL    Globulin 3.3 2.0 - 4.0 g/dL    A-G Ratio 0.7 (L) 1.1 - 2.2     METABOLIC PANEL, COMPREHENSIVE    Collection Time: 04/02/22  3:50 AM   Result Value Ref Range    Sodium 141 136 - 145 mmol/L    Potassium 3.0 (L) 3.5 - 5.1 mmol/L    Chloride 109 (H) 97 - 108 mmol/L    CO2 25 21 - 32 mmol/L    Anion gap 7 5 - 15 mmol/L    Glucose 171 (H) 65 - 100 mg/dL    BUN 3 (L) 6 - 20 mg/dL    Creatinine 0.70 0.70 - 1.30 mg/dL    BUN/Creatinine ratio 4 (L) 12 - 20      GFR est AA >60 >60 ml/min/1.73m2    GFR est non-AA >60 >60 ml/min/1.73m2    Calcium 7.9 (L) 8.5 - 10.1 mg/dL    Bilirubin, total 14.9 (H) 0.2 - 1.0 mg/dL    AST (SGOT) 192 (H) 15 - 37 U/L    ALT (SGPT) 62 12 - 78 U/L    Alk. phosphatase 706 (H) 45 - 117 U/L    Protein, total 6.0 (L) 6.4 - 8.2 g/dL    Albumin 2.2 (L) 3.5 - 5.0 g/dL    Globulin 3.8 2.0 - 4.0 g/dL    A-G Ratio 0.6 (L) 1.1 - 2.2     PROTHROMBIN TIME + INR    Collection Time: 04/02/22  3:50 AM   Result Value Ref Range    Prothrombin time 14.0 11.9 - 14.6 sec    INR 1.1 0.9 - 1.1         XR CHEST PORT   Final Result      US ABD LTD   Final Result      Nonspecific gallbladder wall thickening, differential diagnosis including   hepatic disease, cholecystitis. No gallstones seen. If there is clinical concern   for cholecystitis consider HIDA scan. Fatty liver. Assessment:    Cholestatic Jaundice likely due to advanced alcoholic liver disease. However, somewhat difficult to explain the elevated alkaline phosphatase.   Lack of prothrombin time elevation would also argue against hepatic failure    Seizure/History of Alcohol Use Disorder: likely a combination that resulted in the seizure from possible withdrawal.     History of COVID in October 2021, lungs are unremarkable patient is stable from this perspective. Macrocytic Anemia    Thrombocytopenia    Hyponatremia-resolved    Hypokalemia    Hypertension    Plan:  -Hepatitis panel negative  -Awaiting AFP, WILMA, HIV results  -Replete potassium  -Measure ins and outs  -Continue IV fluids, repeat CMP  -Continue CIWA protocol including Precedex  -Continue Ativan as needed for agitation  -Hydralazine as needed for a systolic blood pressure greater than 612 or diastolic blood pressure greater than 100.  -Continue current medications  - HIDA scan or MRCP likely in the future once he is medically stable      Care Plan discussed with: Patient/Family    Disposition: Continue current ICU treatment. Total time spent with patient: 30 minutes.     Akira Rogel MD

## 2022-04-02 NOTE — PROGRESS NOTES
Problem: Falls - Risk of  Goal: *Absence of Falls  Description: Document Sorin Fall Risk and appropriate interventions in the flowsheet.   Outcome: Progressing Towards Goal  Note: Fall Risk Interventions:  Mobility Interventions: Bed/chair exit alarm    Mentation Interventions: Bed/chair exit alarm    Medication Interventions: Bed/chair exit alarm    Elimination Interventions: Bed/chair exit alarm,Call light in reach    History of Falls Interventions: Bed/chair exit alarm         Problem: Patient Education: Go to Patient Education Activity  Goal: Patient/Family Education  Outcome: Progressing Towards Goal     Problem: General Medical Care Plan  Goal: *Vital signs within specified parameters  Outcome: Progressing Towards Goal  Goal: *Labs within defined limits  Outcome: Progressing Towards Goal  Goal: *Absence of infection signs and symptoms  Outcome: Progressing Towards Goal  Goal: *Optimal pain control at patient's stated goal  Outcome: Progressing Towards Goal  Goal: *Skin integrity maintained  Outcome: Progressing Towards Goal  Goal: *Fluid volume balance  Outcome: Progressing Towards Goal  Goal: *Optimize nutritional status  Outcome: Progressing Towards Goal  Goal: *Anxiety reduced or absent  Outcome: Progressing Towards Goal  Goal: *Progressive mobility and function (eg: ADL's)  Outcome: Progressing Towards Goal     Problem: Patient Education: Go to Patient Education Activity  Goal: Patient/Family Education  Outcome: Progressing Towards Goal     Problem: Non-Violent Restraints  Goal: Removal from restraints as soon as assessed to be safe  Outcome: Progressing Towards Goal  Goal: No harm/injury to patient while restraints in use  Outcome: Progressing Towards Goal  Goal: Patient's dignity will be maintained  Outcome: Progressing Towards Goal  Goal: Patient Interventions  Outcome: Progressing Towards Goal     Problem: Delirium Treatment  Goal: *Level of consciousness restored to baseline  Outcome: Progressing Towards Goal  Goal: *Level of environmental perceptions restored to baseline  Outcome: Progressing Towards Goal  Goal: *Sensory perception restored to baseline  Outcome: Progressing Towards Goal  Goal: *Emotional stability restored to baseline  Outcome: Progressing Towards Goal  Goal: *Functional assessment restored to baseline  Outcome: Progressing Towards Goal  Goal: *Absence of falls  Outcome: Progressing Towards Goal  Goal: *Will remain free of delirium, CAM Score negative  Outcome: Progressing Towards Goal  Goal: *Cognitive status will be restored to baseline  Outcome: Progressing Towards Goal  Goal: Interventions  Outcome: Progressing Towards Goal     Problem: Patient Education: Go to Patient Education Activity  Goal: Patient/Family Education  Outcome: Progressing Towards Goal

## 2022-04-02 NOTE — PROGRESS NOTES
2230- Pt awoke briefly, agitated and attempting to get out of bed. He apparently had to urinate. He voided 550mls, then immediately fell back to sleep. CIWA score currently 10, no intervention needed at this time as patient is sleeping. Iván wrist restraints remain intact. Sitter at the bedside. 4/2/22 0121- Spoke with  regarding patients blood pressures. New order for 10mg IV Hydralizine Q4hrs PRN for SBP >160 DBP >100.  0130- Patient placed on jessenia hugger, Axillary temp of 96.0, Patient sleeping and in no signs of distress. 7987- Patient woke up, agitated, attempted to get out of bed. Stating that he needs to sit up. Pulled off gown and male wick. Grew increasingly agitated as attempts were made to redirect him. CIWAA score 11 2mg Ativan administered per CIWAA protocol. Ativan effective. Patient relaxed. Axillary temp 98.4. Jessenia hugger turned off.   0700- patients respirations reaching  into the low 40s. Dr. Barbara Mandel contacted. New order given for chest xray. RR currently 25-26. Report given to oncoming nurse.

## 2022-04-02 NOTE — PROGRESS NOTES
Neurology Progress Note    Patient ID:  Terry Rain.  [id number]  89 y.o.  1980    Subjective: The patient was seen in follow-up this morning and he is in ICU due to ETOH withdrawal and he is on Precidex, but arousable. Patient is a 42 y.o. right handed -American male he was admitted overnight in transfer from a hospital in [facility] where he presented after a fainting spell followed by seizure activity at work. According to the patient this is his new job and he was feeling lightheaded and not good saw somebody for could sit down. He waited for 5 minutes and that made him feel better so he tried to go back to work and as he was walking this last thing he remembers. According to the nurse he was reportedly sweating profusely and his close were soaking wet and sweaty. Patient woke up in the ambulance and he was told that he had some seizure activity. Patient had been to the ER on March 11 with an episode of dizziness and lightheaded when he was trying to move some boxes. .    Current Facility-Administered Medications   Medication Dose Route Frequency    hydrALAZINE (APRESOLINE) 20 mg/mL injection 10 mg  10 mg IntraVENous Q4H PRN    LORazepam (ATIVAN) injection 2 mg  2 mg IntraVENous Q1H PRN    LORazepam (ATIVAN) injection 4 mg  4 mg IntraVENous Q1H PRN    dextrose 5% and 0.9% NaCl infusion  75 mL/hr IntraVENous CONTINUOUS    folic acid (FOLVITE) tablet 1 mg  1 mg Oral DAILY    thiamine mononitrate (B-1) tablet 100 mg  100 mg Oral DAILY    multivitamin, tx-iron-ca-min (THERA-M w/ IRON) tablet 1 Tablet  1 Tablet Oral DAILY    dexmedeTOMidine in 0.9 % NaCl (PRECEDEX) 400 mcg/100 mL (4 mcg/mL) infusion soln  0.1-1.5 mcg/kg/hr IntraVENous TITRATE    sodium chloride (NS) flush 5-40 mL  5-40 mL IntraVENous Q8H    sodium chloride (NS) flush 5-40 mL  5-40 mL IntraVENous PRN    ondansetron (ZOFRAN ODT) tablet 4 mg  4 mg Oral Q6H PRN    Or    ondansetron (ZOFRAN) injection 4 mg  4 mg IntraVENous Q6H PRN     Objective:     Patient Vitals for the past 8 hrs:   BP Temp Pulse Resp SpO2   04/02/22 1300 (!) 130/93  69 20 97 %   04/02/22 1200 123/89  71 17 97 %   04/02/22 1100 124/79  75 19 96 %   04/02/22 1015  99.6 °F (37.6 °C)      04/02/22 1009 (!) 158/116  66     04/02/22 1000 118/84  76 19 96 %   04/02/22 0900 (!) 162/108  69 20 97 %   04/02/22 0800 (!) 165/104  71 19 97 %   04/02/22 0700 (!) 146/98  77 24 97 %     04/02 0701 - 04/02 1900  In: -   Out: 600 [Urine:600]  03/31 1901 - 04/02 0700  In: 1023.4 [I.V.:1023.4]  Out: 2350 [Urine:2350]    Lab Review   Recent Results (from the past 24 hour(s))   HIV 1/2 AG/AB, 4TH GENERATION,W RFLX CONFIRM    Collection Time: 04/01/22  3:40 PM   Result Value Ref Range    HIV 1/2 Interpretation NONREACTIVE NONREACTIVE    HIV 1/2 result comment SEE NOTE     METABOLIC PANEL, COMPREHENSIVE    Collection Time: 04/02/22  3:50 AM   Result Value Ref Range    Sodium 141 136 - 145 mmol/L    Potassium 3.0 (L) 3.5 - 5.1 mmol/L    Chloride 109 (H) 97 - 108 mmol/L    CO2 25 21 - 32 mmol/L    Anion gap 7 5 - 15 mmol/L    Glucose 171 (H) 65 - 100 mg/dL    BUN 3 (L) 6 - 20 mg/dL    Creatinine 0.70 0.70 - 1.30 mg/dL    BUN/Creatinine ratio 4 (L) 12 - 20      GFR est AA >60 >60 ml/min/1.73m2    GFR est non-AA >60 >60 ml/min/1.73m2    Calcium 7.9 (L) 8.5 - 10.1 mg/dL    Bilirubin, total 14.9 (H) 0.2 - 1.0 mg/dL    AST (SGOT) 192 (H) 15 - 37 U/L    ALT (SGPT) 62 12 - 78 U/L    Alk. phosphatase 706 (H) 45 - 117 U/L    Protein, total 6.0 (L) 6.4 - 8.2 g/dL    Albumin 2.2 (L) 3.5 - 5.0 g/dL    Globulin 3.8 2.0 - 4.0 g/dL    A-G Ratio 0.6 (L) 1.1 - 2.2     PROTHROMBIN TIME + INR    Collection Time: 04/02/22  3:50 AM   Result Value Ref Range    Prothrombin time 14.0 11.9 - 14.6 sec    INR 1.1 0.9 - 1.1       Additional comments: None    NEUROLOGICAL EXAM:  Appearance: The patient is well developed, well nourished, and is in no acute distress.    Mental Status: Oriented to time, place and person. Mood and affect appropriate. Cranial Nerves:   Intact visual fields. TORRI, EOM's full, no nystagmus, no ptosis. Facial movement is symmetric. Hearing is normal bilaterally. Motor:  5/5 strength in upper and lower proximal and distal muscles. Normal bulk and tone. Reflexes:   Deep tendon reflexes 1/4 and symmetrical.   Sensory:   Responds to pain. Gait:  Deffered. Tremor:   No tremor noted. Cerebellar:  Not checked. Neurovascular:  Normal heart sounds and regular rhythm. Assessment:   1. Syncopal episode followed by seizure activity: On the basis of the history seems like patient had a syncopal episode preceded by lightheadedness excessive sweating. His abnormal liver functions and alcohol dependence could have contributed to the episode. I doubt any primary etiology for the seizure and will not recommend any neurological work-up at this point. I also agree with not starting any antiepileptic medicines. 2.  Abnormal liver function with very high bilirubin 18: Patient's CT scan showed some fatty liver with swelling. In the light of abnormal enzymes, agree with the GI consult and their work-up. 3.  History of seizures 3 years ago but none since: Seems like related to his alcohol dependence. Plan:   1. Continue with ETOH withdrawal protocol.     Thank you,    Signed:  Stalin Leavitt MD  4/2/2022  2:52 PM

## 2022-04-02 NOTE — CONSULTS
Consult    Patient: Eloisa Person MRN: [id number]  SSN: [id number]    YOB: 1980  Age: 42 y.o. Sex: male      Subjective:      Eloisa Person is a 42 y.o. male who is being seen for abnormal liver function test, abnormal CT  Patient confused, history from medical records  . Patient was transferred from outside facility for  a seizure-like activity with fall and altered mental status weakness by the family. feeling dizzy and lightheaded when he was trying to move boxes. Er laboratory data indicating significantly elevated bilirubin, and elevated transaminase due to seizure-like activity  , patient was confused today, not able to give any history, however no new seizure activity since admission     Past Medical History:   Diagnosis Date    Seizure [facility])      History reviewed. No pertinent surgical history.    Family History   Problem Relation Age of Onset    No Known Problems Mother     No Known Problems Father      Social History     Tobacco Use    Smoking status: Current Every Day Smoker     Packs/day: 0.25    Smokeless tobacco: Never Used   Substance Use Topics    Alcohol use: Yes     Comment: occasionally       Current Facility-Administered Medications   Medication Dose Route Frequency Provider Last Rate Last Admin    LORazepam (ATIVAN) injection 2 mg  2 mg IntraVENous Q1H PRN Taz Verduzco MD        LORazepam (ATIVAN) injection 4 mg  4 mg IntraVENous Q1H PRN Taz Verduzco MD        dextrose 5% and 0.9% NaCl infusion  75 mL/hr IntraVENous CONTINUOUS Taz Verduzco MD 75 mL/hr at 04/01/22 1552 75 mL/hr at 04/01/22 1552    [START ON 7/4/5390] folic acid (FOLVITE) tablet 1 mg  1 mg Oral DAILY Taz Verduzco MD        [START ON 4/2/2022] thiamine mononitrate (B-1) tablet 100 mg  100 mg Oral DAILY Taz Verduzco MD        [START ON 4/2/2022] multivitamin, tx-iron-ca-min (THERA-M w/ IRON) tablet 1 Tablet  1 Tablet Oral DAILY MD Yin Groves dexmedeTOMidine in 0.9 % NaCl (PRECEDEX) 400 mcg/100 mL (4 mcg/mL) infusion soln  0.1-1.5 mcg/kg/hr IntraVENous TITRATE Moncho Marrero MD 8.2 mL/hr at 04/01/22 2229 0.5 mcg/kg/hr at 04/01/22 2229    sodium chloride (NS) flush 5-40 mL  5-40 mL IntraVENous Q8H Anne-Marie Sue MD   10 mL at 04/01/22 1325    sodium chloride (NS) flush 5-40 mL  5-40 mL IntraVENous PRN Anne-Marie Sue MD        ondansetron (ZOFRAN ODT) tablet 4 mg  4 mg Oral Q6H PRN Anne-Marie Sue MD        Or    ondansetron [facility]) injection 4 mg  4 mg IntraVENous Q6H PRN Anne-Marie Sue MD            No Known Allergies    Review of Systems:  Review of Systems   Unable to perform ROS: Mental acuity        Objective:     Vitals:    04/01/22 0400 04/01/22 0807 04/01/22 1642 04/01/22 1830   BP:  135/87 (!) 141/86 (!) 141/96   Pulse: 64 94 100 73   Resp:  14 16 18   Temp:  98.5 °F (36.9 °C) 97.9 °F (36.6 °C)    SpO2:  97% 98% 99%        Physical Exam:  Physical Exam  Constitutional:       Appearance: He is ill-appearing. HENT:      Head: Atraumatic. Mouth/Throat:      Mouth: Mucous membranes are dry. Eyes:      General: Scleral icterus present. Extraocular Movements: Extraocular movements intact. Cardiovascular:      Rate and Rhythm: Tachycardia present. Pulses: Normal pulses. Pulmonary:      Effort: Pulmonary effort is normal.      Breath sounds: Normal breath sounds. Abdominal:      General: Abdomen is flat. Tenderness: There is abdominal tenderness. Musculoskeletal:      Cervical back: Neck supple. Skin:     General: Skin is warm. Neurological:      Mental Status: Mental status is at baseline. Motor: Weakness present.           Recent Results (from the past 24 hour(s))   CBC WITH AUTOMATED DIFF    Collection Time: 04/01/22  6:21 AM   Result Value Ref Range    WBC 5.2 4.1 - 11.1 K/uL    RBC 2.43 (L) 4.10 - 5.70 M/uL    HGB 8.4 (L) 12.1 - 17.0 g/dL    HCT 24.5 (L) 36.6 - 50.3 % .8 (H) 80.0 - 99.0 FL    MCH 34.6 (H) 26.0 - 34.0 PG    MCHC 34.3 30.0 - 36.5 g/dL    RDW 19.5 (H) 11.5 - 14.5 %    PLATELET 857 (L) 888 - 400 K/uL    MPV 12.0 8.9 - 12.9 FL    NRBC 0.4 (H) 0.0  WBC    ABSOLUTE NRBC 0.02 (H) 0.00 - 0.01 K/uL    NEUTROPHILS 88 (H) 32 - 75 %    LYMPHOCYTES 11 (L) 12 - 49 %    MONOCYTES 1 (L) 5 - 13 %    EOSINOPHILS 0 0 - 7 %    BASOPHILS 0 0 - 1 %    IMMATURE GRANULOCYTES 0 %    ABS. NEUTROPHILS 4.5 1.8 - 8.0 K/UL    ABS. LYMPHOCYTES 0.6 (L) 0.8 - 3.5 K/UL    ABS. MONOCYTES 0.1 0.0 - 1.0 K/UL    ABS. EOSINOPHILS 0.0 0.0 - 0.4 K/UL    ABS. BASOPHILS 0.0 0.0 - 0.1 K/UL    ABS. IMM. GRANS. 0.0 K/UL    DF Smear Scanned      RBC COMMENTS Anisocytosis  1+        RBC COMMENTS Macrocytosis  1+       SED RATE (ESR)    Collection Time: 04/01/22  6:21 AM   Result Value Ref Range    Sed rate, automated 63 (H) 0 - 15 mm/hr   METABOLIC PANEL, COMPREHENSIVE    Collection Time: 04/01/22 11:01 AM   Result Value Ref Range    Sodium 133 (L) 136 - 145 mmol/L    Potassium 2.7 (LL) 3.5 - 5.1 mmol/L    Chloride 98 97 - 108 mmol/L    CO2 24 21 - 32 mmol/L    Anion gap 11 5 - 15 mmol/L    Glucose 185 (H) 65 - 100 mg/dL    BUN 3 (L) 6 - 20 mg/dL    Creatinine 1.08 0.70 - 1.30 mg/dL    BUN/Creatinine ratio 3 (L) 12 - 20      GFR est AA >60 >60 ml/min/1.73m2    GFR est non-AA >60 >60 ml/min/1.73m2    Calcium 7.7 (L) 8.5 - 10.1 mg/dL    Bilirubin, total 17.7 (H) 0.2 - 1.0 mg/dL    AST (SGOT) 222 (H) 15 - 37 U/L    ALT (SGPT) 62 12 - 78 U/L    Alk. phosphatase 726 (H) 45 - 117 U/L    Protein, total 5.5 (L) 6.4 - 8.2 g/dL    Albumin 2.2 (L) 3.5 - 5.0 g/dL    Globulin 3.3 2.0 - 4.0 g/dL    A-G Ratio 0.7 (L) 1.1 - 2.2          US ABD LTD   Final Result      Nonspecific gallbladder wall thickening, differential diagnosis including   hepatic disease, cholecystitis. No gallstones seen. If there is clinical concern   for cholecystitis consider HIDA scan. Fatty liver.          Assessment:     Hospital Problems  Date Reviewed: 3/31/2022          Codes Class Noted POA    Hyperbilirubinemia ICD-10-CM: E80.6  ICD-9-CM: 782.4  3/31/2022 Yes        Seizure ([address] ICD-10-CM: R56.9  ICD-9-CM: 780.39  3/31/2022 Yes        Hepatitis, acute ICD-10-CM: B17.9  ICD-9-CM: 189  3/31/2022 Unknown              Severe jaundice likely   on alcoholic liver disease. Alcoholic fatty liver disease   had a seizure activity, seizure medicine side effect,   CT suggest possible cholecystitis although patient has no symptoms or abdominal tenderness.   CBD appears normal  No mass or other Occult malignancy noted on CT in the liver, pancreas, gallbladder,     Alcohol related seizure,  Increase of edema on the mucosa and colon, however secondary to the inflammatory process  Plan:   Hepatitis panel,jonathan IV has been ordered  PT/INR has been ordered   Follow the liver function test  HIDA scan may not helpful due to the severe hyper ilirubinemia  We will follow the patient with you to see further GI study is  needed  Signed By: Ten Flores MD     April 1, 2022         Thank you for allowing me to participate in this patients care  Cc Referring Physician   None

## 2022-04-02 NOTE — PROGRESS NOTES
Hospitalist Progress Note               Daily Progress Note: 4/2/2022      Subjective:   Per admitting physician    42 y.o. male with remote history of seizures 3 years ago one time presents to the referring facility emergency room after he developed a seizure-like activity with fall and altered mental status weakness by the family. He denies any fever, chills. Denies any nausea or vomiting. Little more than 2 weeks ago on March 11 presented to the emergency room complaining of feeling dizzy and lightheaded when he was trying to move boxes. At that time on laboratory data he has mild metabolic acidosis and bilirubin was elevated at 8. Thought that he was may be drinking alcohol. Today on the laboratory data he has significantly elevated bilirubin, and due to seizure-like activity  and needing a neurological evaluation transferred here for further management.     Patient states that he works 5 days a week, on 2 days that he is off he drink alcohol. He drinks 40 ounce beer 2 cans daily only those 2 days. Denies any drinking heavy alcohol intake recently. He denies any drug abuse.     October 2021 he had COVID-19 infection happened. Currently he is not on any medications.    -----      Patient seen and examined at bedside, one-to-one sitter is present. Nursing notes reviewed. Patient is sedated on Precedex 0.6 mcg/kg/hr, following CIWA protocol due to alcohol withdrawal and altered mental status. Clearly, patient was unable to answer any questions due to sedation. Reviewing the CMP his ALT has remained stable his AST has improved to 192 from 222 however his alk phos remains high at 706. Concern by nursing staff regarding his respiration is reaching into the low 40s chest x-ray was ordered, it was unremarkable, likely due to alcohol withdrawal..   Ultrasound showed nonspecific bladder wall thickening essentially follow-up will be needed with either a HIDA scan or an MRCP patient has improved. Problem List:  Problem List as of 2022 Date Reviewed: 3/31/2022          Codes Class Noted - Resolved    Hyperbilirubinemia ICD-10-CM: E80.6  ICD-9-CM: 782.4  3/31/2022 - Present        Seizure ([address] ICD-10-CM: R56.9  ICD-9-CM: 780.39  3/31/2022 - Present        Hepatitis, acute ICD-10-CM: B17.9  ICD-9-CM: 570  3/31/2022 - Present        Hypoxia ICD-10-CM: R09.02  ICD-9-CM: 799.02  10/7/2021 - Present        Positive D dimer ICD-10-CM: R79.89  ICD-9-CM: 790.92  10/7/2021 - Present        Pneumonia due to COVID-19 virus ICD-10-CM: U07.1, J12.82  ICD-9-CM: 480.8, 079.89  10/7/2021 - Present              Medications reviewed  Current Facility-Administered Medications   Medication Dose Route Frequency    hydrALAZINE (APRESOLINE) 20 mg/mL injection 10 mg  10 mg IntraVENous Q4H PRN    LORazepam (ATIVAN) injection 2 mg  2 mg IntraVENous Q1H PRN    LORazepam (ATIVAN) injection 4 mg  4 mg IntraVENous Q1H PRN    dextrose 5% and 0.9% NaCl infusion  75 mL/hr IntraVENous CONTINUOUS    folic acid (FOLVITE) tablet 1 mg  1 mg Oral DAILY    thiamine mononitrate (B-1) tablet 100 mg  100 mg Oral DAILY    multivitamin, tx-iron-ca-min (THERA-M w/ IRON) tablet 1 Tablet  1 Tablet Oral DAILY    dexmedeTOMidine in 0.9 % NaCl (PRECEDEX) 400 mcg/100 mL (4 mcg/mL) infusion soln  0.1-1.5 mcg/kg/hr IntraVENous TITRATE    sodium chloride (NS) flush 5-40 mL  5-40 mL IntraVENous Q8H    sodium chloride (NS) flush 5-40 mL  5-40 mL IntraVENous PRN    ondansetron (ZOFRAN ODT) tablet 4 mg  4 mg Oral Q6H PRN    Or    ondansetron (ZOFRAN) injection 4 mg  4 mg IntraVENous Q6H PRN       Review of Systems:   Not required    Objective:   Physical Exam:     Visit Vitals  BP (!) 147/95   Pulse (!) 59   Temp 97.5 °F (36.4 °C)   Resp 21   SpO2 96%      O2 Device: None (Room air)    Temp (24hrs), Av.2 °F (36.2 °C), Min:96 °F (35.6 °C), Max:98.5 °F (36.9 °C)    No intake/output data recorded.     1901 -  0700  In: 1023.4 [I.V.:1023.4]  Out: 2350 [Urine:2350]      Physical Exam  Vitals and nursing note reviewed. Constitutional:       General: He is sleeping. Appearance: Normal appearance. He is normal weight. Interventions: He is sedated and restrained. Eyes:      General: Scleral icterus present. Cardiovascular:      Rate and Rhythm: Regular rhythm. Bradycardia present. Pulses: Normal pulses. Heart sounds: Normal heart sounds. Pulmonary:      Effort: Pulmonary effort is normal. Tachypnea present. Breath sounds: Normal breath sounds. Abdominal:      General: Abdomen is flat. Bowel sounds are normal. There is no distension. Palpations: Abdomen is soft. Tenderness: There is no abdominal tenderness. There is no guarding or rebound. Skin:     General: Skin is warm and dry. Data Review:       Recent Days:  Recent Labs     04/01/22  0621 03/31/22  1357   WBC 5.2 6.6   HGB 8.4* 10.4*   HCT 24.5* 31.2*   * 125*     Recent Labs     04/02/22  0350 04/01/22  1101 03/31/22  1357    133* 131*   K 3.0* 2.7* 4.6   * 98 92*   CO2 25 24 21   * 185* 147*   BUN 3* 3* 8   CREA 0.70 1.08 1.15   CA 7.9* 7.7* 7.9*   MG  --   --  1.4*   ALB 2.2* 2.2* 2.2*   TBILI 14.9* 17.7* 18.0*   ALT 62 62 75   INR 1.1  --  1.0     No results for input(s): PH, PCO2, PO2, HCO3, FIO2 in the last 72 hours.     24 Hour Results:  Recent Results (from the past 24 hour(s))   METABOLIC PANEL, COMPREHENSIVE    Collection Time: 04/01/22 11:01 AM   Result Value Ref Range    Sodium 133 (L) 136 - 145 mmol/L    Potassium 2.7 (LL) 3.5 - 5.1 mmol/L    Chloride 98 97 - 108 mmol/L    CO2 24 21 - 32 mmol/L    Anion gap 11 5 - 15 mmol/L    Glucose 185 (H) 65 - 100 mg/dL    BUN 3 (L) 6 - 20 mg/dL    Creatinine 1.08 0.70 - 1.30 mg/dL    BUN/Creatinine ratio 3 (L) 12 - 20      GFR est AA >60 >60 ml/min/1.73m2    GFR est non-AA >60 >60 ml/min/1.73m2    Calcium 7.7 (L) 8.5 - 10.1 mg/dL    Bilirubin, total 17.7 (H) 0.2 - 1.0 mg/dL    AST (SGOT) 222 (H) 15 - 37 U/L    ALT (SGPT) 62 12 - 78 U/L    Alk. phosphatase 726 (H) 45 - 117 U/L    Protein, total 5.5 (L) 6.4 - 8.2 g/dL    Albumin 2.2 (L) 3.5 - 5.0 g/dL    Globulin 3.3 2.0 - 4.0 g/dL    A-G Ratio 0.7 (L) 1.1 - 2.2     METABOLIC PANEL, COMPREHENSIVE    Collection Time: 04/02/22  3:50 AM   Result Value Ref Range    Sodium 141 136 - 145 mmol/L    Potassium 3.0 (L) 3.5 - 5.1 mmol/L    Chloride 109 (H) 97 - 108 mmol/L    CO2 25 21 - 32 mmol/L    Anion gap 7 5 - 15 mmol/L    Glucose 171 (H) 65 - 100 mg/dL    BUN 3 (L) 6 - 20 mg/dL    Creatinine 0.70 0.70 - 1.30 mg/dL    BUN/Creatinine ratio 4 (L) 12 - 20      GFR est AA >60 >60 ml/min/1.73m2    GFR est non-AA >60 >60 ml/min/1.73m2    Calcium 7.9 (L) 8.5 - 10.1 mg/dL    Bilirubin, total 14.9 (H) 0.2 - 1.0 mg/dL    AST (SGOT) 192 (H) 15 - 37 U/L    ALT (SGPT) 62 12 - 78 U/L    Alk. phosphatase 706 (H) 45 - 117 U/L    Protein, total 6.0 (L) 6.4 - 8.2 g/dL    Albumin 2.2 (L) 3.5 - 5.0 g/dL    Globulin 3.8 2.0 - 4.0 g/dL    A-G Ratio 0.6 (L) 1.1 - 2.2     PROTHROMBIN TIME + INR    Collection Time: 04/02/22  3:50 AM   Result Value Ref Range    Prothrombin time 14.0 11.9 - 14.6 sec    INR 1.1 0.9 - 1.1         XR CHEST PORT   Final Result      US ABD LTD   Final Result      Nonspecific gallbladder wall thickening, differential diagnosis including   hepatic disease, cholecystitis. No gallstones seen. If there is clinical concern   for cholecystitis consider HIDA scan. Fatty liver. Assessment:    Cholestatic Jaundice likely due to  superimposed on alcoholic liver disease    Seizure/History of Alcohol Use Disorder: likely a combination that resulted in the seizure from possible withdrawal.     History of COVID in October 2021, lungs are unremarkable patient is stable from this perspective.     Macrocytic Anemia    Thrombocytopenia    Hyponatremia-resolved    Hypokalemia    Hypertension    Plan:  -Hepatitis panel negative  -Awaiting AFP, WILMA, HIV results  -Replete potassium  -Measure ins and outs  -Continue IV fluids, repeat CMP  -Continue CIWA protocol  -Continue Ativan as needed for agitation  -Hydralazine as needed for a systolic blood pressure greater than 894 or diastolic blood pressure greater than 100.  -Continue current medications  - HIDA scan or MRCP likely in the future      Care Plan discussed with: Patient/Family    Disposition: Continue current inpatient treatment. Total time spent with patient: 30 minutes. Beny Ramirez  *ATTENTION:  This note has been created by a medical student for educational purposes only. Please do not refer to the content of this note for clinical decision-making, billing, or other purposes. Please see attending physicians note to obtain clinical information on this patient. *

## 2022-04-03 LAB
ALBUMIN SERPL-MCNC: 1.9 G/DL (ref 3.5–5)
ALBUMIN SERPL-MCNC: 1.9 G/DL (ref 3.5–5)
ALBUMIN/GLOB SERPL: 0.5 {RATIO} (ref 1.1–2.2)
ALP SERPL-CCNC: 605 U/L (ref 45–117)
ALT SERPL-CCNC: 56 U/L (ref 12–78)
ANION GAP SERPL CALC-SCNC: 10 MMOL/L (ref 5–15)
AST SERPL W P-5'-P-CCNC: 164 U/L (ref 15–37)
BASOPHILS # BLD: 0.1 K/UL (ref 0–0.1)
BASOPHILS NFR BLD: 2 % (ref 0–1)
BILIRUB DIRECT SERPL-MCNC: 9 MG/DL (ref 0–0.2)
BILIRUB SERPL-MCNC: 11.2 MG/DL (ref 0.2–1)
BUN SERPL-MCNC: 5 MG/DL (ref 6–20)
BUN/CREAT SERPL: 7 (ref 12–20)
CA-I BLD-MCNC: 7.8 MG/DL (ref 8.5–10.1)
CHLORIDE SERPL-SCNC: 106 MMOL/L (ref 97–108)
CO2 SERPL-SCNC: 22 MMOL/L (ref 21–32)
CREAT SERPL-MCNC: 0.69 MG/DL (ref 0.7–1.3)
DIFFERENTIAL METHOD BLD: ABNORMAL
EOSINOPHIL # BLD: 0 K/UL (ref 0–0.4)
EOSINOPHIL NFR BLD: 0 % (ref 0–7)
ERYTHROCYTE [DISTWIDTH] IN BLOOD BY AUTOMATED COUNT: 21 % (ref 11.5–14.5)
GLOBULIN SER CALC-MCNC: 3.7 G/DL (ref 2–4)
GLUCOSE SERPL-MCNC: 113 MG/DL (ref 65–100)
HCT VFR BLD AUTO: 24.3 % (ref 36.6–50.3)
HGB BLD-MCNC: 8.2 G/DL (ref 12.1–17)
IMM GRANULOCYTES # BLD AUTO: 0 K/UL
IMM GRANULOCYTES NFR BLD AUTO: 0 %
INR PPP: 1 (ref 0.9–1.1)
LYMPHOCYTES # BLD: 1.3 K/UL (ref 0.8–3.5)
LYMPHOCYTES NFR BLD: 29 % (ref 12–49)
MCH RBC QN AUTO: 35 PG (ref 26–34)
MCHC RBC AUTO-ENTMCNC: 33.7 G/DL (ref 30–36.5)
MCV RBC AUTO: 103.8 FL (ref 80–99)
MONOCYTES # BLD: 0.2 K/UL (ref 0–1)
MONOCYTES NFR BLD: 4 % (ref 5–13)
MYELOCYTES NFR BLD MANUAL: 6 %
NEUTS SEG # BLD: 2.6 K/UL (ref 1.8–8)
NEUTS SEG NFR BLD: 59 % (ref 32–75)
NRBC # BLD: 1.21 K/UL
NRBC # BLD: 1.32 K/UL (ref 0–0.01)
NRBC BLD MANUAL-RTO: 27 PER 100 WBC
NRBC BLD-RTO: 23.2 PER 100 WBC
PHOSPHATE SERPL-MCNC: 1.9 MG/DL (ref 2.6–4.7)
PLATELET # BLD AUTO: 138 K/UL (ref 150–400)
PMV BLD AUTO: 11 FL (ref 8.9–12.9)
POTASSIUM SERPL-SCNC: 2.7 MMOL/L (ref 3.5–5.1)
PROT SERPL-MCNC: 5.6 G/DL (ref 6.4–8.2)
PROTHROMBIN TIME: 13.1 SEC (ref 11.9–14.6)
RBC # BLD AUTO: 2.34 M/UL (ref 4.1–5.7)
RBC MORPH BLD: ABNORMAL
SODIUM SERPL-SCNC: 138 MMOL/L (ref 136–145)
WBC # BLD AUTO: 4.5 K/UL (ref 4.1–11.1)

## 2022-04-03 PROCEDURE — 80076 HEPATIC FUNCTION PANEL: CPT

## 2022-04-03 PROCEDURE — 85025 COMPLETE CBC W/AUTO DIFF WBC: CPT

## 2022-04-03 PROCEDURE — 74011000258 HC RX REV CODE- 258: Performed by: INTERNAL MEDICINE

## 2022-04-03 PROCEDURE — 80069 RENAL FUNCTION PANEL: CPT

## 2022-04-03 PROCEDURE — 74011000250 HC RX REV CODE- 250: Performed by: INTERNAL MEDICINE

## 2022-04-03 PROCEDURE — 74011250637 HC RX REV CODE- 250/637: Performed by: INTERNAL MEDICINE

## 2022-04-03 PROCEDURE — 85610 PROTHROMBIN TIME: CPT

## 2022-04-03 PROCEDURE — 74011250636 HC RX REV CODE- 250/636: Performed by: INTERNAL MEDICINE

## 2022-04-03 PROCEDURE — 74011000250 HC RX REV CODE- 250: Performed by: HOSPITALIST

## 2022-04-03 PROCEDURE — 36415 COLL VENOUS BLD VENIPUNCTURE: CPT

## 2022-04-03 PROCEDURE — 74011250637 HC RX REV CODE- 250/637: Performed by: HOSPITALIST

## 2022-04-03 PROCEDURE — 65610000006 HC RM INTENSIVE CARE

## 2022-04-03 PROCEDURE — 65270000029 HC RM PRIVATE

## 2022-04-03 RX ORDER — POTASSIUM CHLORIDE 750 MG/1
40 TABLET, FILM COATED, EXTENDED RELEASE ORAL EVERY 4 HOURS
Status: COMPLETED | OUTPATIENT
Start: 2022-04-03 | End: 2022-04-03

## 2022-04-03 RX ORDER — IBUPROFEN 200 MG
1 TABLET ORAL DAILY
Status: DISCONTINUED | OUTPATIENT
Start: 2022-04-03 | End: 2022-04-05 | Stop reason: HOSPADM

## 2022-04-03 RX ORDER — MELATONIN
2000 DAILY
Status: DISCONTINUED | OUTPATIENT
Start: 2022-04-03 | End: 2022-04-05 | Stop reason: HOSPADM

## 2022-04-03 RX ORDER — MAGNESIUM SULFATE HEPTAHYDRATE 40 MG/ML
2 INJECTION, SOLUTION INTRAVENOUS ONCE
Status: COMPLETED | OUTPATIENT
Start: 2022-04-03 | End: 2022-04-03

## 2022-04-03 RX ORDER — POTASSIUM CHLORIDE 20 MEQ/1
40 TABLET, EXTENDED RELEASE ORAL
Status: DISPENSED | OUTPATIENT
Start: 2022-04-03 | End: 2022-04-03

## 2022-04-03 RX ADMIN — POTASSIUM CHLORIDE 40 MEQ: 20 TABLET, EXTENDED RELEASE ORAL at 16:01

## 2022-04-03 RX ADMIN — POTASSIUM CHLORIDE 40 MEQ: 750 TABLET, FILM COATED, EXTENDED RELEASE ORAL at 09:08

## 2022-04-03 RX ADMIN — SODIUM CHLORIDE, PRESERVATIVE FREE 10 ML: 5 INJECTION INTRAVENOUS at 05:36

## 2022-04-03 RX ADMIN — SODIUM CHLORIDE, PRESERVATIVE FREE 10 ML: 5 INJECTION INTRAVENOUS at 22:15

## 2022-04-03 RX ADMIN — MULTIPLE VITAMINS W/ MINERALS TAB 1 TABLET: TAB at 09:09

## 2022-04-03 RX ADMIN — DEXMEDETOMIDINE HYDROCHLORIDE 0.4 MCG/KG/HR: 4 INJECTION, SOLUTION INTRAVENOUS at 13:56

## 2022-04-03 RX ADMIN — THIAMINE HCL TAB 100 MG 100 MG: 100 TAB at 09:08

## 2022-04-03 RX ADMIN — DEXTROSE AND SODIUM CHLORIDE 75 ML/HR: 5; 900 INJECTION, SOLUTION INTRAVENOUS at 14:24

## 2022-04-03 RX ADMIN — FOLIC ACID 1 MG: 1 TABLET ORAL at 09:08

## 2022-04-03 RX ADMIN — DEXMEDETOMIDINE HYDROCHLORIDE 0.5 MCG/KG/HR: 4 INJECTION, SOLUTION INTRAVENOUS at 01:12

## 2022-04-03 RX ADMIN — MAGNESIUM SULFATE HEPTAHYDRATE 2 G: 40 INJECTION, SOLUTION INTRAVENOUS at 09:09

## 2022-04-03 RX ADMIN — Medication 2000 UNITS: at 09:14

## 2022-04-03 RX ADMIN — POTASSIUM CHLORIDE 40 MEQ: 20 TABLET, EXTENDED RELEASE ORAL at 11:31

## 2022-04-03 RX ADMIN — POTASSIUM CHLORIDE 40 MEQ: 20 TABLET, EXTENDED RELEASE ORAL at 12:52

## 2022-04-03 RX ADMIN — POTASSIUM CHLORIDE 40 MEQ: 750 TABLET, FILM COATED, EXTENDED RELEASE ORAL at 05:29

## 2022-04-03 RX ADMIN — SODIUM CHLORIDE, PRESERVATIVE FREE 10 ML: 5 INJECTION INTRAVENOUS at 13:56

## 2022-04-03 NOTE — PROGRESS NOTES
Neurology Progress Note    Patient ID:  Sonia Ayala.  [id number]  15 y.o.  1980    Subjective: The patient was seen in follow-up this morning and he is more awake and responsive and answers the questions appropriately. Patient is a 42 y.o. right handed -American male he was admitted overnight in transfer from a hospital in [facility] where he presented after a fainting spell followed by seizure activity at work. According to the patient this is his new job and he was feeling lightheaded and not good saw somebody for could sit down. He waited for 5 minutes and that made him feel better so he tried to go back to work and as he was walking this last thing he remembers. According to the nurse he was reportedly sweating profusely and his close were soaking wet and sweaty. Patient woke up in the ambulance and he was told that he had some seizure activity. Patient had been to the ER on March 11 with an episode of dizziness and lightheaded when he was trying to move some boxes. .    Current Facility-Administered Medications   Medication Dose Route Frequency    nicotine (NICODERM CQ) 21 mg/24 hr patch 1 Patch  1 Patch TransDERmal DAILY    potassium chloride (K-DUR, KLOR-CON M20) SR tablet 40 mEq  40 mEq Oral Q2H    cholecalciferol (VITAMIN D3) (1000 Units /25 mcg) tablet 2,000 Units  2,000 Units Oral DAILY    hydrALAZINE (APRESOLINE) 20 mg/mL injection 10 mg  10 mg IntraVENous Q4H PRN    LORazepam (ATIVAN) injection 2 mg  2 mg IntraVENous Q1H PRN    LORazepam (ATIVAN) injection 4 mg  4 mg IntraVENous Q1H PRN    dextrose 5% and 0.9% NaCl infusion  75 mL/hr IntraVENous CONTINUOUS    folic acid (FOLVITE) tablet 1 mg  1 mg Oral DAILY    thiamine mononitrate (B-1) tablet 100 mg  100 mg Oral DAILY    multivitamin, tx-iron-ca-min (THERA-M w/ IRON) tablet 1 Tablet  1 Tablet Oral DAILY    dexmedeTOMidine in 0.9 % NaCl (PRECEDEX) 400 mcg/100 mL (4 mcg/mL) infusion soln  0.1-1.5 mcg/kg/hr IntraVENous TITRATE    sodium chloride (NS) flush 5-40 mL  5-40 mL IntraVENous Q8H    sodium chloride (NS) flush 5-40 mL  5-40 mL IntraVENous PRN    ondansetron (ZOFRAN ODT) tablet 4 mg  4 mg Oral Q6H PRN    Or    ondansetron (ZOFRAN) injection 4 mg  4 mg IntraVENous Q6H PRN     Objective:     Patient Vitals for the past 8 hrs:   BP Temp Pulse Resp SpO2   04/03/22 1100 109/71 97.9 °F (36.6 °C) 71 18 99 %   04/03/22 1000 (!) 85/52  78 21 99 %   04/03/22 0900 (!) 90/53  78 21 98 %   04/03/22 0800 101/70  84 19 98 %   04/03/22 0700 106/72 98 °F (36.7 °C) 68 18 99 %   04/03/22 0600 109/74   19 97 %     No intake/output data recorded. 04/01 1901 - 04/03 0700  In: 4056.1 [P.O.:948; I.V.:3108.1]  Out: 3750 [Urine:3750]    Lab Review   Recent Results (from the past 24 hour(s))   CBC WITH AUTOMATED DIFF    Collection Time: 04/03/22  3:34 AM   Result Value Ref Range    WBC 4.5 4.1 - 11.1 K/uL    RBC 2.34 (L) 4.10 - 5.70 M/uL    HGB 8.2 (L) 12.1 - 17.0 g/dL    HCT 24.3 (L) 36.6 - 50.3 %    .8 (H) 80.0 - 99.0 FL    MCH 35.0 (H) 26.0 - 34.0 PG    MCHC 33.7 30.0 - 36.5 g/dL    RDW 21.0 (H) 11.5 - 14.5 %    PLATELET 155 (L) 277 - 400 K/uL    MPV 11.0 8.9 - 12.9 FL    NRBC 23.2 (H) 0.0  WBC    ABSOLUTE NRBC 1.32 (H) 0.00 - 0.01 K/uL    NEUTROPHILS 59 32 - 75 %    LYMPHOCYTES 29 12 - 49 %    MONOCYTES 4 (L) 5 - 13 %    EOSINOPHILS 0 0 - 7 %    BASOPHILS 2 (H) 0 - 1 %    MYELOCYTES 6 (H) 0 %    NRBC 27.0  WBC    IMMATURE GRANULOCYTES 0 %    ABS. NEUTROPHILS 2.6 1.8 - 8.0 K/UL    ABS. LYMPHOCYTES 1.3 0.8 - 3.5 K/UL    ABS. MONOCYTES 0.2 0.0 - 1.0 K/UL    ABS. EOSINOPHILS 0.0 0.0 - 0.4 K/UL    ABS. BASOPHILS 0.1 0.0 - 0.1 K/UL    ABSOLUTE NRBC 1.21 K/uL    ABS. IMM.  GRANS. 0.0 K/UL    DF Manual      RBC COMMENTS Polychromasia  1+        RBC COMMENTS Stomatocytes  1+        RBC COMMENTS Hypochromia  1+       RENAL FUNCTION PANEL    Collection Time: 04/03/22  3:34 AM   Result Value Ref Range    Sodium 138 136 - 145 mmol/L    Potassium 2.7 (LL) 3.5 - 5.1 mmol/L    Chloride 106 97 - 108 mmol/L    CO2 22 21 - 32 mmol/L    Anion gap 10 5 - 15 mmol/L    Glucose 113 (H) 65 - 100 mg/dL    BUN 5 (L) 6 - 20 mg/dL    Creatinine 0.69 (L) 0.70 - 1.30 mg/dL    BUN/Creatinine ratio 7 (L) 12 - 20      GFR est AA >60 >60 ml/min/1.73m2    GFR est non-AA >60 >60 ml/min/1.73m2    Calcium 7.8 (L) 8.5 - 10.1 mg/dL    Phosphorus 1.9 (L) 2.6 - 4.7 mg/dL    Albumin 1.9 (L) 3.5 - 5.0 g/dL   HEPATIC FUNCTION PANEL    Collection Time: 04/03/22  3:34 AM   Result Value Ref Range    Protein, total 5.6 (L) 6.4 - 8.2 g/dL    Albumin 1.9 (L) 3.5 - 5.0 g/dL    Globulin 3.7 2.0 - 4.0 g/dL    A-G Ratio 0.5 (L) 1.1 - 2.2      Bilirubin, total 11.2 (H) 0.2 - 1.0 mg/dL    Bilirubin, direct 9.0 (H) 0.0 - 0.2 mg/dL    Alk. phosphatase 605 (H) 45 - 117 U/L    AST (SGOT) 164 (H) 15 - 37 U/L    ALT (SGPT) 56 12 - 78 U/L   PROTHROMBIN TIME + INR    Collection Time: 04/03/22  3:34 AM   Result Value Ref Range    Prothrombin time 13.1 11.9 - 14.6 sec    INR 1.0 0.9 - 1.1       Additional comments: None    NEUROLOGICAL EXAM:  Appearance: The patient is well developed, well nourished, and is in no acute distress. Mental Status: Oriented to time, place and person. Mood and affect appropriate. Cranial Nerves:   Intact visual fields. TORRI, EOM's full, no nystagmus, no ptosis. Facial movement is symmetric. Hearing is normal bilaterally. Motor:  5/5 strength in upper and lower proximal and distal muscles. Normal bulk and tone. Reflexes:   Deep tendon reflexes 1/4 and symmetrical.   Sensory:   Responds to pain. Gait:  Deffered. Tremor:   No tremor noted. Cerebellar:  Not checked. Neurovascular:  Normal heart sounds and regular rhythm. Assessment:   1. Syncopal episode followed by seizure activity: On the basis of the history seems like patient had a syncopal episode preceded by lightheadedness excessive sweating.   His abnormal liver functions and alcohol dependence could have contributed to the episode. I doubt any primary etiology for the seizure and will not recommend any neurological work-up at this point. I also agree with not starting any antiepileptic medicines. 2.  Abnormal liver function with very high bilirubin 18: Patient's CT scan showed some fatty liver with swelling. In the light of abnormal enzymes, agree with the GI consult and their work-up. 3.  History of seizures 3 years ago but none since: Seems like related to his alcohol dependence. Plan:   1. Continue with ETOH withdrawal protocol. I will sign off the case for now, however, please do not hesitate to call if needed again.     Thank you,    Signed:  Héctor Guerrero MD  4/3/2022  2:52 PM

## 2022-04-03 NOTE — PROGRESS NOTES
Hospitalist Progress Note               Daily Progress Note: 4/3/2022      Subjective:   Per admitting physician    42 y.o. male with remote history of seizures 3 years ago one time presents to the referring facility emergency room after he developed a seizure-like activity with fall and altered mental status weakness by the family. He denies any fever, chills. Denies any nausea or vomiting. Little more than 2 weeks ago on March 11 presented to the emergency room complaining of feeling dizzy and lightheaded when he was trying to move boxes. At that time on laboratory data he has mild metabolic acidosis and bilirubin was elevated at 8. Thought that he was may be drinking alcohol. Today on the laboratory data he has significantly elevated bilirubin, and due to seizure-like activity  and needing a neurological evaluation transferred here for further management.     Patient states that he works 5 days a week, on 2 days that he is off he drink alcohol. He drinks 40 ounce beer 2 cans daily only those 2 days. However, his wife stated that patient has been drinking every day, all day recently. He denies any drug abuse.      -----      Patient seen and examined at bedside, one-to-one sitter is present, and no over night events per nursing staff. Patient is currently on Precedex 0.4 mcg/kg/hr, following CIWA protocol due to alcohol withdrawal and altered mental status. As I entered the room patient was sleeping and resting comfortably, however he was able to wake up and have a conversation. He was acting appropriately and was cooperative during my interview and physical exam. At this time he has no new complaints and denies any subjective symptoms. Potassium improved yesterday after repletion therapy to 3.0 however, it has decreased back to 2.7. ALT stable, AST has improved and is trending to a normal range, in addition ALK PHOS is also improving, yesterday it was 706 today it is 605. Problem List:  Problem List as of 4/3/2022 Date Reviewed: 3/31/2022          Codes Class Noted - Resolved    Hyperbilirubinemia ICD-10-CM: E80.6  ICD-9-CM: 782.4  3/31/2022 - Present        Seizure ([address] ICD-10-CM: R56.9  ICD-9-CM: 780.39  3/31/2022 - Present        Hepatitis, acute ICD-10-CM: B17.9  ICD-9-CM: 570  3/31/2022 - Present        Hypoxia ICD-10-CM: R09.02  ICD-9-CM: 799.02  10/7/2021 - Present        Positive D dimer ICD-10-CM: R79.89  ICD-9-CM: 790.92  10/7/2021 - Present        Pneumonia due to COVID-19 virus ICD-10-CM: U07.1, J12.82  ICD-9-CM: 480.8, 079.89  10/7/2021 - Present              Medications reviewed  Current Facility-Administered Medications   Medication Dose Route Frequency    potassium chloride SR (KLOR-CON 10) tablet 40 mEq  40 mEq Oral Q4H    hydrALAZINE (APRESOLINE) 20 mg/mL injection 10 mg  10 mg IntraVENous Q4H PRN    LORazepam (ATIVAN) injection 2 mg  2 mg IntraVENous Q1H PRN    LORazepam (ATIVAN) injection 4 mg  4 mg IntraVENous Q1H PRN    dextrose 5% and 0.9% NaCl infusion  75 mL/hr IntraVENous CONTINUOUS    folic acid (FOLVITE) tablet 1 mg  1 mg Oral DAILY    thiamine mononitrate (B-1) tablet 100 mg  100 mg Oral DAILY    multivitamin, tx-iron-ca-min (THERA-M w/ IRON) tablet 1 Tablet  1 Tablet Oral DAILY    dexmedeTOMidine in 0.9 % NaCl (PRECEDEX) 400 mcg/100 mL (4 mcg/mL) infusion soln  0.1-1.5 mcg/kg/hr IntraVENous TITRATE    sodium chloride (NS) flush 5-40 mL  5-40 mL IntraVENous Q8H    sodium chloride (NS) flush 5-40 mL  5-40 mL IntraVENous PRN    ondansetron (ZOFRAN ODT) tablet 4 mg  4 mg Oral Q6H PRN    Or    ondansetron (ZOFRAN) injection 4 mg  4 mg IntraVENous Q6H PRN       Review of Systems:   Not required    Objective:   Physical Exam:     Visit Vitals  /74   Pulse 66   Temp 98.1 °F (36.7 °C)   Resp 19   SpO2 97%      O2 Device: None (Room air)    Temp (24hrs), Av.4 °F (36.9 °C), Min:97.3 °F (36.3 °C), Max:99.9 °F (37.7 °C)    No intake/output data recorded. 04/01 1901 - 04/03 0700  In: 4056.1 [P.O.:948; I.V.:3108.1]  Out: 3750 [Urine:3750]      Physical Exam  Vitals and nursing note reviewed. Constitutional:       General: He is sleeping. Appearance: Normal appearance. He is normal weight. Interventions: He is sedated and restrained. Eyes:      General: Scleral icterus present. Comments: Scleral icterus has improved since initial examination of patient. Cardiovascular:      Rate and Rhythm: Normal rate and regular rhythm. Pulses: Normal pulses. Heart sounds: Normal heart sounds. Pulmonary:      Effort: Pulmonary effort is normal. No tachypnea. Breath sounds: Normal breath sounds. Abdominal:      General: Abdomen is flat. Bowel sounds are normal. There is no distension. Palpations: Abdomen is soft. Tenderness: There is no abdominal tenderness. There is no guarding or rebound. Skin:     General: Skin is warm and dry. Data Review:       Recent Days:  Recent Labs     04/03/22  0334 04/01/22  0621 03/31/22  1357   WBC 5.7 5.2 6.6   HGB 8.2* 8.4* 10.4*   HCT 24.3* 24.5* 31.2*   * 101* 125*     Recent Labs     04/03/22  0334 04/02/22  0350 04/01/22  1101 03/31/22  1357 03/31/22  1357    141 133*   < > 131*   K 2.7* 3.0* 2.7*   < > 4.6    109* 98   < > 92*   CO2 22 25 24   < > 21   * 171* 185*   < > 147*   BUN 5* 3* 3*   < > 8   CREA 0.69* 0.70 1.08   < > 1.15   CA 7.8* 7.9* 7.7*   < > 7.9*   MG  --   --   --   --  1.4*   PHOS 1.9*  --   --   --   --    ALB 1.9*  1.9* 2.2* 2.2*   < > 2.2*   TBILI 11.2* 14.9* 17.7*   < > 18.0*   ALT 56 62 62   < > 75   INR 1.0 1.1  --   --  1.0    < > = values in this interval not displayed. No results for input(s): PH, PCO2, PO2, HCO3, FIO2 in the last 72 hours.     24 Hour Results:  Recent Results (from the past 24 hour(s))   CBC WITH AUTOMATED DIFF    Collection Time: 04/03/22  3:34 AM   Result Value Ref Range    WBC 5.7 4.1 - 11.1 K/uL    RBC 2.34 (L) 4.10 - 5.70 M/uL    HGB 8.2 (L) 12.1 - 17.0 g/dL    HCT 24.3 (L) 36.6 - 50.3 %    .8 (H) 80.0 - 99.0 FL    MCH 35.0 (H) 26.0 - 34.0 PG    MCHC 33.7 30.0 - 36.5 g/dL    RDW 21.0 (H) 11.5 - 14.5 %    PLATELET 260 (L) 883 - 400 K/uL    MPV 11.0 8.9 - 12.9 FL    NRBC 23.2 (H) 0.0  WBC    ABSOLUTE NRBC 1.32 (H) 0.00 - 0.01 K/uL    NEUTROPHILS PENDING %    LYMPHOCYTES PENDING %    MONOCYTES PENDING %    EOSINOPHILS PENDING %    BASOPHILS PENDING %    IMMATURE GRANULOCYTES PENDING %    ABS. NEUTROPHILS PENDING K/UL    ABS. LYMPHOCYTES PENDING K/UL    ABS. MONOCYTES PENDING K/UL    ABS. EOSINOPHILS PENDING K/UL    ABS. BASOPHILS PENDING K/UL    ABS. IMM. GRANS. PENDING K/UL    DF PENDING    RENAL FUNCTION PANEL    Collection Time: 04/03/22  3:34 AM   Result Value Ref Range    Sodium 138 136 - 145 mmol/L    Potassium 2.7 (LL) 3.5 - 5.1 mmol/L    Chloride 106 97 - 108 mmol/L    CO2 22 21 - 32 mmol/L    Anion gap 10 5 - 15 mmol/L    Glucose 113 (H) 65 - 100 mg/dL    BUN 5 (L) 6 - 20 mg/dL    Creatinine 0.69 (L) 0.70 - 1.30 mg/dL    BUN/Creatinine ratio 7 (L) 12 - 20      GFR est AA >60 >60 ml/min/1.73m2    GFR est non-AA >60 >60 ml/min/1.73m2    Calcium 7.8 (L) 8.5 - 10.1 mg/dL    Phosphorus 1.9 (L) 2.6 - 4.7 mg/dL    Albumin 1.9 (L) 3.5 - 5.0 g/dL   HEPATIC FUNCTION PANEL    Collection Time: 04/03/22  3:34 AM   Result Value Ref Range    Protein, total 5.6 (L) 6.4 - 8.2 g/dL    Albumin 1.9 (L) 3.5 - 5.0 g/dL    Globulin 3.7 2.0 - 4.0 g/dL    A-G Ratio 0.5 (L) 1.1 - 2.2      Bilirubin, total 11.2 (H) 0.2 - 1.0 mg/dL    Bilirubin, direct 9.0 (H) 0.0 - 0.2 mg/dL    Alk.  phosphatase 605 (H) 45 - 117 U/L    AST (SGOT) 164 (H) 15 - 37 U/L    ALT (SGPT) 56 12 - 78 U/L   PROTHROMBIN TIME + INR    Collection Time: 04/03/22  3:34 AM   Result Value Ref Range    Prothrombin time 13.1 11.9 - 14.6 sec    INR 1.0 0.9 - 1.1         XR CHEST PORT   Final Result      US ABD LTD   Final Result      Nonspecific gallbladder wall thickening, differential diagnosis including   hepatic disease, cholecystitis. No gallstones seen. If there is clinical concern   for cholecystitis consider HIDA scan. Fatty liver. Assessment:    Cholestatic Jaundice likely due to advanced alcoholic liver disease. However, somewhat difficult to explain the elevated alkaline phosphatase. Lack of prothrombin time elevation would also argue against hepatic failure. Still concerned about intrahepatic obstructive process. Patient needs MRI    Alcohol withdrawal syndrome    Seizure/History of Alcohol Use Disorder: likely a combination that resulted in the seizure from possible withdrawal.     Vitamin D deficiency    Macrocytic Anemia, folate deficiency. folate is 3.7    Thrombocytopenia    Hyponatremia-resolved    Hypokalemia    Hypertension        Plan:  -Awaiting WILMA  results  -Replete potassium  -Replete folate  -Measure ins and outs  -Continue IV fluids, repeat labs AM  -Continue CIWA protocol including Precedex  -Continue Ativan as needed for agitation  -Hydralazine as needed for a systolic blood pressure greater than 618 or diastolic blood pressure greater than 100.  -Continue current medications  - MRCP/Abdominal MRI likely tomorrow, if he continues to progress       Care Plan discussed with: Patient/Family    Disposition: Continue current ICU treatment. Total time spent with patient: 30 minutes.     Shellie Jauregui MD

## 2022-04-03 NOTE — PROGRESS NOTES
Progress Note    Patient: Kathya Cadet. MRN: [id number]  SSN: [id number]    YOB: 1980  Age: 42 y.o. Sex: male      Admit Date: 3/31/2022    LOS: 2 days     Subjective:    one-to-one sitter is present. \ Patient is sedated with  CIWA protocol due to alcohol withdrawal and altered mental status    No documented nausea vomiting GI bleeding  Past Medical History:   Diagnosis Date    Seizure ([facility] [address]         Current Facility-Administered Medications:     hydrALAZINE (APRESOLINE) 20 mg/mL injection 10 mg, 10 mg, IntraVENous, Q4H PRN, Melanie De La Garza MD, 10 mg at 04/02/22 1817    LORazepam (ATIVAN) injection 2 mg, 2 mg, IntraVENous, Q1H PRN, Luis Angel Johnson MD, 2 mg at 04/02/22 4618    LORazepam (ATIVAN) injection 4 mg, 4 mg, IntraVENous, Q1H PRN, Luis Angel Johnson MD    dextrose 5% and 0.9% NaCl infusion, 75 mL/hr, IntraVENous, CONTINUOUS, Zachery Chu MD, Last Rate: 75 mL/hr at 04/02/22 0436, 75 mL/hr at 26/66/05 5584    folic acid (FOLVITE) tablet 1 mg, 1 mg, Oral, DAILY, Zachery Chu MD, 1 mg at 04/02/22 1531    thiamine mononitrate (B-1) tablet 100 mg, 100 mg, Oral, DAILY, Zachery Chu MD, 100 mg at 04/02/22 1531    multivitamin, tx-iron-ca-min (THERA-M w/ IRON) tablet 1 Tablet, 1 Tablet, Oral, DAILY, Zachery Chu MD, 1 Tablet at 04/02/22 1531    dexmedeTOMidine in 0.9 % NaCl (PRECEDEX) 400 mcg/100 mL (4 mcg/mL) infusion soln, 0.1-1.5 mcg/kg/hr, IntraVENous, TITRATE, Zachery Moran MD, Last Rate: 8.2 mL/hr at 04/02/22 2136, 0.5 mcg/kg/hr at 04/02/22 2136    sodium chloride (NS) flush 5-40 mL, 5-40 mL, IntraVENous, Q8H, Letty Ellis MD, 10 mL at 04/02/22 1529    sodium chloride (NS) flush 5-40 mL, 5-40 mL, IntraVENous, PRN, Letty Ellis MD    ondansetron (ZOFRAN ODT) tablet 4 mg, 4 mg, Oral, Q6H PRN **OR** ondansetron (ZOFRAN) injection 4 mg, 4 mg, IntraVENous, Q6H PRN, Letty Ellis MD    Objective:     Vitals: 04/02/22 1700 04/02/22 1800 04/02/22 1817 04/02/22 1901   BP: (!) 149/103 (!) 151/108 (!) 151/108    Pulse: 66 63 66    Resp: 20 19     Temp:    97.3 °F (36.3 °C)   SpO2: 98% 96%          Intake and Output:  Current Shift: No intake/output data recorded. Last three shifts: 04/01 0701 - 04/02 1900  In: 2396.7 [I.V.:2396.7]  Out: 3550 [Urine:3550]    Physical Exam:   Physical Exam  Constitutional:       Appearance: He is ill-appearing. HENT:      Mouth/Throat:      Mouth: Mucous membranes are dry. Eyes:      General: Scleral icterus present. Cardiovascular:      Rate and Rhythm: Normal rate. Pulses: Normal pulses. Heart sounds: Normal heart sounds. Pulmonary:      Effort: Pulmonary effort is normal.      Breath sounds: Normal breath sounds. Abdominal:      General: Abdomen is flat. Bowel sounds are normal.      Palpations: Abdomen is soft. Tenderness: There is no rebound. Musculoskeletal:      Cervical back: Normal range of motion and neck supple. Skin:     General: Skin is warm. Psychiatric:         Mood and Affect: Mood normal.          Lab/Data Review:  Recent Results (from the past 24 hour(s))   METABOLIC PANEL, COMPREHENSIVE    Collection Time: 04/02/22  3:50 AM   Result Value Ref Range    Sodium 141 136 - 145 mmol/L    Potassium 3.0 (L) 3.5 - 5.1 mmol/L    Chloride 109 (H) 97 - 108 mmol/L    CO2 25 21 - 32 mmol/L    Anion gap 7 5 - 15 mmol/L    Glucose 171 (H) 65 - 100 mg/dL    BUN 3 (L) 6 - 20 mg/dL    Creatinine 0.70 0.70 - 1.30 mg/dL    BUN/Creatinine ratio 4 (L) 12 - 20      GFR est AA >60 >60 ml/min/1.73m2    GFR est non-AA >60 >60 ml/min/1.73m2    Calcium 7.9 (L) 8.5 - 10.1 mg/dL    Bilirubin, total 14.9 (H) 0.2 - 1.0 mg/dL    AST (SGOT) 192 (H) 15 - 37 U/L    ALT (SGPT) 62 12 - 78 U/L    Alk.  phosphatase 706 (H) 45 - 117 U/L    Protein, total 6.0 (L) 6.4 - 8.2 g/dL    Albumin 2.2 (L) 3.5 - 5.0 g/dL    Globulin 3.8 2.0 - 4.0 g/dL    A-G Ratio 0.6 (L) 1.1 - 2.2 PROTHROMBIN TIME + INR    Collection Time: 04/02/22  3:50 AM   Result Value Ref Range    Prothrombin time 14.0 11.9 - 14.6 sec    INR 1.1 0.9 - 1.1          XR CHEST PORT   Final Result      US ABD LTD   Final Result      Nonspecific gallbladder wall thickening, differential diagnosis including   hepatic disease, cholecystitis. No gallstones seen. If there is clinical concern   for cholecystitis consider HIDA scan. Fatty liver. Assessment:     Active Problems:    Hyperbilirubinemia (3/31/2022)      Seizure ([address] (3/31/2022)      Hepatitis, acute (3/31/2022)    Severe jaundice likely due to cholestasis  on alcoholic liver disease. Alcoholic fatty liver disease   had a seizure activity, seizure medicine side effect,   CT suggest possible cholecystitis although patient has no symptoms or abdominal tenderness.   CBD appears normal  No mass or other Occult malignancy noted on CT in the liver, pancreas, gallbladder,     Alcohol related seizure,  Increase of edema on the mucosa and colon, however secondary to the inflammatory process  Hepatitis -  INR normal , but there is some evidence of  liver failure , MELD score 19  Maddrey's discrimanit score 24.5 , no indication for steroid use  Plan:   Monitoring patient PT/INR renal function and bilirubin closely,   patient has liver failure, but not a candidate for liver transplant due to the acute alcohol abuse  -Continue IV fluids,   -Continue CIWA protocol including Precedex    Signed By: Trudy Ferrari MD     April 2, 2022        Thank you for allowing me to participate in this patients care  Cc Referring Physician   None

## 2022-04-03 NOTE — PROGRESS NOTES
Hospitalist Progress Note               Daily Progress Note: 4/3/2022      Subjective:   Per admitting physician    42 y.o. male with remote history of seizures 3 years ago one time presents to the referring facility emergency room after he developed a seizure-like activity with fall and altered mental status weakness by the family. He denies any fever, chills. Denies any nausea or vomiting. Little more than 2 weeks ago on March 11 presented to the emergency room complaining of feeling dizzy and lightheaded when he was trying to move boxes. At that time on laboratory data he has mild metabolic acidosis and bilirubin was elevated at 8. Thought that he was may be drinking alcohol. Today on the laboratory data he has significantly elevated bilirubin, and due to seizure-like activity  and needing a neurological evaluation transferred here for further management.     Patient states that he works 5 days a week, on 2 days that he is off he drink alcohol. He drinks 40 ounce beer 2 cans daily only those 2 days. Denies any drinking heavy alcohol intake recently. He denies any drug abuse.     October 2021 he had COVID-19 infection happened. Currently he is not on any medications.    -----      Patient seen and examined at bedside, one-to-one sitter is present, and no over night events per nursing staff. Patient is currently on Precedex 0.4 mcg/kg/hr, following CIWA protocol due to alcohol withdrawal and altered mental status. As I entered the room patient was sleeping and resting comfortably, however he was able to wake up and have a conversation. He was acting appropriately and was cooperative during my interview and physical exam. At this time he has no new complaints and denies any subjective symptoms. Potassium improved yesterday after repletion therapy to 3.0 however, it has decreased back to 2.7.    ALT stable, AST has improved and is trending to a normal range, in addition ALK PHOS is also improving, yesterday it was 706 today it is 605.            Problem List:  Problem List as of 4/3/2022 Date Reviewed: 3/31/2022          Codes Class Noted - Resolved    Hyperbilirubinemia ICD-10-CM: E80.6  ICD-9-CM: 782.4  3/31/2022 - Present        Seizure ([address] ICD-10-CM: R56.9  ICD-9-CM: 780.39  3/31/2022 - Present        Hepatitis, acute ICD-10-CM: B17.9  ICD-9-CM: 570  3/31/2022 - Present        Hypoxia ICD-10-CM: R09.02  ICD-9-CM: 799.02  10/7/2021 - Present        Positive D dimer ICD-10-CM: R79.89  ICD-9-CM: 790.92  10/7/2021 - Present        Pneumonia due to COVID-19 virus ICD-10-CM: U07.1, J12.82  ICD-9-CM: 480.8, 079.89  10/7/2021 - Present              Medications reviewed  Current Facility-Administered Medications   Medication Dose Route Frequency    potassium chloride SR (KLOR-CON 10) tablet 40 mEq  40 mEq Oral Q4H    hydrALAZINE (APRESOLINE) 20 mg/mL injection 10 mg  10 mg IntraVENous Q4H PRN    LORazepam (ATIVAN) injection 2 mg  2 mg IntraVENous Q1H PRN    LORazepam (ATIVAN) injection 4 mg  4 mg IntraVENous Q1H PRN    dextrose 5% and 0.9% NaCl infusion  75 mL/hr IntraVENous CONTINUOUS    folic acid (FOLVITE) tablet 1 mg  1 mg Oral DAILY    thiamine mononitrate (B-1) tablet 100 mg  100 mg Oral DAILY    multivitamin, tx-iron-ca-min (THERA-M w/ IRON) tablet 1 Tablet  1 Tablet Oral DAILY    dexmedeTOMidine in 0.9 % NaCl (PRECEDEX) 400 mcg/100 mL (4 mcg/mL) infusion soln  0.1-1.5 mcg/kg/hr IntraVENous TITRATE    sodium chloride (NS) flush 5-40 mL  5-40 mL IntraVENous Q8H    sodium chloride (NS) flush 5-40 mL  5-40 mL IntraVENous PRN    ondansetron (ZOFRAN ODT) tablet 4 mg  4 mg Oral Q6H PRN    Or    ondansetron (ZOFRAN) injection 4 mg  4 mg IntraVENous Q6H PRN       Review of Systems:   Not required    Objective:   Physical Exam:     Visit Vitals  /74   Pulse 66   Temp 98.1 °F (36.7 °C)   Resp 19   SpO2 97%      O2 Device: None (Room air)    Temp (24hrs), Av.4 °F (36.9 °C), Min:97.3 °F (36.3 °C), Max:99.9 °F (37.7 °C)    No intake/output data recorded. 04/01 1901 - 04/03 0700  In: 4056.1 [P.O.:948; I.V.:3108.1]  Out: 3750 [Urine:3750]      Physical Exam  Vitals and nursing note reviewed. Constitutional:       General: He is sleeping. Appearance: Normal appearance. He is normal weight. Interventions: He is sedated and restrained. Eyes:      General: Scleral icterus present. Comments: Scleral icterus has improved since initial examination of patient. Cardiovascular:      Rate and Rhythm: Normal rate and regular rhythm. Pulses: Normal pulses. Heart sounds: Normal heart sounds. Pulmonary:      Effort: Pulmonary effort is normal. No tachypnea. Breath sounds: Normal breath sounds. Abdominal:      General: Abdomen is flat. Bowel sounds are normal. There is no distension. Palpations: Abdomen is soft. Tenderness: There is no abdominal tenderness. There is no guarding or rebound. Skin:     General: Skin is warm and dry. Data Review:       Recent Days:  Recent Labs     04/03/22  0334 04/01/22  0621 03/31/22  1357   WBC 5.7 5.2 6.6   HGB 8.2* 8.4* 10.4*   HCT 24.3* 24.5* 31.2*   * 101* 125*     Recent Labs     04/03/22  0334 04/02/22  0350 04/01/22  1101 03/31/22  1357 03/31/22  1357    141 133*   < > 131*   K 2.7* 3.0* 2.7*   < > 4.6    109* 98   < > 92*   CO2 22 25 24   < > 21   * 171* 185*   < > 147*   BUN 5* 3* 3*   < > 8   CREA 0.69* 0.70 1.08   < > 1.15   CA 7.8* 7.9* 7.7*   < > 7.9*   MG  --   --   --   --  1.4*   PHOS 1.9*  --   --   --   --    ALB 1.9*  1.9* 2.2* 2.2*   < > 2.2*   TBILI 11.2* 14.9* 17.7*   < > 18.0*   ALT 56 62 62   < > 75   INR 1.0 1.1  --   --  1.0    < > = values in this interval not displayed. No results for input(s): PH, PCO2, PO2, HCO3, FIO2 in the last 72 hours.     24 Hour Results:  Recent Results (from the past 24 hour(s))   CBC WITH AUTOMATED DIFF    Collection Time: 04/03/22  3:34 AM   Result Value Ref Range    WBC 5.7 4.1 - 11.1 K/uL    RBC 2.34 (L) 4.10 - 5.70 M/uL    HGB 8.2 (L) 12.1 - 17.0 g/dL    HCT 24.3 (L) 36.6 - 50.3 %    .8 (H) 80.0 - 99.0 FL    MCH 35.0 (H) 26.0 - 34.0 PG    MCHC 33.7 30.0 - 36.5 g/dL    RDW 21.0 (H) 11.5 - 14.5 %    PLATELET 952 (L) 694 - 400 K/uL    MPV 11.0 8.9 - 12.9 FL    NRBC 23.2 (H) 0.0  WBC    ABSOLUTE NRBC 1.32 (H) 0.00 - 0.01 K/uL    NEUTROPHILS PENDING %    LYMPHOCYTES PENDING %    MONOCYTES PENDING %    EOSINOPHILS PENDING %    BASOPHILS PENDING %    IMMATURE GRANULOCYTES PENDING %    ABS. NEUTROPHILS PENDING K/UL    ABS. LYMPHOCYTES PENDING K/UL    ABS. MONOCYTES PENDING K/UL    ABS. EOSINOPHILS PENDING K/UL    ABS. BASOPHILS PENDING K/UL    ABS. IMM. GRANS. PENDING K/UL    DF PENDING    RENAL FUNCTION PANEL    Collection Time: 04/03/22  3:34 AM   Result Value Ref Range    Sodium 138 136 - 145 mmol/L    Potassium 2.7 (LL) 3.5 - 5.1 mmol/L    Chloride 106 97 - 108 mmol/L    CO2 22 21 - 32 mmol/L    Anion gap 10 5 - 15 mmol/L    Glucose 113 (H) 65 - 100 mg/dL    BUN 5 (L) 6 - 20 mg/dL    Creatinine 0.69 (L) 0.70 - 1.30 mg/dL    BUN/Creatinine ratio 7 (L) 12 - 20      GFR est AA >60 >60 ml/min/1.73m2    GFR est non-AA >60 >60 ml/min/1.73m2    Calcium 7.8 (L) 8.5 - 10.1 mg/dL    Phosphorus 1.9 (L) 2.6 - 4.7 mg/dL    Albumin 1.9 (L) 3.5 - 5.0 g/dL   HEPATIC FUNCTION PANEL    Collection Time: 04/03/22  3:34 AM   Result Value Ref Range    Protein, total 5.6 (L) 6.4 - 8.2 g/dL    Albumin 1.9 (L) 3.5 - 5.0 g/dL    Globulin 3.7 2.0 - 4.0 g/dL    A-G Ratio 0.5 (L) 1.1 - 2.2      Bilirubin, total 11.2 (H) 0.2 - 1.0 mg/dL    Bilirubin, direct 9.0 (H) 0.0 - 0.2 mg/dL    Alk.  phosphatase 605 (H) 45 - 117 U/L    AST (SGOT) 164 (H) 15 - 37 U/L    ALT (SGPT) 56 12 - 78 U/L   PROTHROMBIN TIME + INR    Collection Time: 04/03/22  3:34 AM   Result Value Ref Range    Prothrombin time 13.1 11.9 - 14.6 sec    INR 1.0 0.9 - 1.1         XR CHEST PORT   Final Result      US ABD LTD   Final Result      Nonspecific gallbladder wall thickening, differential diagnosis including   hepatic disease, cholecystitis. No gallstones seen. If there is clinical concern   for cholecystitis consider HIDA scan. Fatty liver. Assessment:    Cholestatic Jaundice likely due to advanced alcoholic liver disease. However, somewhat difficult to explain the elevated alkaline phosphatase. Lack of prothrombin time elevation would also argue against hepatic failure    Seizure/History of Alcohol Use Disorder: likely a combination that resulted in the seizure from possible withdrawal.     History of COVID in October 2021, lungs are unremarkable patient is stable from this perspective. Macrocytic Anemia, folate deficiency. folate is 3.7    Thrombocytopenia    Hyponatremia-resolved    Hypokalemia    Hypertension        Plan:  -Hepatitis panel negative  -AFP tumor marker negative  -Awaiting WILMA, HIV results  -Replete potassium  -Replete folate  -Measure ins and outs  -Continue IV fluids, repeat labs AM  -Continue CIWA protocol including Precedex  -Continue Ativan as needed for agitation  -Hydralazine as needed for a systolic blood pressure greater than 102 or diastolic blood pressure greater than 100.  -Continue current medications  - MRCP/Abdominal MRI likely tomorrow, if he continues to progress       Care Plan discussed with: Patient/Family    Disposition: Continue current ICU treatment. Total time spent with patient: 30 minutes.     Beny Ramirez

## 2022-04-03 NOTE — PROGRESS NOTES
Progress Note    Patient: Jimmy Howard MRN: [id number]  SSN: [id number]    YOB: 1980  Age: 42 y.o.   Sex: male      Admit Date: 3/31/2022    LOS: 3 days     Subjective:   Patient more weak than before,  Has tremendous jaundice,     On protocol for  alcohol withdrawal   No documented nausea vomiting GI bleeding  Past Medical History:   Diagnosis Date    Seizure ([facility], [place]         Current Facility-Administered Medications:     nicotine (NICODERM CQ) 21 mg/24 hr patch 1 Patch, 1 Patch, TransDERmal, DAILY, Nanette Delatorre MD, 1 Patch at 04/03/22 1252    cholecalciferol (VITAMIN D3) (1000 Units /25 mcg) tablet 2,000 Units, 2,000 Units, Oral, DAILY, Laura Paz MD, 2,000 Units at 04/03/22 0914    hydrALAZINE (APRESOLINE) 20 mg/mL injection 10 mg, 10 mg, IntraVENous, Q4H PRN, Radha Arias MD, 10 mg at 04/02/22 1817    LORazepam (ATIVAN) injection 2 mg, 2 mg, IntraVENous, Q1H PRN, Laura Paz MD, 2 mg at 04/02/22 3087    LORazepam (ATIVAN) injection 4 mg, 4 mg, IntraVENous, Q1H PRN, Laura Paz MD    dextrose 5% and 0.9% NaCl infusion, 75 mL/hr, IntraVENous, CONTINUOUS, Rik Rocha, Yanci Jackson MD, Last Rate: 75 mL/hr at 04/03/22 1424, 75 mL/hr at 35/60/28 4894    folic acid (FOLVITE) tablet 1 mg, 1 mg, Oral, DAILY, Laura Paz MD, 1 mg at 04/03/22 0908    thiamine mononitrate (B-1) tablet 100 mg, 100 mg, Oral, DAILY, Laura Paz MD, 100 mg at 04/03/22 0908    multivitamin, tx-iron-ca-min (THERA-M w/ IRON) tablet 1 Tablet, 1 Tablet, Oral, DAILY, Yanci Chu MD, 1 Tablet at 04/03/22 0909    dexmedeTOMidine in 0.9 % NaCl (PRECEDEX) 400 mcg/100 mL (4 mcg/mL) infusion soln, 0.1-1.5 mcg/kg/hr, IntraVENous, TITRATE, Yanci Glover MD, Last Rate: 6.6 mL/hr at 04/03/22 1356, 0.4 mcg/kg/hr at 04/03/22 1356    sodium chloride (NS) flush 5-40 mL, 5-40 mL, IntraVENous, Q8H, Nanette Delatorre MD, 10 mL at 04/03/22 1356    sodium chloride (NS) flush 5-40 mL, 5-40 mL, IntraVENous, PRN, Dayanna Spears MD    ondansetron (ZOFRAN ODT) tablet 4 mg, 4 mg, Oral, Q6H PRN **OR** ondansetron (ZOFRAN) injection 4 mg, 4 mg, IntraVENous, Q6H PRN, Dayanna Spears MD    Objective:     Vitals:    04/03/22 1400 04/03/22 1500 04/03/22 1600 04/03/22 1700   BP: 129/89 121/78 122/80 115/74   Pulse: 82 76 78 83   Resp: 21 22 21 21   Temp:  99.3 °F (37.4 °C)     SpO2: 97% 99% 99% 99%        Intake and Output:  Current Shift: No intake/output data recorded. Last three shifts: 04/01 1901 - 04/03 0700  In: 4056.1 [P.O.:948; I.V.:3108.1]  Out: 3750 [Urine:3750]    Physical Exam:   Physical Exam  Constitutional:       Appearance: He is ill-appearing. HENT:      Mouth/Throat:      Mouth: Mucous membranes are dry. Eyes:      General: Scleral icterus present. Cardiovascular:      Rate and Rhythm: Normal rate. Pulses: Normal pulses. Heart sounds: Normal heart sounds. Pulmonary:      Effort: Pulmonary effort is normal.      Breath sounds: Normal breath sounds. Abdominal:      General: Abdomen is flat. Bowel sounds are normal.      Palpations: Abdomen is soft. Tenderness: There is no rebound. Musculoskeletal:      Cervical back: Normal range of motion and neck supple. Skin:     General: Skin is warm.    Psychiatric:         Mood and Affect: Mood normal.          Lab/Data Review:  Recent Results (from the past 24 hour(s))   CBC WITH AUTOMATED DIFF    Collection Time: 04/03/22  3:34 AM   Result Value Ref Range    WBC 4.5 4.1 - 11.1 K/uL    RBC 2.34 (L) 4.10 - 5.70 M/uL    HGB 8.2 (L) 12.1 - 17.0 g/dL    HCT 24.3 (L) 36.6 - 50.3 %    .8 (H) 80.0 - 99.0 FL    MCH 35.0 (H) 26.0 - 34.0 PG    MCHC 33.7 30.0 - 36.5 g/dL    RDW 21.0 (H) 11.5 - 14.5 %    PLATELET 635 (L) 171 - 400 K/uL    MPV 11.0 8.9 - 12.9 FL    NRBC 23.2 (H) 0.0  WBC    ABSOLUTE NRBC 1.32 (H) 0.00 - 0.01 K/uL    NEUTROPHILS 59 32 - 75 %    LYMPHOCYTES 29 12 - 49 % MONOCYTES 4 (L) 5 - 13 %    EOSINOPHILS 0 0 - 7 %    BASOPHILS 2 (H) 0 - 1 %    MYELOCYTES 6 (H) 0 %    NRBC 27.0  WBC    IMMATURE GRANULOCYTES 0 %    ABS. NEUTROPHILS 2.6 1.8 - 8.0 K/UL    ABS. LYMPHOCYTES 1.3 0.8 - 3.5 K/UL    ABS. MONOCYTES 0.2 0.0 - 1.0 K/UL    ABS. EOSINOPHILS 0.0 0.0 - 0.4 K/UL    ABS. BASOPHILS 0.1 0.0 - 0.1 K/UL    ABSOLUTE NRBC 1.21 K/uL    ABS. IMM. GRANS. 0.0 K/UL    DF Manual      RBC COMMENTS Polychromasia  1+        RBC COMMENTS Stomatocytes  1+        RBC COMMENTS Hypochromia  1+       RENAL FUNCTION PANEL    Collection Time: 04/03/22  3:34 AM   Result Value Ref Range    Sodium 138 136 - 145 mmol/L    Potassium 2.7 (LL) 3.5 - 5.1 mmol/L    Chloride 106 97 - 108 mmol/L    CO2 22 21 - 32 mmol/L    Anion gap 10 5 - 15 mmol/L    Glucose 113 (H) 65 - 100 mg/dL    BUN 5 (L) 6 - 20 mg/dL    Creatinine 0.69 (L) 0.70 - 1.30 mg/dL    BUN/Creatinine ratio 7 (L) 12 - 20      GFR est AA >60 >60 ml/min/1.73m2    GFR est non-AA >60 >60 ml/min/1.73m2    Calcium 7.8 (L) 8.5 - 10.1 mg/dL    Phosphorus 1.9 (L) 2.6 - 4.7 mg/dL    Albumin 1.9 (L) 3.5 - 5.0 g/dL   HEPATIC FUNCTION PANEL    Collection Time: 04/03/22  3:34 AM   Result Value Ref Range    Protein, total 5.6 (L) 6.4 - 8.2 g/dL    Albumin 1.9 (L) 3.5 - 5.0 g/dL    Globulin 3.7 2.0 - 4.0 g/dL    A-G Ratio 0.5 (L) 1.1 - 2.2      Bilirubin, total 11.2 (H) 0.2 - 1.0 mg/dL    Bilirubin, direct 9.0 (H) 0.0 - 0.2 mg/dL    Alk. phosphatase 605 (H) 45 - 117 U/L    AST (SGOT) 164 (H) 15 - 37 U/L    ALT (SGPT) 56 12 - 78 U/L   PROTHROMBIN TIME + INR    Collection Time: 04/03/22  3:34 AM   Result Value Ref Range    Prothrombin time 13.1 11.9 - 14.6 sec    INR 1.0 0.9 - 1.1          XR CHEST PORT   Final Result      US ABD LTD   Final Result      Nonspecific gallbladder wall thickening, differential diagnosis including   hepatic disease, cholecystitis. No gallstones seen. If there is clinical concern   for cholecystitis consider HIDA scan.    Fatty liver.      MRI ABD W MRCP W WO CONT    (Results Pending)        Assessment:     Active Problems:    Hyperbilirubinemia (3/31/2022)      Seizure ([address] (3/31/2022)      Hepatitis, acute (3/31/2022)    Severe jaundice likely due to cholestasis  on alcoholic liver disease. Alcoholic fatty liver disease, chronic, may have cirrhosis       CT suggest possible cholecystitis although patient has no symptoms or abdominal tenderness.   CBD appears normal  No mass or other Occult malignancy noted on CT in the liver, pancreas, gallbladder,     Alcohol related seizure,  Increase of edema on the mucosa and colon, however secondary to the inflammatory process  Hepatitis -  INR  Remained normal , but there is some evidence of  liver failure , MELD score 19  Maddrey's discrimanit score 24.5 , no indication for steroid use  Plan:   Monitoring patient PT/INR renal function and bilirubin closely,   patient has liver failure, but not a candidate for liver transplant due to the acute alcohol abuse  -Continue CIWA protocol including Precedex    No endoscopy or biliary intervention is needed at this point, will sign off  Signed By: Trudy Ferrari MD     April 3, 2022        Thank you for allowing me to participate in this patients care  Cc Referring Physician   None

## 2022-04-04 ENCOUNTER — APPOINTMENT (OUTPATIENT)
Dept: MRI IMAGING | Age: 42
End: 2022-04-04
Attending: INTERNAL MEDICINE
Payer: MEDICAID

## 2022-04-04 LAB
ALBUMIN SERPL-MCNC: 1.7 G/DL (ref 3.5–5)
ALBUMIN SERPL-MCNC: 1.8 G/DL (ref 3.5–5)
ALBUMIN/GLOB SERPL: 0.6 {RATIO} (ref 1.1–2.2)
ALP SERPL-CCNC: 590 U/L (ref 45–117)
ALT SERPL-CCNC: 58 U/L (ref 12–78)
ANA TITR SER IF: NEGATIVE {TITER}
ANION GAP SERPL CALC-SCNC: 8 MMOL/L (ref 5–15)
AST SERPL W P-5'-P-CCNC: 143 U/L (ref 15–37)
BASOPHILS # BLD: 0 K/UL (ref 0–0.1)
BASOPHILS NFR BLD: 0 % (ref 0–1)
BILIRUB DIRECT SERPL-MCNC: 6.5 MG/DL (ref 0–0.2)
BILIRUB SERPL-MCNC: 7.9 MG/DL (ref 0.2–1)
BUN SERPL-MCNC: 6 MG/DL (ref 6–20)
BUN/CREAT SERPL: 10 (ref 12–20)
CA-I BLD-MCNC: 7.4 MG/DL (ref 8.5–10.1)
CHLORIDE SERPL-SCNC: 110 MMOL/L (ref 97–108)
CO2 SERPL-SCNC: 19 MMOL/L (ref 21–32)
CREAT SERPL-MCNC: 0.62 MG/DL (ref 0.7–1.3)
DIFFERENTIAL METHOD BLD: ABNORMAL
EOSINOPHIL # BLD: 0 K/UL (ref 0–0.4)
EOSINOPHIL NFR BLD: 0 % (ref 0–7)
ERYTHROCYTE [DISTWIDTH] IN BLOOD BY AUTOMATED COUNT: 21.5 % (ref 11.5–14.5)
GLOBULIN SER CALC-MCNC: 3 G/DL (ref 2–4)
GLUCOSE SERPL-MCNC: 116 MG/DL (ref 65–100)
HCT VFR BLD AUTO: 23.1 % (ref 36.6–50.3)
HGB BLD-MCNC: 7.7 G/DL (ref 12.1–17)
IMM GRANULOCYTES # BLD AUTO: 0 K/UL
IMM GRANULOCYTES NFR BLD AUTO: 0 %
LYMPHOCYTES # BLD: 1.8 K/UL (ref 0.8–3.5)
LYMPHOCYTES NFR BLD: 30 % (ref 12–49)
MCH RBC QN AUTO: 35.3 PG (ref 26–34)
MCHC RBC AUTO-ENTMCNC: 33.3 G/DL (ref 30–36.5)
MCV RBC AUTO: 106 FL (ref 80–99)
MONOCYTES # BLD: 0.4 K/UL (ref 0–1)
MONOCYTES NFR BLD: 7 % (ref 5–13)
MYELOCYTES NFR BLD MANUAL: 3 %
NEUTS SEG # BLD: 3.5 K/UL (ref 1.8–8)
NEUTS SEG NFR BLD: 60 % (ref 32–75)
NRBC # BLD: 0.89 K/UL
NRBC # BLD: 1.03 K/UL (ref 0–0.01)
NRBC BLD MANUAL-RTO: 15 PER 100 WBC
NRBC BLD-RTO: 15.2 PER 100 WBC
PHOSPHATE SERPL-MCNC: 1.9 MG/DL (ref 2.6–4.7)
PLATELET # BLD AUTO: 135 K/UL (ref 150–400)
PLATELET COMMENTS,PCOM: ABNORMAL
PMV BLD AUTO: 10.5 FL (ref 8.9–12.9)
POTASSIUM SERPL-SCNC: 4 MMOL/L (ref 3.5–5.1)
PROT SERPL-MCNC: 4.8 G/DL (ref 6.4–8.2)
RBC # BLD AUTO: 2.18 M/UL (ref 4.1–5.7)
RBC MORPH BLD: ABNORMAL
SODIUM SERPL-SCNC: 137 MMOL/L (ref 136–145)
WBC # BLD AUTO: 5.9 K/UL (ref 4.1–11.1)

## 2022-04-04 PROCEDURE — A9577 INJ MULTIHANCE: HCPCS | Performed by: INTERNAL MEDICINE

## 2022-04-04 PROCEDURE — 74011000258 HC RX REV CODE- 258: Performed by: INTERNAL MEDICINE

## 2022-04-04 PROCEDURE — 74011250636 HC RX REV CODE- 250/636: Performed by: INTERNAL MEDICINE

## 2022-04-04 PROCEDURE — 74011250637 HC RX REV CODE- 250/637: Performed by: HOSPITALIST

## 2022-04-04 PROCEDURE — 74183 MRI ABD W/O CNTR FLWD CNTR: CPT

## 2022-04-04 PROCEDURE — 65270000029 HC RM PRIVATE

## 2022-04-04 PROCEDURE — 74011000250 HC RX REV CODE- 250: Performed by: INTERNAL MEDICINE

## 2022-04-04 PROCEDURE — 85025 COMPLETE CBC W/AUTO DIFF WBC: CPT

## 2022-04-04 PROCEDURE — 74011000250 HC RX REV CODE- 250: Performed by: HOSPITALIST

## 2022-04-04 PROCEDURE — 36415 COLL VENOUS BLD VENIPUNCTURE: CPT

## 2022-04-04 PROCEDURE — 74011250637 HC RX REV CODE- 250/637: Performed by: INTERNAL MEDICINE

## 2022-04-04 PROCEDURE — 80069 RENAL FUNCTION PANEL: CPT

## 2022-04-04 PROCEDURE — 80076 HEPATIC FUNCTION PANEL: CPT

## 2022-04-04 RX ADMIN — SODIUM CHLORIDE, PRESERVATIVE FREE 10 ML: 5 INJECTION INTRAVENOUS at 03:51

## 2022-04-04 RX ADMIN — SODIUM CHLORIDE, PRESERVATIVE FREE 10 ML: 5 INJECTION INTRAVENOUS at 14:17

## 2022-04-04 RX ADMIN — THIAMINE HCL TAB 100 MG 100 MG: 100 TAB at 09:35

## 2022-04-04 RX ADMIN — DEXTROSE AND SODIUM CHLORIDE 75 ML/HR: 5; 900 INJECTION, SOLUTION INTRAVENOUS at 03:51

## 2022-04-04 RX ADMIN — GADOBENATE DIMEGLUMINE 14 ML: 529 INJECTION, SOLUTION INTRAVENOUS at 15:00

## 2022-04-04 RX ADMIN — Medication 2000 UNITS: at 09:35

## 2022-04-04 RX ADMIN — MULTIPLE VITAMINS W/ MINERALS TAB 1 TABLET: TAB at 09:35

## 2022-04-04 RX ADMIN — DEXMEDETOMIDINE HYDROCHLORIDE 0.4 MCG/KG/HR: 4 INJECTION, SOLUTION INTRAVENOUS at 07:20

## 2022-04-04 RX ADMIN — FOLIC ACID 1 MG: 1 TABLET ORAL at 09:35

## 2022-04-04 NOTE — PROGRESS NOTES
Problem: Delirium Treatment  Goal: *Cognitive status will be restored to baseline  Outcome: Progressing Towards Goal     Problem: Delirium Treatment  Goal: *Absence of falls  Outcome: Progressing Towards Goal     Problem: Delirium Treatment  Goal: *Emotional stability restored to baseline  Outcome: Progressing Towards Goal     Problem: Delirium Treatment  Goal: *Sensory perception restored to baseline  Outcome: Progressing Towards Goal     Problem: Delirium Treatment  Goal: *Level of consciousness restored to baseline  Outcome: Progressing Towards Goal

## 2022-04-04 NOTE — PROGRESS NOTES
Hospitalist Progress Note               Daily Progress Note: 4/4/2022      Subjective:   Per admitting physician    42 y.o. male with remote history of seizures 3 years ago one time presents to the referring facility emergency room after he developed a seizure-like activity with fall and altered mental status weakness by the family. He denies any fever, chills. Denies any nausea or vomiting. Little more than 2 weeks ago on March 11 presented to the emergency room complaining of feeling dizzy and lightheaded when he was trying to move boxes. At that time on laboratory data he has mild metabolic acidosis and bilirubin was elevated at 8. Thought that he was may be drinking alcohol. Today on the laboratory data he has significantly elevated bilirubin, and due to seizure-like activity  and needing a neurological evaluation transferred here for further management.     Patient states that he works 5 days a week, on 2 days that he is off he drink alcohol. He drinks 40 ounce beer 2 cans daily only those 2 days. However, his wife stated that patient has been drinking every day, all day recently. He denies any drug abuse.      -----      Patient seen and examined at bedside, one-to-one sitter is present, and no over night events per nursing staff. Patient is currently on Precedex 0.4 mcg/kg/hr, following CIWA protocol due to alcohol withdrawal and altered mental status. As I entered the room patient was sleeping and resting comfortably, however he was able to wake up and have a conversation. He was acting appropriately and was cooperative during my interview and physical exam. At this time he has no new complaints and denies any subjective symptoms. Potassium improved yesterday after repletion therapy to 3.0 however, it has decreased back to 2.7. ALT stable, AST has improved and is trending to a normal range, in addition ALK PHOS is also improving, yesterday it was 706 today it is 605. Problem List:  Problem List as of 4/4/2022 Date Reviewed: 3/31/2022          Codes Class Noted - Resolved    Hyperbilirubinemia ICD-10-CM: E80.6  ICD-9-CM: 782.4  3/31/2022 - Present        Seizure ([address] ICD-10-CM: R56.9  ICD-9-CM: 780.39  3/31/2022 - Present        Hepatitis, acute ICD-10-CM: B17.9  ICD-9-CM: 570  3/31/2022 - Present        Hypoxia ICD-10-CM: R09.02  ICD-9-CM: 799.02  10/7/2021 - Present        Positive D dimer ICD-10-CM: R79.89  ICD-9-CM: 790.92  10/7/2021 - Present        Pneumonia due to COVID-19 virus ICD-10-CM: U07.1, J12.82  ICD-9-CM: 480.8, 079.89  10/7/2021 - Present              Medications reviewed  Current Facility-Administered Medications   Medication Dose Route Frequency    nicotine (NICODERM CQ) 21 mg/24 hr patch 1 Patch  1 Patch TransDERmal DAILY    cholecalciferol (VITAMIN D3) (1000 Units /25 mcg) tablet 2,000 Units  2,000 Units Oral DAILY    hydrALAZINE (APRESOLINE) 20 mg/mL injection 10 mg  10 mg IntraVENous Q4H PRN    LORazepam (ATIVAN) injection 2 mg  2 mg IntraVENous Q1H PRN    LORazepam (ATIVAN) injection 4 mg  4 mg IntraVENous Q1H PRN    dextrose 5% and 0.9% NaCl infusion  75 mL/hr IntraVENous CONTINUOUS    folic acid (FOLVITE) tablet 1 mg  1 mg Oral DAILY    thiamine mononitrate (B-1) tablet 100 mg  100 mg Oral DAILY    multivitamin, tx-iron-ca-min (THERA-M w/ IRON) tablet 1 Tablet  1 Tablet Oral DAILY    dexmedeTOMidine in 0.9 % NaCl (PRECEDEX) 400 mcg/100 mL (4 mcg/mL) infusion soln  0.1-1.5 mcg/kg/hr IntraVENous TITRATE    sodium chloride (NS) flush 5-40 mL  5-40 mL IntraVENous Q8H    sodium chloride (NS) flush 5-40 mL  5-40 mL IntraVENous PRN    ondansetron (ZOFRAN ODT) tablet 4 mg  4 mg Oral Q6H PRN    Or    ondansetron (ZOFRAN) injection 4 mg  4 mg IntraVENous Q6H PRN       Review of Systems:   Not required    Objective:   Physical Exam:     Visit Vitals  /82   Pulse 80   Temp 98.4 °F (36.9 °C)   Resp 20   SpO2 100%      O2 Device: None (Room air)    Temp (24hrs), Av.8 °F (37.1 °C), Min:97.9 °F (36.6 °C), Max:99.3 °F (37.4 °C)    No intake/output data recorded.  1901 -  0700  In: 4755.8 [P.O.:1948; I.V.:2807.8]  Out: 950 [Urine:950]      Physical Exam  Vitals and nursing note reviewed. Constitutional:       General: He is sleeping. Appearance: Normal appearance. He is normal weight. Interventions: He is sedated and restrained. Eyes:      General: Scleral icterus present. Comments: Scleral icterus has improved since initial examination of patient. Cardiovascular:      Rate and Rhythm: Normal rate and regular rhythm. Pulses: Normal pulses. Heart sounds: Normal heart sounds. Pulmonary:      Effort: Pulmonary effort is normal. No tachypnea. Breath sounds: Normal breath sounds. Abdominal:      General: Abdomen is flat. Bowel sounds are normal. There is no distension. Palpations: Abdomen is soft. Tenderness: There is no abdominal tenderness. There is no guarding or rebound. Skin:     General: Skin is warm and dry. Data Review:       Recent Days:  Recent Labs     22  0355 22  0334   WBC 6.8 4.5   HGB 7.7* 8.2*   HCT 23.1* 24.3*   * 138*     Recent Labs     22  0355 22  0334 22  0350    138 141   K 4.0 2.7* 3.0*   * 106 109*   CO2 19* 22 25   * 113* 171*   BUN 6 5* 3*   CREA 0.62* 0.69* 0.70   CA 7.4* 7.8* 7.9*   PHOS 1.9* 1.9*  --    ALB 1.8*  1.7* 1.9*  1.9* 2.2*   TBILI 7.9* 11.2* 14.9*   ALT 58 56 62   INR  --  1.0 1.1     No results for input(s): PH, PCO2, PO2, HCO3, FIO2 in the last 72 hours.     24 Hour Results:  Recent Results (from the past 24 hour(s))   CBC WITH AUTOMATED DIFF    Collection Time: 22  3:55 AM   Result Value Ref Range    WBC 6.8 4.1 - 11.1 K/uL    RBC 2.18 (L) 4.10 - 5.70 M/uL    HGB 7.7 (L) 12.1 - 17.0 g/dL    HCT 23.1 (L) 36.6 - 50.3 %    .0 (H) 80.0 - 99.0 FL    MCH 35.3 (H) 26.0 - 34.0 PG    MCHC 33.3 30.0 - 36.5 g/dL    RDW 21.5 (H) 11.5 - 14.5 %    PLATELET 105 (L) 659 - 400 K/uL    MPV 10.5 8.9 - 12.9 FL    NRBC 15.2 (H) 0.0  WBC    ABSOLUTE NRBC 1.03 (H) 0.00 - 0.01 K/uL    NEUTROPHILS PENDING %    LYMPHOCYTES PENDING %    MONOCYTES PENDING %    EOSINOPHILS PENDING %    BASOPHILS PENDING %    IMMATURE GRANULOCYTES PENDING %    ABS. NEUTROPHILS PENDING K/UL    ABS. LYMPHOCYTES PENDING K/UL    ABS. MONOCYTES PENDING K/UL    ABS. EOSINOPHILS PENDING K/UL    ABS. BASOPHILS PENDING K/UL    ABS. IMM. GRANS. PENDING K/UL    DF PENDING    RENAL FUNCTION PANEL    Collection Time: 04/04/22  3:55 AM   Result Value Ref Range    Sodium 137 136 - 145 mmol/L    Potassium 4.0 3.5 - 5.1 mmol/L    Chloride 110 (H) 97 - 108 mmol/L    CO2 19 (L) 21 - 32 mmol/L    Anion gap 8 5 - 15 mmol/L    Glucose 116 (H) 65 - 100 mg/dL    BUN 6 6 - 20 mg/dL    Creatinine 0.62 (L) 0.70 - 1.30 mg/dL    BUN/Creatinine ratio 10 (L) 12 - 20      GFR est AA >60 >60 ml/min/1.73m2    GFR est non-AA >60 >60 ml/min/1.73m2    Calcium 7.4 (L) 8.5 - 10.1 mg/dL    Phosphorus 1.9 (L) 2.6 - 4.7 mg/dL    Albumin 1.7 (L) 3.5 - 5.0 g/dL   HEPATIC FUNCTION PANEL    Collection Time: 04/04/22  3:55 AM   Result Value Ref Range    Protein, total 4.8 (L) 6.4 - 8.2 g/dL    Albumin 1.8 (L) 3.5 - 5.0 g/dL    Globulin 3.0 2.0 - 4.0 g/dL    A-G Ratio 0.6 (L) 1.1 - 2.2      Bilirubin, total 7.9 (H) 0.2 - 1.0 mg/dL    Bilirubin, direct 6.5 (H) 0.0 - 0.2 mg/dL    Alk. phosphatase 590 (H) 45 - 117 U/L    AST (SGOT) 143 (H) 15 - 37 U/L    ALT (SGPT) 58 12 - 78 U/L       XR CHEST PORT   Final Result      US ABD LTD   Final Result      Nonspecific gallbladder wall thickening, differential diagnosis including   hepatic disease, cholecystitis. No gallstones seen. If there is clinical concern   for cholecystitis consider HIDA scan. Fatty liver.       MRI ABD W MRCP W WO CONT    (Results Pending) Assessment:    Cholestatic Jaundice likely due to advanced alcoholic liver disease. However, somewhat difficult to explain the elevated alkaline phosphatase. Lack of prothrombin time elevation would also argue against hepatic failure. Still concerned about intrahepatic obstructive process. Awaiting MRI of abdomen    Alcohol withdrawal syndrome. Clinically improved      Seizure/History of Alcohol Use Disorder: likely a combination that resulted in the seizure from possible withdrawal.     Vitamin D deficiency    Macrocytic Anemia, folate deficiency. folate is 3.7    Thrombocytopenia    Hyponatremia-resolved    Hypokalemia    Hypertension        Plan:  Discontinue Precedex   Proceed with MRI of abdomen  Transfer to floor later today if remains stable off Precedex, improving      Care Plan discussed with: Patient/Family    Disposition: Continued inpatient care, potential discharge in 1 to 2 days    Total time spent with patient: 30 minutes.     Wesley Spears MD

## 2022-04-04 NOTE — PROGRESS NOTES
Problem: Falls - Risk of  Goal: *Absence of Falls  Description: Document Renea Mcghee Fall Risk and appropriate interventions in the flowsheet.   Outcome: Progressing Towards Goal  Note: Fall Risk Interventions:  Mobility Interventions: Bed/chair exit alarm    Mentation Interventions: Adequate sleep, hydration, pain control,Toileting rounds    Medication Interventions: Bed/chair exit alarm,Evaluate medications/consider consulting pharmacy    Elimination Interventions: Call light in reach,Toileting schedule/hourly rounds    History of Falls Interventions: Bed/chair exit alarm         Problem: Patient Education: Go to Patient Education Activity  Goal: Patient/Family Education  Outcome: Progressing Towards Goal     Problem: General Medical Care Plan  Goal: *Vital signs within specified parameters  Outcome: Progressing Towards Goal  Goal: *Labs within defined limits  Outcome: Progressing Towards Goal  Goal: *Absence of infection signs and symptoms  Outcome: Progressing Towards Goal  Goal: *Optimal pain control at patient's stated goal  Outcome: Progressing Towards Goal  Goal: *Skin integrity maintained  Outcome: Progressing Towards Goal  Goal: *Fluid volume balance  Outcome: Progressing Towards Goal  Goal: *Optimize nutritional status  Outcome: Progressing Towards Goal  Goal: *Anxiety reduced or absent  Outcome: Progressing Towards Goal  Goal: *Progressive mobility and function (eg: ADL's)  Outcome: Progressing Towards Goal     Problem: Patient Education: Go to Patient Education Activity  Goal: Patient/Family Education  Outcome: Progressing Towards Goal     Problem: Non-Violent Restraints  Goal: Removal from restraints as soon as assessed to be safe  Outcome: Progressing Towards Goal  Goal: No harm/injury to patient while restraints in use  Outcome: Progressing Towards Goal  Goal: Patient's dignity will be maintained  Outcome: Progressing Towards Goal  Goal: Patient Interventions  Outcome: Progressing Towards Goal Problem: Delirium Treatment  Goal: *Level of consciousness restored to baseline  Outcome: Progressing Towards Goal  Goal: *Level of environmental perceptions restored to baseline  Outcome: Progressing Towards Goal  Goal: *Sensory perception restored to baseline  Outcome: Progressing Towards Goal  Goal: *Emotional stability restored to baseline  Outcome: Progressing Towards Goal  Goal: *Functional assessment restored to baseline  Outcome: Progressing Towards Goal  Goal: *Absence of falls  Outcome: Progressing Towards Goal  Goal: *Will remain free of delirium, CAM Score negative  Outcome: Progressing Towards Goal  Goal: *Cognitive status will be restored to baseline  Outcome: Progressing Towards Goal  Goal: Interventions  Outcome: Progressing Towards Goal     Problem: Patient Education: Go to Patient Education Activity  Goal: Patient/Family Education  Outcome: Progressing Towards Goal

## 2022-04-04 NOTE — PROGRESS NOTES
TRANSFER - OUT REPORT:    Verbal report given to Laquita(name) on Loralie Sprinkles.  being transferred to (unit) for routine progression of care       Report consisted of patients Situation, Background, Assessment and   Recommendations(SBAR). Information from the following report(s) SBAR, ED Summary, [facility] and Recent Results was reviewed with the receiving nurse. Opportunity for questions and clarification was provided. Patient transported with:   Registered Nurse    Spoke with patient's wife, Lizbet Brown, updated on new room assignment.

## 2022-04-04 NOTE — PROGRESS NOTES
Pending MRI. Discharge disposition remains the same home self care. Family to transport at time of discharge. Will continue to monitor and follow re: discharge needs.            Wander Nunez, MSW

## 2022-04-05 VITALS
TEMPERATURE: 98.4 F | RESPIRATION RATE: 18 BRPM | DIASTOLIC BLOOD PRESSURE: 97 MMHG | OXYGEN SATURATION: 98 % | SYSTOLIC BLOOD PRESSURE: 159 MMHG | HEART RATE: 99 BPM

## 2022-04-05 LAB
ALBUMIN SERPL-MCNC: 2 G/DL (ref 3.5–5)
ALBUMIN SERPL-MCNC: 2 G/DL (ref 3.5–5)
ALBUMIN/GLOB SERPL: 0.6 {RATIO} (ref 1.1–2.2)
ALP SERPL-CCNC: 534 U/L (ref 45–117)
ALT SERPL-CCNC: 58 U/L (ref 12–78)
ANION GAP SERPL CALC-SCNC: 8 MMOL/L (ref 5–15)
AST SERPL W P-5'-P-CCNC: 129 U/L (ref 15–37)
BASOPHILS # BLD: 0 K/UL (ref 0–0.1)
BASOPHILS NFR BLD: 0 % (ref 0–1)
BILIRUB DIRECT SERPL-MCNC: 5.5 MG/DL (ref 0–0.2)
BILIRUB SERPL-MCNC: 6.6 MG/DL (ref 0.2–1)
BUN SERPL-MCNC: 6 MG/DL (ref 6–20)
BUN/CREAT SERPL: 10 (ref 12–20)
CA-I BLD-MCNC: 8.1 MG/DL (ref 8.5–10.1)
CHLORIDE SERPL-SCNC: 106 MMOL/L (ref 97–108)
CO2 SERPL-SCNC: 23 MMOL/L (ref 21–32)
CREAT SERPL-MCNC: 0.6 MG/DL (ref 0.7–1.3)
DIFFERENTIAL METHOD BLD: ABNORMAL
EOSINOPHIL # BLD: 0.2 K/UL (ref 0–0.4)
EOSINOPHIL NFR BLD: 2 % (ref 0–7)
ERYTHROCYTE [DISTWIDTH] IN BLOOD BY AUTOMATED COUNT: 22.1 % (ref 11.5–14.5)
GLOBULIN SER CALC-MCNC: 3.5 G/DL (ref 2–4)
GLUCOSE SERPL-MCNC: 84 MG/DL (ref 65–100)
HCT VFR BLD AUTO: 22.1 % (ref 36.6–50.3)
HGB BLD-MCNC: 7.3 G/DL (ref 12.1–17)
IMM GRANULOCYTES # BLD AUTO: 0 K/UL
IMM GRANULOCYTES NFR BLD AUTO: 0 %
LYMPHOCYTES # BLD: 2.2 K/UL (ref 0.8–3.5)
LYMPHOCYTES NFR BLD: 24 % (ref 12–49)
MCH RBC QN AUTO: 35.4 PG (ref 26–34)
MCHC RBC AUTO-ENTMCNC: 33 G/DL (ref 30–36.5)
MCV RBC AUTO: 107.3 FL (ref 80–99)
MONOCYTES # BLD: 0.4 K/UL (ref 0–1)
MONOCYTES NFR BLD: 4 % (ref 5–13)
MYELOCYTES NFR BLD MANUAL: 7 %
NEUTS SEG # BLD: 5.7 K/UL (ref 1.8–8)
NEUTS SEG NFR BLD: 63 % (ref 32–75)
NRBC # BLD: 0.39 K/UL (ref 0–0.01)
NRBC # BLD: 0.91 K/UL
NRBC BLD MANUAL-RTO: 10 PER 100 WBC
NRBC BLD-RTO: 3.9 PER 100 WBC
PHOSPHATE SERPL-MCNC: 4.2 MG/DL (ref 2.6–4.7)
PLATELET # BLD AUTO: 152 K/UL (ref 150–400)
PLATELET COMMENTS,PCOM: ABNORMAL
PMV BLD AUTO: 11.1 FL (ref 8.9–12.9)
POTASSIUM SERPL-SCNC: 4 MMOL/L (ref 3.5–5.1)
PROT SERPL-MCNC: 5.5 G/DL (ref 6.4–8.2)
RBC # BLD AUTO: 2.06 M/UL (ref 4.1–5.7)
RBC MORPH BLD: ABNORMAL
RBC MORPH BLD: ABNORMAL
SODIUM SERPL-SCNC: 137 MMOL/L (ref 136–145)
WBC # BLD AUTO: 9.1 K/UL (ref 4.1–11.1)

## 2022-04-05 PROCEDURE — 80076 HEPATIC FUNCTION PANEL: CPT

## 2022-04-05 PROCEDURE — 36415 COLL VENOUS BLD VENIPUNCTURE: CPT

## 2022-04-05 PROCEDURE — 80069 RENAL FUNCTION PANEL: CPT

## 2022-04-05 PROCEDURE — 74011000258 HC RX REV CODE- 258: Performed by: INTERNAL MEDICINE

## 2022-04-05 PROCEDURE — 85025 COMPLETE CBC W/AUTO DIFF WBC: CPT

## 2022-04-05 PROCEDURE — 74011250637 HC RX REV CODE- 250/637: Performed by: INTERNAL MEDICINE

## 2022-04-05 PROCEDURE — 74011000250 HC RX REV CODE- 250: Performed by: HOSPITALIST

## 2022-04-05 PROCEDURE — 74011250637 HC RX REV CODE- 250/637: Performed by: HOSPITALIST

## 2022-04-05 RX ORDER — IBUPROFEN 200 MG
1 TABLET ORAL DAILY
Qty: 30 PATCH | Refills: 0 | Status: SHIPPED | OUTPATIENT
Start: 2022-04-06 | End: 2022-05-06

## 2022-04-05 RX ORDER — FOLIC ACID 1 MG/1
1 TABLET ORAL DAILY
Qty: 30 TABLET | Refills: 0 | Status: SHIPPED | OUTPATIENT
Start: 2022-04-06 | End: 2022-05-06

## 2022-04-05 RX ORDER — ASPIRIN 325 MG/1
100 TABLET, FILM COATED ORAL DAILY
Qty: 30 TABLET | Refills: 0 | Status: SHIPPED | OUTPATIENT
Start: 2022-04-06 | End: 2022-05-06

## 2022-04-05 RX ADMIN — FOLIC ACID 1 MG: 1 TABLET ORAL at 08:18

## 2022-04-05 RX ADMIN — THIAMINE HCL TAB 100 MG 100 MG: 100 TAB at 08:18

## 2022-04-05 RX ADMIN — MULTIPLE VITAMINS W/ MINERALS TAB 1 TABLET: TAB at 08:17

## 2022-04-05 RX ADMIN — DEXTROSE AND SODIUM CHLORIDE 75 ML/HR: 5; 900 INJECTION, SOLUTION INTRAVENOUS at 08:24

## 2022-04-05 RX ADMIN — SODIUM CHLORIDE, PRESERVATIVE FREE 10 ML: 5 INJECTION INTRAVENOUS at 05:57

## 2022-04-05 RX ADMIN — Medication 2000 UNITS: at 08:18

## 2022-04-05 RX ADMIN — SODIUM CHLORIDE, PRESERVATIVE FREE 10 ML: 5 INJECTION INTRAVENOUS at 02:02

## 2022-04-05 NOTE — DISCHARGE SUMMARY
Physician Discharge Summary     Patient ID:    Kyara Blackman  [id number]  42 y.o.  1980    Admit date: 3/31/2022    Discharge date : 4/5/2022    Chronic Diagnoses:    Problem List as of 4/5/2022 Date Reviewed: 3/31/2022          Codes Class Noted - Resolved    Hyperbilirubinemia ICD-10-CM: E80.6  ICD-9-CM: 782.4  3/31/2022 - Present        Seizure ([facility] [address] ICD-10-CM: R56.9  ICD-9-CM: 780.39  3/31/2022 - Present        Hepatitis, acute ICD-10-CM: B17.9  ICD-9-CM: 570  3/31/2022 - Present        Hypoxia ICD-10-CM: R09.02  ICD-9-CM: 799.02  10/7/2021 - Present        Positive D dimer ICD-10-CM: R79.89  ICD-9-CM: 790.92  10/7/2021 - Present        Pneumonia due to COVID-19 virus ICD-10-CM: U07.1, J12.82  ICD-9-CM: 480.8, 079.89  10/7/2021 - Present          22    Final Diagnoses:   Seizure ([facility] [address] [R56.9]  Hepatitis, acute [B17.9]  Chronic alcohol abuse  Hyperbilirubinemia due to alcohol abuse and intrahepatic cholestasis  Vitamin D deficiency  Thrombocytopenia  Hypokalemia. Repleted    Reason for Hospitalization:  Seizure-like activity      Hospital Course:   42 y. o. male with remote history of seizures 3 years ago one time presents to the referring facility emergency room after he developed a seizure-like activity with fall and altered mental status weakness by the family. Agnieszka Burris denies any fever, chills.  Denies any nausea or vomiting.  Little more than 2 weeks ago on March 11 presented to the emergency room complaining of feeling dizzy and lightheaded when he was trying to move boxes.  At that time on laboratory data he has mild metabolic acidosis and bilirubin was elevated at 8.  Thought that he was may be drinking alcohol.  Today on the laboratory data he has significantly elevated bilirubin, and due to seizure-like activity  and needing a neurological evaluation transferred here for further management.     Patient states that he works 5 days a week, on 2 days that he is off he drink alcohol.  He drinks 40 ounce beer 2 cans daily only those 2 days. However, his wife stated that patient has been drinking every day, all day recently. Total bilirubin noted to be 6.6 with a direct component 5.5. MRI of the abdomen unremarkable. Seen in consultation by gastroenterologist and advised alcohol abstinence. Encourage close follow-up outpatient with gastroenterologist for close monitoring of LFTs. Discharge Medications:   Current Discharge Medication List      START taking these medications    Details   folic acid (FOLVITE) 1 mg tablet Take 1 Tablet by mouth daily for 30 days. Qty: 30 Tablet, Refills: 0  Start date: 2022, End date: 2022      nicotine (NICODERM CQ) 21 mg/24 hr 1 Patch by TransDERmal route daily for 30 days. Qty: 30 Patch, Refills: 0  Start date: 2022, End date: 2022      thiamine mononitrate (B-1) 100 mg tablet Take 1 Tablet by mouth daily for 30 days. Qty: 30 Tablet, Refills: 0  Start date: 2022, End date: 2022               Follow up Care:    1. None in 1-2 weeks. Please call to set up an appointment shortly after discharge. Diet:  Cardiac Diet    Disposition:  Home. Advanced Directive:   FULL    DNR      Discharge Exam:  Visit Vitals  BP (!) 159/97 (BP 1 Location: Left upper arm, BP Patient Position: At rest)   Pulse 99   Temp 98.4 °F (36.9 °C)   Resp 18   SpO2 98%      O2 Device: None (Room air)    Temp (24hrs), Av.5 °F (36.9 °C), Min:98.1 °F (36.7 °C), Max:99.3 °F (37.4 °C)    No intake/output data recorded.  1901 -  0700  In: 2878.9 [P.O.:1360; I.V.:1518.9]  Out: 1100 [Urine:1100]    General:  Alert, cooperative, no distress, appears stated age. Lungs:   Clear to auscultation bilaterally. Chest wall:  No tenderness or deformity. Heart:  Regular rate and rhythm, S1, S2 normal, no murmur, click, rub or gallop. Abdomen:   Soft, non-tender. Bowel sounds normal. No masses,  No organomegaly.    Extremities: Extremities normal, atraumatic, no cyanosis or edema. Pulses: 2+ and symmetric all extremities. Skin: Skin color, texture, turgor normal. No rashes or lesions   Neurologic: CNII-XII intact. No gross sensory or motor deficits         CONSULTATIONS: GI    Significant Diagnostic Studies:   3/31/2022: BUN 8 mg/dL (Ref range: 6 - 20 mg/dL); Calcium 7.9 mg/dL (L; Ref range: 8.5 - 10.1 mg/dL); CO2 21 mmol/L (Ref range: 21 - 32 mmol/L); Creatinine 1.15 mg/dL (Ref range: 0.70 - 1.30 mg/dL); Glucose 147 mg/dL (H; Ref range: 65 - 100 mg/dL); HCT 31.2 % (L; Ref range: 41 - 53 %); HGB 10.4 g/dL (L; Ref range: 13.5 - 17.5 g/dL); Potassium 4.6 mmol/L (Ref range: 3.5 - 5.1 mmol/L); Sodium 131 mmol/L (L; Ref range: 136 - 145 mmol/L)  4/1/2022: BUN 3 mg/dL (L; Ref range: 6 - 20 mg/dL); Calcium 7.7 mg/dL (L; Ref range: 8.5 - 10.1 mg/dL); CO2 24 mmol/L (Ref range: 21 - 32 mmol/L); Creatinine 1.08 mg/dL (Ref range: 0.70 - 1.30 mg/dL); Glucose 185 mg/dL (H; Ref range: 65 - 100 mg/dL); HCT 24.5 % (L; Ref range: 36.6 - 50.3 %); HGB 8.4 g/dL (L; Ref range: 12.1 - 17.0 g/dL); Potassium 2.7 mmol/L (LL; Ref range: 3.5 - 5.1 mmol/L); Sodium 133 mmol/L (L; Ref range: 136 - 145 mmol/L)  Recent Labs     04/05/22 0319 04/04/22  0355   WBC 9.1 5.9   HGB 7.3* 7.7*   HCT 22.1* 23.1*    135*     Recent Labs     04/05/22 0319 04/04/22 0355 04/03/22 0334    137 138   K 4.0 4.0 2.7*    110* 106   CO2 23 19* 22   BUN 6 6 5*   CREA 0.60* 0.62* 0.69*   GLU 84 116* 113*   CA 8.1* 7.4* 7.8*   PHOS 4.2 1.9* 1.9*     Recent Labs     04/05/22 0320 04/05/22 0319 04/04/22 0355 04/03/22 0334 04/03/22 0334   ALT 58  --  58  --  56   *  --  590*  --  605*   TBILI 6.6*  --  7.9*  --  11.2*   TP 5.5*  --  4.8*  --  5.6*   ALB 2.0* 2.0* 1.8*  1.7*   < > 1.9*  1.9*   GLOB 3.5  --  3.0  --  3.7    < > = values in this interval not displayed.      Recent Labs     04/03/22 0334   INR 1.0   PTP 13.1      No results for input(s): FE, TIBC, PSAT, FERR in the last 72 hours. No results for input(s): PH, PCO2, PO2 in the last 72 hours. No results for input(s): CPK, CKMB in the last 72 hours.     No lab exists for component: TROPONINI  Lab Results   Component Value Date/Time    Glucose (POC) 135 (H) 10/08/2021 11:10 AM    Glucose (POC) 111 10/08/2021 07:41 AM       Total Time: 35 minutes    Signed:  Ines Mclaughlin MD  4/5/2022  3:14 PM

## 2022-04-05 NOTE — PROGRESS NOTES
Pt iv discontinued without difficulty, pt tolerated well. Pt being discharged home with no services; all of his belongings packed up with him and he is awaiting discharge paperwork with Juan Pablo Day, discharging nurse. Pt denied any concerns or pain issues at this time. No further needs noted.

## 2022-04-05 NOTE — PROGRESS NOTES
Hospitalist Progress Note               Daily Progress Note: 4/5/2022      Subjective:   Per admitting physician    42 y.o. male with remote history of seizures 3 years ago one time presents to the referring facility emergency room after he developed a seizure-like activity with fall and altered mental status weakness by the family. He denies any fever, chills. Denies any nausea or vomiting. Little more than 2 weeks ago on March 11 presented to the emergency room complaining of feeling dizzy and lightheaded when he was trying to move boxes. At that time on laboratory data he has mild metabolic acidosis and bilirubin was elevated at 8. Thought that he was may be drinking alcohol. Today on the laboratory data he has significantly elevated bilirubin, and due to seizure-like activity  and needing a neurological evaluation transferred here for further management.     Patient states that he works 5 days a week, on 2 days that he is off he drink alcohol. He drinks 40 ounce beer 2 cans daily only those 2 days. However, his wife stated that patient has been drinking every day, all day recently. He denies any drug abuse.      -----      Patient seen and examined at bedside, one-to-one sitter is present, and no over night events per nursing staff. Patient is currently on Precedex 0.4 mcg/kg/hr, following CIWA protocol due to alcohol withdrawal and altered mental status. As I entered the room patient was sleeping and resting comfortably, however he was able to wake up and have a conversation. He was acting appropriately and was cooperative during my interview and physical exam. At this time he has no new complaints and denies any subjective symptoms. Potassium improved yesterday after repletion therapy to 3.0 however, it has decreased back to 2.7. ALT stable, AST has improved and is trending to a normal range, in addition ALK PHOS is also improving, yesterday it was 706 today it is 605. Problem List:  Problem List as of 4/5/2022 Date Reviewed: 3/31/2022          Codes Class Noted - Resolved    Hyperbilirubinemia ICD-10-CM: E80.6  ICD-9-CM: 782.4  3/31/2022 - Present        Seizure ([address] ICD-10-CM: R56.9  ICD-9-CM: 780.39  3/31/2022 - Present        Hepatitis, acute ICD-10-CM: B17.9  ICD-9-CM: 570  3/31/2022 - Present        Hypoxia ICD-10-CM: R09.02  ICD-9-CM: 799.02  10/7/2021 - Present        Positive D dimer ICD-10-CM: R79.89  ICD-9-CM: 790.92  10/7/2021 - Present        Pneumonia due to COVID-19 virus ICD-10-CM: U07.1, J12.82  ICD-9-CM: 480.8, 079.89  10/7/2021 - Present              Medications reviewed  Current Facility-Administered Medications   Medication Dose Route Frequency    nicotine (NICODERM CQ) 21 mg/24 hr patch 1 Patch  1 Patch TransDERmal DAILY    cholecalciferol (VITAMIN D3) (1000 Units /25 mcg) tablet 2,000 Units  2,000 Units Oral DAILY    hydrALAZINE (APRESOLINE) 20 mg/mL injection 10 mg  10 mg IntraVENous Q4H PRN    LORazepam (ATIVAN) injection 2 mg  2 mg IntraVENous Q1H PRN    LORazepam (ATIVAN) injection 4 mg  4 mg IntraVENous Q1H PRN    dextrose 5% and 0.9% NaCl infusion  75 mL/hr IntraVENous CONTINUOUS    folic acid (FOLVITE) tablet 1 mg  1 mg Oral DAILY    thiamine mononitrate (B-1) tablet 100 mg  100 mg Oral DAILY    multivitamin, tx-iron-ca-min (THERA-M w/ IRON) tablet 1 Tablet  1 Tablet Oral DAILY    dexmedeTOMidine in 0.9 % NaCl (PRECEDEX) 400 mcg/100 mL (4 mcg/mL) infusion soln  0.1-1.5 mcg/kg/hr IntraVENous TITRATE    sodium chloride (NS) flush 5-40 mL  5-40 mL IntraVENous Q8H    sodium chloride (NS) flush 5-40 mL  5-40 mL IntraVENous PRN    ondansetron (ZOFRAN ODT) tablet 4 mg  4 mg Oral Q6H PRN    Or    ondansetron (ZOFRAN) injection 4 mg  4 mg IntraVENous Q6H PRN       Review of Systems:   Not required    Objective:   Physical Exam:     Visit Vitals  BP (!) 159/99 (BP 1 Location: Left upper arm, BP Patient Position: At rest)   Pulse 99 Temp 99.3 °F (37.4 °C)   Resp 18   SpO2 99%      O2 Device: None (Room air)    Temp (24hrs), Av.7 °F (37.1 °C), Min:98.1 °F (36.7 °C), Max:99.3 °F (37.4 °C)    No intake/output data recorded.  1901 -  0700  In: 2878.9 [P.O.:1360; I.V.:1518.9]  Out: 1100 [Urine:1100]      Physical Exam  Vitals and nursing note reviewed. Constitutional:       General: He is sleeping. Appearance: Normal appearance. He is normal weight. Interventions: He is sedated and restrained. Eyes:      General: Scleral icterus present. Comments: Scleral icterus has improved since initial examination of patient. Cardiovascular:      Rate and Rhythm: Normal rate and regular rhythm. Pulses: Normal pulses. Heart sounds: Normal heart sounds. Pulmonary:      Effort: Pulmonary effort is normal. No tachypnea. Breath sounds: Normal breath sounds. Abdominal:      General: Abdomen is flat. Bowel sounds are normal. There is no distension. Palpations: Abdomen is soft. Tenderness: There is no abdominal tenderness. There is no guarding or rebound. Skin:     General: Skin is warm and dry. Data Review:       Recent Days:  Recent Labs     22  033   WBC 9.1 5.9 4.5   HGB 7.3* 7.7* 8.2*   HCT 22.1* 23.1* 24.3*    135* 138*     Recent Labs     22  0320 22  0334 22  0334   NA  --  137 137  --  138   K  --  4.0 4.0  --  2.7*   CL  --  106 110*  --  106   CO2  --  23 19*  --  22   GLU  --  84 116*  --  113*   BUN  --  6 6  --  5*   CREA  --  0.60* 0.62*  --  0.69*   CA  --  8.1* 7.4*  --  7.8*   PHOS  --  4.2 1.9*  --  1.9*   ALB 2.0* 2.0* 1.8*  1.7*   < > 1.9*  1.9*   TBILI 6.6*  --  7.9*  --  11.2*   ALT 58  --  58  --  56   INR  --   --   --   --  1.0    < > = values in this interval not displayed.      No results for input(s): PH, PCO2, PO2, HCO3, FIO2 in the last 72 hours. 24 Hour Results:  Recent Results (from the past 24 hour(s))   CBC WITH AUTOMATED DIFF    Collection Time: 04/05/22  3:19 AM   Result Value Ref Range    WBC 9.1 4.1 - 11.1 K/uL    RBC 2.06 (L) 4.10 - 5.70 M/uL    HGB 7.3 (L) 12.1 - 17.0 g/dL    HCT 22.1 (L) 36.6 - 50.3 %    .3 (H) 80.0 - 99.0 FL    MCH 35.4 (H) 26.0 - 34.0 PG    MCHC 33.0 30.0 - 36.5 g/dL    RDW 22.1 (H) 11.5 - 14.5 %    PLATELET 046 259 - 687 K/uL    MPV 11.1 8.9 - 12.9 FL    NRBC 3.9 (H) 0.0  WBC    ABSOLUTE NRBC 0.39 (H) 0.00 - 0.01 K/uL    NEUTROPHILS 63 32 - 75 %    LYMPHOCYTES 24 12 - 49 %    MONOCYTES 4 (L) 5 - 13 %    EOSINOPHILS 2 0 - 7 %    BASOPHILS 0 0 - 1 %    MYELOCYTES 7 (H) 0 %    NRBC 10.0  WBC    IMMATURE GRANULOCYTES 0 %    ABS. NEUTROPHILS 5.7 1.8 - 8.0 K/UL    ABS. LYMPHOCYTES 2.2 0.8 - 3.5 K/UL    ABS. MONOCYTES 0.4 0.0 - 1.0 K/UL    ABS. EOSINOPHILS 0.2 0.0 - 0.4 K/UL    ABS. BASOPHILS 0.0 0.0 - 0.1 K/UL    ABSOLUTE NRBC 0.91 K/uL    ABS. IMM.  GRANS. 0.0 K/UL    DF Manual      PLATELET COMMENTS Large Platelets      RBC COMMENTS Polychromasia  1+        RBC COMMENTS Anisocytosis  2+       RENAL FUNCTION PANEL    Collection Time: 04/05/22  3:19 AM   Result Value Ref Range    Sodium 137 136 - 145 mmol/L    Potassium 4.0 3.5 - 5.1 mmol/L    Chloride 106 97 - 108 mmol/L    CO2 23 21 - 32 mmol/L    Anion gap 8 5 - 15 mmol/L    Glucose 84 65 - 100 mg/dL    BUN 6 6 - 20 mg/dL    Creatinine 0.60 (L) 0.70 - 1.30 mg/dL    BUN/Creatinine ratio 10 (L) 12 - 20      GFR est AA >60 >60 ml/min/1.73m2    GFR est non-AA >60 >60 ml/min/1.73m2    Calcium 8.1 (L) 8.5 - 10.1 mg/dL    Phosphorus 4.2 2.6 - 4.7 mg/dL    Albumin 2.0 (L) 3.5 - 5.0 g/dL   HEPATIC FUNCTION PANEL    Collection Time: 04/05/22  3:20 AM   Result Value Ref Range    Protein, total 5.5 (L) 6.4 - 8.2 g/dL    Albumin 2.0 (L) 3.5 - 5.0 g/dL    Globulin 3.5 2.0 - 4.0 g/dL    A-G Ratio 0.6 (L) 1.1 - 2.2      Bilirubin, total 6.6 (H) 0.2 - 1.0 mg/dL    Bilirubin, direct 5.5 (H) 0.0 - 0.2 mg/dL    Alk. phosphatase 534 (H) 45 - 117 U/L    AST (SGOT) 129 (H) 15 - 37 U/L    ALT (SGPT) 58 12 - 78 U/L       XR CHEST PORT   Final Result      US ABD LTD   Final Result      Nonspecific gallbladder wall thickening, differential diagnosis including   hepatic disease, cholecystitis. No gallstones seen. If there is clinical concern   for cholecystitis consider HIDA scan. Fatty liver. MRI ABD W MRCP W WO CONT    (Results Pending)        Assessment:    Cholestatic Jaundice likely due to advanced alcoholic liver disease. Alcohol withdrawal syndrome. Clinically improved      Seizure/History of Alcohol Use Disorder: likely a combination that resulted in the seizure from possible withdrawal.     Vitamin D deficiency    Macrocytic Anemia, folate deficiency. folate is 3.7    Thrombocytopenia    Hyponatremia-resolved    Hypokalemia. Repleted    Hypertension        Plan:  Continue supportive care   Follow up  MRI of abdomen  Counselled on alcohol cessation  Anticipate discharge to home in am if clinically improved    Care Plan discussed with: Patient/Family    Disposition: Continued inpatient care, potential discharge in 1 day    Total time spent with patient: 30 minutes.     Yolanda Moya MD

## 2022-04-05 NOTE — PROGRESS NOTES
*ATTENTION:  This note has been created by a medical student for educational purposes only. Please do not refer to the content of this note for clinical decision-making, billing, or other purposes. Please see attending physicians note to obtain clinical information on this patient. *      Hospitalist Progress Note    Subjective:   Daily Progress Note: 4/5/2022 9:25 AM    42 y. o. male with remote history of seizures 3 years ago one time presents to the referring facility emergency room after he developed a seizure-like activity with fall and altered mental status weakness by the family. Simone Key denies any fever, chills.  Denies any nausea or vomiting.  Little more than 2 weeks ago on March 11 presented to the emergency room complaining of feeling dizzy and lightheaded when he was trying to move boxes.  At that time on laboratory data he has mild metabolic acidosis and bilirubin was elevated at 8.  Thought that he was may be drinking alcohol.  Today on the laboratory data he has significantly elevated bilirubin, and due to seizure-like activity  and needing a neurological evaluation transferred here for further management.     Patient states that he works 5 days a week, on 2 days that he is off he drink alcohol.  He drinks 40 ounce beer 2 cans daily only those 2 days. However, his wife stated that patient has been drinking every day, all day recently.  Simone Key denies any drug abuse. 4/5  Patient seen today awake and alert eating breakfast. Off 1to1 supervision. No new complaints at this time. Patient appeared to be focus continuing  alcohol cessation following discharge. He denies vitaliy headaches, tremors, nausea, vomiting, abdominal pain, chest pain , and shortness of breathe. Bilirubin and LFT trending down.      Current Facility-Administered Medications   Medication Dose Route Frequency    nicotine (NICODERM CQ) 21 mg/24 hr patch 1 Patch  1 Patch TransDERmal DAILY    cholecalciferol (VITAMIN D3) (1000 Units /25 mcg) tablet 2,000 Units  2,000 Units Oral DAILY    hydrALAZINE (APRESOLINE) 20 mg/mL injection 10 mg  10 mg IntraVENous Q4H PRN    LORazepam (ATIVAN) injection 2 mg  2 mg IntraVENous Q1H PRN    LORazepam (ATIVAN) injection 4 mg  4 mg IntraVENous Q1H PRN    dextrose 5% and 0.9% NaCl infusion  75 mL/hr IntraVENous CONTINUOUS    folic acid (FOLVITE) tablet 1 mg  1 mg Oral DAILY    thiamine mononitrate (B-1) tablet 100 mg  100 mg Oral DAILY    multivitamin, tx-iron-ca-min (THERA-M w/ IRON) tablet 1 Tablet  1 Tablet Oral DAILY    dexmedeTOMidine in 0.9 % NaCl (PRECEDEX) 400 mcg/100 mL (4 mcg/mL) infusion soln  0.1-1.5 mcg/kg/hr IntraVENous TITRATE    sodium chloride (NS) flush 5-40 mL  5-40 mL IntraVENous Q8H    sodium chloride (NS) flush 5-40 mL  5-40 mL IntraVENous PRN    ondansetron (ZOFRAN ODT) tablet 4 mg  4 mg Oral Q6H PRN    Or    ondansetron (ZOFRAN) injection 4 mg  4 mg IntraVENous Q6H PRN        Review of Systems  A comprehensive review of systems was negative except for that written in the HPI. Objective:     Visit Vitals  BP (!) 159/99 (BP 1 Location: Left upper arm, BP Patient Position: At rest)   Pulse 99   Temp 99.3 °F (37.4 °C)   Resp 18   SpO2 99%      O2 Device: None (Room air)    Temp (24hrs), Av.7 °F (37.1 °C), Min:98.1 °F (36.7 °C), Max:99.3 °F (37.4 °C)      No intake/output data recorded.  1901 -  0700  In: 2878.9 [P.O.:1360; I.V.:1518.9]  Out: 1100 [Urine:1100]    Visit Vitals  BP (!) 159/99 (BP 1 Location: Left upper arm, BP Patient Position: At rest)   Pulse 99   Temp 99.3 °F (37.4 °C)   Resp 18   SpO2 99%     Physical Exam  Vitals and nursing note reviewed. Constitutional:       General: He is sleeping. Appearance: Normal appearance. He is normal weight. Interventions: He is sedated and restrained. Eyes:      General: Scleral icterus present. Comments:  Cardiovascular:      Rate and Rhythm: Normal rate and regular rhythm.       Pulses: Normal pulses. Heart sounds: Normal heart sounds. Pulmonary:      Effort: Pulmonary effort is normal. No tachypnea. Breath sounds: Normal breath sounds. Abdominal:      General: Abdomen is flat. Bowel sounds are normal. There is no distension. Palpations: Abdomen is soft. Tenderness: There is no abdominal tenderness. There is no guarding or rebound. Skin:     General: Skin is warm and dry.       Additional comments:None    Data Review    Recent Results (from the past 24 hour(s))   CBC WITH AUTOMATED DIFF    Collection Time: 04/05/22  3:19 AM   Result Value Ref Range    WBC 9.1 4.1 - 11.1 K/uL    RBC 2.06 (L) 4.10 - 5.70 M/uL    HGB 7.3 (L) 12.1 - 17.0 g/dL    HCT 22.1 (L) 36.6 - 50.3 %    .3 (H) 80.0 - 99.0 FL    MCH 35.4 (H) 26.0 - 34.0 PG    MCHC 33.0 30.0 - 36.5 g/dL    RDW 22.1 (H) 11.5 - 14.5 %    PLATELET 826 820 - 611 K/uL    MPV 11.1 8.9 - 12.9 FL    NRBC 3.9 (H) 0.0  WBC    ABSOLUTE NRBC 0.39 (H) 0.00 - 0.01 K/uL    NEUTROPHILS 63 32 - 75 %    LYMPHOCYTES 24 12 - 49 %    MONOCYTES 4 (L) 5 - 13 %    EOSINOPHILS 2 0 - 7 %    BASOPHILS 0 0 - 1 %    MYELOCYTES 7 (H) 0 %    NRBC 10.0  WBC    IMMATURE GRANULOCYTES 0 %    ABS. NEUTROPHILS 5.7 1.8 - 8.0 K/UL    ABS. LYMPHOCYTES 2.2 0.8 - 3.5 K/UL    ABS. MONOCYTES 0.4 0.0 - 1.0 K/UL    ABS. EOSINOPHILS 0.2 0.0 - 0.4 K/UL    ABS. BASOPHILS 0.0 0.0 - 0.1 K/UL    ABSOLUTE NRBC 0.91 K/uL    ABS. IMM.  GRANS. 0.0 K/UL    DF Manual      PLATELET COMMENTS Large Platelets      RBC COMMENTS Polychromasia  1+        RBC COMMENTS Anisocytosis  2+       RENAL FUNCTION PANEL    Collection Time: 04/05/22  3:19 AM   Result Value Ref Range    Sodium 137 136 - 145 mmol/L    Potassium 4.0 3.5 - 5.1 mmol/L    Chloride 106 97 - 108 mmol/L    CO2 23 21 - 32 mmol/L    Anion gap 8 5 - 15 mmol/L    Glucose 84 65 - 100 mg/dL    BUN 6 6 - 20 mg/dL    Creatinine 0.60 (L) 0.70 - 1.30 mg/dL    BUN/Creatinine ratio 10 (L) 12 - 20      GFR est AA >60 >60 ml/min/1.73m2    GFR est non-AA >60 >60 ml/min/1.73m2    Calcium 8.1 (L) 8.5 - 10.1 mg/dL    Phosphorus 4.2 2.6 - 4.7 mg/dL    Albumin 2.0 (L) 3.5 - 5.0 g/dL   HEPATIC FUNCTION PANEL    Collection Time: 04/05/22  3:20 AM   Result Value Ref Range    Protein, total 5.5 (L) 6.4 - 8.2 g/dL    Albumin 2.0 (L) 3.5 - 5.0 g/dL    Globulin 3.5 2.0 - 4.0 g/dL    A-G Ratio 0.6 (L) 1.1 - 2.2      Bilirubin, total 6.6 (H) 0.2 - 1.0 mg/dL    Bilirubin, direct 5.5 (H) 0.0 - 0.2 mg/dL    Alk. phosphatase 534 (H) 45 - 117 U/L    AST (SGOT) 129 (H) 15 - 37 U/L    ALT (SGPT) 58 12 - 78 U/L         Assessment/Plan:     Active Problems:    Hyperbilirubinemia (3/31/2022)      Seizure ([address] (3/31/2022)      Hepatitis, acute (3/31/2022)    4. Cholestatic Jaundice likely due to advanced alcoholic liver disease. However, somewhat difficult to explain the elevated alkaline phosphatase. Lack of prothrombin time elevation would also argue against hepatic failure. Still concerned about intrahepatic obstructive process. Patient needs MRI  5. Alcohol withdrawal syndrome-Resolving   6. Hxof ETOH Use Disorder   7. Vitamin D deficiency  8. Macrocytic Anemia, folate deficiency. folate is 3.7  9. Thrombocytopenia  10. Hyponatremia-resolved  11. Hypokalemia-Resolved  12. Hypertension  13. Elevated LFT      PLAN    1. Continue IVF and repeat labs  2. Continue current meds  3. Continue to monitor HTN  4. Continue CIWA protocol  4.  Plan for d/c

## 2022-04-05 NOTE — PROGRESS NOTES
CM reviewed chart and spoke to primary physician. Patient pending MRI and will plan for discharge either today or tomorrow depending on results. Patient's plan is to return home/self at discharge. CM will continue to follow.

## 2022-04-05 NOTE — PROGRESS NOTES
Problem: Falls - Risk of  Goal: *Absence of Falls  Description: Document Umer Guerra Fall Risk and appropriate interventions in the flowsheet.   Outcome: Resolved/Met  Note: Fall Risk Interventions:  Mobility Interventions: Bed/chair exit alarm    Mentation Interventions: Evaluate medications/consider consulting pharmacy    Medication Interventions: Evaluate medications/consider consulting pharmacy    Elimination Interventions: Call light in reach    History of Falls Interventions: Evaluate medications/consider consulting pharmacy         Problem: Patient Education: Go to Patient Education Activity  Goal: Patient/Family Education  Outcome: Resolved/Met     Problem: General Medical Care Plan  Goal: *Vital signs within specified parameters  Outcome: Resolved/Met  Goal: *Labs within defined limits  Outcome: Resolved/Met  Goal: *Absence of infection signs and symptoms  Outcome: Resolved/Met  Goal: *Optimal pain control at patient's stated goal  Outcome: Resolved/Met  Goal: *Skin integrity maintained  Outcome: Resolved/Met  Goal: *Fluid volume balance  Outcome: Resolved/Met  Goal: *Optimize nutritional status  Outcome: Resolved/Met  Goal: *Anxiety reduced or absent  Outcome: Resolved/Met  Goal: *Progressive mobility and function (eg: ADL's)  Outcome: Resolved/Met     Problem: Patient Education: Go to Patient Education Activity  Goal: Patient/Family Education  Outcome: Resolved/Met     Problem: Non-Violent Restraints  Goal: Removal from restraints as soon as assessed to be safe  Outcome: Resolved/Met  Goal: No harm/injury to patient while restraints in use  Outcome: Resolved/Met  Goal: Patient's dignity will be maintained  Outcome: Resolved/Met  Goal: Patient Interventions  Outcome: Resolved/Met     Problem: Delirium Treatment  Goal: *Level of consciousness restored to baseline  Outcome: Resolved/Met  Goal: *Level of environmental perceptions restored to baseline  Outcome: Resolved/Met  Goal: *Sensory perception restored to baseline  Outcome: Resolved/Met  Goal: *Emotional stability restored to baseline  Outcome: Resolved/Met  Goal: *Functional assessment restored to baseline  Outcome: Resolved/Met  Goal: *Absence of falls  Outcome: Resolved/Met  Goal: *Will remain free of delirium, CAM Score negative  Outcome: Resolved/Met  Goal: *Cognitive status will be restored to baseline  Outcome: Resolved/Met  Goal: Interventions  Outcome: Resolved/Met     Problem: Patient Education: Go to Patient Education Activity  Goal: Patient/Family Education  Outcome: Resolved/Met

## 2024-05-02 ENCOUNTER — HOSPITAL ENCOUNTER (EMERGENCY)
Facility: HOSPITAL | Age: 44
Discharge: LEFT BEFORE DISCHARGE | End: 2024-05-03
Attending: EMERGENCY MEDICINE
Payer: MEDICAID

## 2024-05-02 VITALS
RESPIRATION RATE: 20 BRPM | TEMPERATURE: 98.2 F | BODY MASS INDEX: 19.9 KG/M2 | HEIGHT: 67 IN | SYSTOLIC BLOOD PRESSURE: 128 MMHG | DIASTOLIC BLOOD PRESSURE: 86 MMHG | OXYGEN SATURATION: 97 % | WEIGHT: 126.8 LBS | HEART RATE: 85 BPM

## 2024-05-02 DIAGNOSIS — F10.920 ACUTE ALCOHOLIC INTOXICATION WITHOUT COMPLICATION (HCC): ICD-10-CM

## 2024-05-02 DIAGNOSIS — I16.0 HYPERTENSIVE URGENCY: Primary | ICD-10-CM

## 2024-05-02 DIAGNOSIS — R07.9 CHEST PAIN, UNSPECIFIED TYPE: ICD-10-CM

## 2024-05-02 LAB
ALBUMIN SERPL-MCNC: 3.8 G/DL (ref 3.5–5)
ALBUMIN/GLOB SERPL: 1 (ref 1.1–2.2)
ALP SERPL-CCNC: 142 U/L (ref 45–117)
ALT SERPL-CCNC: 60 U/L (ref 12–78)
AMPHET UR QL SCN: NEGATIVE
ANION GAP SERPL CALC-SCNC: 10 MMOL/L (ref 5–15)
AST SERPL W P-5'-P-CCNC: 84 U/L (ref 15–37)
BARBITURATES UR QL SCN: NEGATIVE
BASOPHILS # BLD: 0.1 K/UL (ref 0–0.1)
BASOPHILS NFR BLD: 1 % (ref 0–1)
BENZODIAZ UR QL: NEGATIVE
BILIRUB SERPL-MCNC: 0.5 MG/DL (ref 0.2–1)
BUN SERPL-MCNC: 9 MG/DL (ref 6–20)
BUN/CREAT SERPL: 11 (ref 12–20)
CA-I BLD-MCNC: 8.6 MG/DL (ref 8.5–10.1)
CANNABINOIDS UR QL SCN: POSITIVE
CHLORIDE SERPL-SCNC: 103 MMOL/L (ref 97–108)
CO2 SERPL-SCNC: 30 MMOL/L (ref 21–32)
COCAINE UR QL SCN: NEGATIVE
CREAT SERPL-MCNC: 0.82 MG/DL (ref 0.7–1.3)
DIFFERENTIAL METHOD BLD: ABNORMAL
EOSINOPHIL # BLD: 0.1 K/UL (ref 0–0.4)
EOSINOPHIL NFR BLD: 2 % (ref 0–7)
ERYTHROCYTE [DISTWIDTH] IN BLOOD BY AUTOMATED COUNT: 15.8 % (ref 11.5–14.5)
ETHANOL SERPL-MCNC: 479 MG/DL (ref 0–0.08)
GLOBULIN SER CALC-MCNC: 4 G/DL (ref 2–4)
GLUCOSE SERPL-MCNC: 98 MG/DL (ref 65–100)
HCT VFR BLD AUTO: 40.3 % (ref 36.6–50.3)
HGB BLD-MCNC: 14.6 G/DL (ref 12.1–17)
IMM GRANULOCYTES # BLD AUTO: 0 K/UL (ref 0–0.04)
IMM GRANULOCYTES NFR BLD AUTO: 0 % (ref 0–0.5)
LYMPHOCYTES # BLD: 1.6 K/UL (ref 0.8–3.5)
LYMPHOCYTES NFR BLD: 36 % (ref 12–49)
Lab: ABNORMAL
MAGNESIUM SERPL-MCNC: 1.8 MG/DL (ref 1.6–2.4)
MCH RBC QN AUTO: 34.8 PG (ref 26–34)
MCHC RBC AUTO-ENTMCNC: 36.2 G/DL (ref 30–36.5)
MCV RBC AUTO: 96 FL (ref 80–99)
MDMA URINE: NEGATIVE
METHADONE UR QL: NEGATIVE
MONOCYTES # BLD: 0.5 K/UL (ref 0–1)
MONOCYTES NFR BLD: 12 % (ref 5–13)
NEUTS SEG # BLD: 2.1 K/UL (ref 1.8–8)
NEUTS SEG NFR BLD: 49 % (ref 32–75)
NRBC # BLD: 0 K/UL (ref 0–0.01)
NRBC BLD-RTO: 0 PER 100 WBC
OPIATES UR QL: NEGATIVE
PCP UR QL: NEGATIVE
PLATELET # BLD AUTO: 161 K/UL (ref 150–400)
PMV BLD AUTO: 8 FL (ref 8.9–12.9)
POTASSIUM SERPL-SCNC: 3.8 MMOL/L (ref 3.5–5.1)
PROT SERPL-MCNC: 7.8 G/DL (ref 6.4–8.2)
RBC # BLD AUTO: 4.2 M/UL (ref 4.1–5.7)
SODIUM SERPL-SCNC: 143 MMOL/L (ref 136–145)
TROPONIN I SERPL HS-MCNC: 17 NG/L (ref 0–76)
WBC # BLD AUTO: 4.3 K/UL (ref 4.1–11.1)

## 2024-05-02 PROCEDURE — 6360000002 HC RX W HCPCS: Performed by: EMERGENCY MEDICINE

## 2024-05-02 PROCEDURE — 96374 THER/PROPH/DIAG INJ IV PUSH: CPT

## 2024-05-02 PROCEDURE — 6370000000 HC RX 637 (ALT 250 FOR IP): Performed by: EMERGENCY MEDICINE

## 2024-05-02 PROCEDURE — 36415 COLL VENOUS BLD VENIPUNCTURE: CPT

## 2024-05-02 PROCEDURE — 99284 EMERGENCY DEPT VISIT MOD MDM: CPT

## 2024-05-02 PROCEDURE — 80053 COMPREHEN METABOLIC PANEL: CPT

## 2024-05-02 PROCEDURE — 82077 ASSAY SPEC XCP UR&BREATH IA: CPT

## 2024-05-02 PROCEDURE — 80307 DRUG TEST PRSMV CHEM ANLYZR: CPT

## 2024-05-02 PROCEDURE — 85025 COMPLETE CBC W/AUTO DIFF WBC: CPT

## 2024-05-02 PROCEDURE — 83735 ASSAY OF MAGNESIUM: CPT

## 2024-05-02 PROCEDURE — 84484 ASSAY OF TROPONIN QUANT: CPT

## 2024-05-02 PROCEDURE — 94640 AIRWAY INHALATION TREATMENT: CPT

## 2024-05-02 RX ORDER — IPRATROPIUM BROMIDE AND ALBUTEROL SULFATE 2.5; .5 MG/3ML; MG/3ML
1 SOLUTION RESPIRATORY (INHALATION)
Status: COMPLETED | OUTPATIENT
Start: 2024-05-02 | End: 2024-05-02

## 2024-05-02 RX ORDER — HYDRALAZINE HYDROCHLORIDE 20 MG/ML
20 INJECTION INTRAMUSCULAR; INTRAVENOUS
Status: COMPLETED | OUTPATIENT
Start: 2024-05-02 | End: 2024-05-02

## 2024-05-02 RX ORDER — ASPIRIN 81 MG/1
324 TABLET, CHEWABLE ORAL
Status: COMPLETED | OUTPATIENT
Start: 2024-05-02 | End: 2024-05-02

## 2024-05-02 RX ORDER — METOPROLOL TARTRATE 50 MG/1
50 TABLET, FILM COATED ORAL
Status: COMPLETED | OUTPATIENT
Start: 2024-05-02 | End: 2024-05-02

## 2024-05-02 RX ADMIN — IPRATROPIUM BROMIDE AND ALBUTEROL SULFATE 1 DOSE: .5; 2.5 SOLUTION RESPIRATORY (INHALATION) at 21:48

## 2024-05-02 RX ADMIN — HYDRALAZINE HYDROCHLORIDE 20 MG: 20 INJECTION, SOLUTION INTRAMUSCULAR; INTRAVENOUS at 21:55

## 2024-05-02 RX ADMIN — METOPROLOL TARTRATE 50 MG: 50 TABLET ORAL at 21:55

## 2024-05-02 RX ADMIN — ASPIRIN 324 MG: 81 TABLET, CHEWABLE ORAL at 21:56

## 2024-05-02 ASSESSMENT — LIFESTYLE VARIABLES
HOW OFTEN DO YOU HAVE A DRINK CONTAINING ALCOHOL: 4 OR MORE TIMES A WEEK
HOW MANY STANDARD DRINKS CONTAINING ALCOHOL DO YOU HAVE ON A TYPICAL DAY: PATIENT UNABLE TO ANSWER

## 2024-05-02 ASSESSMENT — PAIN SCALES - GENERAL: PAINLEVEL_OUTOF10: 3

## 2024-05-03 ENCOUNTER — APPOINTMENT (OUTPATIENT)
Facility: HOSPITAL | Age: 44
End: 2024-05-03
Payer: MEDICAID

## 2024-05-03 LAB
EKG ATRIAL RATE: 99 BPM
EKG DIAGNOSIS: NORMAL
EKG P AXIS: 62 DEGREES
EKG P-R INTERVAL: 172 MS
EKG Q-T INTERVAL: 361 MS
EKG QRS DURATION: 86 MS
EKG QTC CALCULATION (BAZETT): 464 MS
EKG R AXIS: 52 DEGREES
EKG T AXIS: 66 DEGREES
EKG VENTRICULAR RATE: 99 BPM
TROPONIN I SERPL HS-MCNC: 18 NG/L (ref 0–76)

## 2024-05-03 PROCEDURE — C9113 INJ PANTOPRAZOLE SODIUM, VIA: HCPCS | Performed by: EMERGENCY MEDICINE

## 2024-05-03 PROCEDURE — 96375 TX/PRO/DX INJ NEW DRUG ADDON: CPT

## 2024-05-03 PROCEDURE — 2580000003 HC RX 258: Performed by: EMERGENCY MEDICINE

## 2024-05-03 PROCEDURE — A4216 STERILE WATER/SALINE, 10 ML: HCPCS | Performed by: EMERGENCY MEDICINE

## 2024-05-03 PROCEDURE — 6360000002 HC RX W HCPCS: Performed by: EMERGENCY MEDICINE

## 2024-05-03 RX ORDER — PANTOPRAZOLE SODIUM 40 MG/1
40 TABLET, DELAYED RELEASE ORAL
Status: DISCONTINUED | OUTPATIENT
Start: 2024-05-03 | End: 2024-05-03

## 2024-05-03 RX ADMIN — PANTOPRAZOLE SODIUM 40 MG: 40 INJECTION, POWDER, FOR SOLUTION INTRAVENOUS at 01:14

## 2024-05-03 ASSESSMENT — ENCOUNTER SYMPTOMS
ALLERGIC/IMMUNOLOGIC NEGATIVE: 1
CHEST TIGHTNESS: 0
EYES NEGATIVE: 1
RESPIRATORY NEGATIVE: 1
SHORTNESS OF BREATH: 0
GASTROINTESTINAL NEGATIVE: 1

## 2024-05-03 NOTE — ED PROVIDER NOTES
hospital admission for adequate evaluation for alcohol intoxication and hypertensive urgency , and that by refusing the above, he is at risk for myocardial infarction, arrhythmia, sudden death, further deterioration and coma..  He is awake, alert, and he understands his condition and the risks involved in leaving against medical advise.      He is clinically aware of his surroundings and able to ask appropriate questions, the patients wife is by his side. She plan to take him home and watch him. and the nurse present confirms he is not clinically intoxicated and able to make medical decisions. He verbalized knowing the risks and understood it was recommended that he stay and could also return at any time.  his was present for the discussion and also verbalized that they understood both diagnosis, risks and could return at any time.  They were both provided with warnings regarding worsening of his condition and were provided instructions to follow up with No primary care provider on file. tomorrow or return to the Emergency Room as soon as possible. This discussion was witnessed by nurse Hess..       CRITICAL CARE TIME   Total Critical Care time was  minutes, excluding separately reportable procedures.  There was a high probability of clinically significant/life threatening deterioration in the patient's condition which required my urgent intervention.      CONSULTS:  None    PROCEDURES:  Unless otherwise noted below, none     Procedures        FINAL IMPRESSION    No diagnosis found.      DISPOSITION/PLAN   DISPOSITION        PATIENT REFERRED TO:  No follow-up provider specified.    DISCHARGE MEDICATIONS:  New Prescriptions    No medications on file     Controlled Substances Monitoring:          No data to display                (Please note that portions of this note were completed with a voice recognition program.  Efforts were made to edit the dictations but occasionally words are mis-transcribed.)    Yvette

## 2024-05-03 NOTE — ED NOTES
Patient declines wanting admission or a CT scan.  Dr. Crawford General aware and to bedside to discuss with patient.  Patient opting to leave AMA.

## 2024-05-03 NOTE — DISCHARGE INSTRUCTIONS
She was given information on alcohol treatment facilities.  He is encouraged to go to AA discussed this with his wife as well.  Patient is to follow-up with his primary PCP in a.m.  Avoid alcohol intake.

## 2024-05-03 NOTE — ED TRIAGE NOTES
Pt states that he is here because of a headache that started earlier today. Pt denies taking any meds PTA. Pt also states that he is also having some chest pains that started around the same time as well. Pt rates pain 3/10 ATT.  Pt denies any medical issues and states that he doesn't take any medications either.     Pt admits to drinking all day. Pt's father states that pt drinks alcohol everyday. Pt states that he is not trying to hurt himself or anyone else. Pt states that he just likes to drink.

## 2024-05-05 ENCOUNTER — HOSPITAL ENCOUNTER (EMERGENCY)
Facility: HOSPITAL | Age: 44
Discharge: HOME | End: 2024-05-05
Attending: EMERGENCY MEDICINE
Payer: MEDICAID

## 2024-05-05 VITALS
TEMPERATURE: 98.1 F | RESPIRATION RATE: 22 BRPM | HEART RATE: 86 BPM | OXYGEN SATURATION: 97 % | SYSTOLIC BLOOD PRESSURE: 137 MMHG | DIASTOLIC BLOOD PRESSURE: 89 MMHG

## 2024-05-05 DIAGNOSIS — G40.909 SEIZURE DISORDER (HCC): Primary | ICD-10-CM

## 2024-05-05 LAB
ALBUMIN SERPL-MCNC: 3.9 G/DL (ref 3.5–5)
ALBUMIN/GLOB SERPL: 1 (ref 1.1–2.2)
ALP SERPL-CCNC: 133 U/L (ref 45–117)
ALT SERPL-CCNC: 51 U/L (ref 12–78)
ANION GAP SERPL CALC-SCNC: 21 MMOL/L (ref 5–15)
AST SERPL W P-5'-P-CCNC: 82 U/L (ref 15–37)
BASOPHILS # BLD: 0.1 K/UL (ref 0–0.1)
BASOPHILS NFR BLD: 1 % (ref 0–1)
BILIRUB SERPL-MCNC: 0.8 MG/DL (ref 0.2–1)
BUN SERPL-MCNC: 5 MG/DL (ref 6–20)
BUN/CREAT SERPL: 3 (ref 12–20)
CA-I BLD-MCNC: 8.6 MG/DL (ref 8.5–10.1)
CHLORIDE SERPL-SCNC: 95 MMOL/L (ref 97–108)
CO2 SERPL-SCNC: 19 MMOL/L (ref 21–32)
CREAT SERPL-MCNC: 1.43 MG/DL (ref 0.7–1.3)
DIFFERENTIAL METHOD BLD: ABNORMAL
EOSINOPHIL # BLD: 0.1 K/UL (ref 0–0.4)
EOSINOPHIL NFR BLD: 1 % (ref 0–7)
ERYTHROCYTE [DISTWIDTH] IN BLOOD BY AUTOMATED COUNT: 15.8 % (ref 11.5–14.5)
ETHANOL SERPL-MCNC: <10 MG/DL (ref 0–0.08)
GLOBULIN SER CALC-MCNC: 3.9 G/DL (ref 2–4)
GLUCOSE SERPL-MCNC: 153 MG/DL (ref 65–100)
HCT VFR BLD AUTO: 39.5 % (ref 36.6–50.3)
HGB BLD-MCNC: 14 G/DL (ref 12.1–17)
IMM GRANULOCYTES # BLD AUTO: 0 K/UL (ref 0–0.04)
IMM GRANULOCYTES NFR BLD AUTO: 0 % (ref 0–0.5)
LYMPHOCYTES # BLD: 2.7 K/UL (ref 0.8–3.5)
LYMPHOCYTES NFR BLD: 28 % (ref 12–49)
MAGNESIUM SERPL-MCNC: 1.5 MG/DL (ref 1.6–2.4)
MCH RBC QN AUTO: 35 PG (ref 26–34)
MCHC RBC AUTO-ENTMCNC: 35.4 G/DL (ref 30–36.5)
MCV RBC AUTO: 98.8 FL (ref 80–99)
MONOCYTES # BLD: 1.1 K/UL (ref 0–1)
MONOCYTES NFR BLD: 11 % (ref 5–13)
NEUTS SEG # BLD: 5.7 K/UL (ref 1.8–8)
NEUTS SEG NFR BLD: 59 % (ref 32–75)
NRBC # BLD: 0.02 K/UL (ref 0–0.01)
NRBC BLD-RTO: 0.2 PER 100 WBC
PLATELET # BLD AUTO: 168 K/UL (ref 150–400)
PMV BLD AUTO: 9.6 FL (ref 8.9–12.9)
POTASSIUM SERPL-SCNC: 3.4 MMOL/L (ref 3.5–5.1)
PROT SERPL-MCNC: 7.8 G/DL (ref 6.4–8.2)
RBC # BLD AUTO: 4 M/UL (ref 4.1–5.7)
SODIUM SERPL-SCNC: 135 MMOL/L (ref 136–145)
WBC # BLD AUTO: 9.7 K/UL (ref 4.1–11.1)

## 2024-05-05 PROCEDURE — 6360000002 HC RX W HCPCS: Performed by: EMERGENCY MEDICINE

## 2024-05-05 PROCEDURE — 85025 COMPLETE CBC W/AUTO DIFF WBC: CPT

## 2024-05-05 PROCEDURE — 82077 ASSAY SPEC XCP UR&BREATH IA: CPT

## 2024-05-05 PROCEDURE — 96365 THER/PROPH/DIAG IV INF INIT: CPT

## 2024-05-05 PROCEDURE — 99284 EMERGENCY DEPT VISIT MOD MDM: CPT

## 2024-05-05 PROCEDURE — 83735 ASSAY OF MAGNESIUM: CPT

## 2024-05-05 PROCEDURE — 36415 COLL VENOUS BLD VENIPUNCTURE: CPT

## 2024-05-05 PROCEDURE — 80053 COMPREHEN METABOLIC PANEL: CPT

## 2024-05-05 PROCEDURE — 96375 TX/PRO/DX INJ NEW DRUG ADDON: CPT

## 2024-05-05 RX ORDER — LEVETIRACETAM 500 MG/1
500 TABLET ORAL 2 TIMES DAILY
Qty: 60 TABLET | Refills: 1 | Status: SHIPPED | OUTPATIENT
Start: 2024-05-05 | End: 2024-05-05

## 2024-05-05 RX ORDER — MAGNESIUM SULFATE IN WATER 40 MG/ML
2000 INJECTION, SOLUTION INTRAVENOUS
Status: COMPLETED | OUTPATIENT
Start: 2024-05-05 | End: 2024-05-05

## 2024-05-05 RX ORDER — LEVETIRACETAM 500 MG/1
500 TABLET ORAL 2 TIMES DAILY
Qty: 60 TABLET | Refills: 1 | Status: SHIPPED | OUTPATIENT
Start: 2024-05-05

## 2024-05-05 RX ORDER — LEVETIRACETAM 500 MG/5ML
1500 INJECTION, SOLUTION, CONCENTRATE INTRAVENOUS
Status: COMPLETED | OUTPATIENT
Start: 2024-05-05 | End: 2024-05-05

## 2024-05-05 RX ADMIN — LEVETIRACETAM 1500 MG: 100 INJECTION, SOLUTION INTRAVENOUS at 13:54

## 2024-05-05 RX ADMIN — MAGNESIUM SULFATE HEPTAHYDRATE 2000 MG: 40 INJECTION, SOLUTION INTRAVENOUS at 14:23

## 2024-05-05 ASSESSMENT — PAIN - FUNCTIONAL ASSESSMENT: PAIN_FUNCTIONAL_ASSESSMENT: 0-10

## 2024-05-05 ASSESSMENT — PAIN SCALES - GENERAL: PAINLEVEL_OUTOF10: 0

## 2024-05-05 NOTE — ED TRIAGE NOTES
Patient arrives to ED via [facility] ref seizure. Patient had witnessed seizure at work, unknown length of same. Patient presents with small laceration near the left eye. CAOx4, denies pain. Reports he has had seizures before but never taken medication for same. Pt denies drugs or alcohol use.  by EMS.

## 2024-05-05 NOTE — ED NOTES
[facility] EMERGENCY DEPT  [address] [place] [id number]  Phone: [phone]             May 5, 2024    Patient: Saad Brush Jr.   YOB: 1980   Date of Visit: 5/5/2024       To Whom It May Concern:    Saad Brush was seen and treated in our emergency department on 5/5/2024. He may return to Work/School on the following date ___5/7/2024_________      Sincerely,       Dedra Lira RN         Signature:__________________________________

## 2024-05-05 NOTE — ED PROVIDER NOTES
- 17.0 g/dL    Hematocrit 39.5 36.6 - 50.3 %    MCV 98.8 80.0 - 99.0 FL    MCH 35.0 (H) 26.0 - 34.0 PG    MCHC 35.4 30.0 - 36.5 g/dL    RDW 15.8 (H) 11.5 - 14.5 %    Platelets 168 150 - 400 K/uL    MPV 9.6 8.9 - 12.9 FL    Nucleated RBCs 0.2 (H) 0.0  WBC    nRBC 0.02 (H) 0.00 - 0.01 K/uL    Neutrophils % 59 32 - 75 %    Lymphocytes % 28 12 - 49 %    Monocytes % 11 5 - 13 %    Eosinophils % 1 0 - 7 %    Basophils % 1 0 - 1 %    Immature Granulocytes % 0 0 - 0.5 %    Neutrophils Absolute 5.7 1.8 - 8.0 K/UL    Lymphocytes Absolute 2.7 0.8 - 3.5 K/UL    Monocytes Absolute 1.1 (H) 0.0 - 1.0 K/UL    Eosinophils Absolute 0.1 0.0 - 0.4 K/UL    Basophils Absolute 0.1 0.0 - 0.1 K/UL    Immature Granulocytes Absolute 0.0 0.00 - 0.04 K/UL    Differential Type AUTOMATED         EKG: Not Applicable    Radiologic Studies:  Non-plain film images such as CT, Ultrasound and MRI are read by the radiologist. Plain radiographic images are visualized and preliminarily interpreted by the ED Physician with the following findings: Not Applicable.    Interpretation per the Radiologist below, if available at the time of this note:  No orders to display        ED COURSE and DIFFERENTIAL DIAGNOSIS/MDM   CC/HPI Summary, DDx, ED Course, and Reassessment: 44-year-old male presents with complaint of observed seizure episode while at work, patient denies any urinary or fecal incontinence, patient states he did not feel anything before it happened, patient admits to 2-year history of being diagnosed with seizure disorder but is not on any current medications, patient states last seizure episode was 6 months ago, patient did not seek follow-up with his PCP after discharge from the ED    Records Reviewed (source and summary of external notes): Prior medical records and Nursing notes    Vitals:    Vitals:    05/05/24 1328   BP: (!) 146/112   Pulse: (!) 109   Resp: 18   Temp: 98.1 °F (36.7 °C)   SpO2: 98%        ED COURSE  Labs  Keppra IV